# Patient Record
Sex: FEMALE | Race: WHITE | NOT HISPANIC OR LATINO | Employment: OTHER | URBAN - NONMETROPOLITAN AREA
[De-identification: names, ages, dates, MRNs, and addresses within clinical notes are randomized per-mention and may not be internally consistent; named-entity substitution may affect disease eponyms.]

---

## 2019-01-03 DIAGNOSIS — M25.561 RIGHT KNEE PAIN, UNSPECIFIED CHRONICITY: Primary | ICD-10-CM

## 2019-01-22 ENCOUNTER — OFFICE VISIT (OUTPATIENT)
Dept: ORTHOPEDIC SURGERY | Facility: CLINIC | Age: 64
End: 2019-01-22

## 2019-01-22 VITALS — BODY MASS INDEX: 28.56 KG/M2 | WEIGHT: 182 LBS | HEIGHT: 67 IN

## 2019-01-22 DIAGNOSIS — M25.561 ACUTE PAIN OF RIGHT KNEE: Primary | ICD-10-CM

## 2019-01-22 PROCEDURE — 99204 OFFICE O/P NEW MOD 45 MIN: CPT | Performed by: NURSE PRACTITIONER

## 2019-01-22 PROCEDURE — 20610 DRAIN/INJ JOINT/BURSA W/O US: CPT | Performed by: NURSE PRACTITIONER

## 2019-01-22 RX ORDER — PRAVASTATIN SODIUM 20 MG
TABLET ORAL
COMMUNITY
Start: 2019-01-07 | End: 2019-11-27 | Stop reason: SDUPTHER

## 2019-01-22 RX ORDER — LOSARTAN POTASSIUM 50 MG/1
100 TABLET ORAL
COMMUNITY
Start: 2019-01-07 | End: 2019-11-22 | Stop reason: ALTCHOICE

## 2019-01-22 RX ADMIN — TRIAMCINOLONE ACETONIDE 40 MG: 40 INJECTION, SUSPENSION INTRA-ARTICULAR; INTRAMUSCULAR at 08:36

## 2019-01-22 RX ADMIN — LIDOCAINE HYDROCHLORIDE 2 ML: 10 INJECTION, SOLUTION EPIDURAL; INFILTRATION; INTRACAUDAL; PERINEURAL at 08:36

## 2019-01-22 NOTE — PROGRESS NOTES
Ann Marie Malik is a 63 y.o. female   Primary provider:  Manuel Roberts APRN       Chief Complaint   Patient presents with   • Right Knee - Pain   • Establish Care       HISTORY OF PRESENT ILLNESS:     63-year-old female patient presents to office for evaluation of acute right knee pain.  Patient denies any known injury but states that the knee pain started suddenly after carrying groceries up some stairs.  She denies any fall.  Patient reports she had severe right knee pain with difficulty ambulating for about 2 days and then the pain began to improve somewhat.  Patient denies any history of chronic knee pain or prior injuries to the knee.  Patient reports her pain is intermittent now, but remains moderate in severity.  Pain is described as grinding and aching in nature with associated popping sensations and swelling.  Pain is worse with weightbearing, standing and walking.  Pain improves mildly with rest, ice therapy and anti-inflammatory medications.      Pain   This is a new problem. The current episode started 1 to 4 weeks ago (3 weeks ago). The problem occurs intermittently. The problem has been gradually improving. Associated symptoms include arthralgias and joint swelling. Associated symptoms comments: Grinding, aching pain; popping sensations with motion, swelling. The symptoms are aggravated by standing and walking (weightbearing activity). She has tried NSAIDs, ice and rest for the symptoms. The treatment provided mild relief.        CONCURRENT MEDICAL HISTORY:    History reviewed. No pertinent past medical history.    No Known Allergies      Current Outpatient Medications:   •  losartan (COZAAR) 50 MG tablet, , Disp: , Rfl:   •  pravastatin (PRAVACHOL) 20 MG tablet, , Disp: , Rfl:     Past Surgical History:   Procedure Laterality Date   • TUBAL ABDOMINAL LIGATION  1980       Family History   Problem Relation Age of Onset   • Heart disease Other    • Hypertension Other    • Cancer Other   "      Social History     Socioeconomic History   • Marital status:      Spouse name: Not on file   • Number of children: Not on file   • Years of education: Not on file   • Highest education level: Not on file   Social Needs   • Financial resource strain: Not on file   • Food insecurity - worry: Not on file   • Food insecurity - inability: Not on file   • Transportation needs - medical: Not on file   • Transportation needs - non-medical: Not on file   Occupational History   • Not on file   Tobacco Use   • Smoking status: Never Smoker   • Smokeless tobacco: Never Used   Substance and Sexual Activity   • Alcohol use: No     Frequency: Never   • Drug use: Not on file   • Sexual activity: Not on file   Other Topics Concern   • Not on file   Social History Narrative   • Not on file        Review of Systems   Constitutional: Positive for activity change.   HENT: Negative.    Eyes: Negative.         Dry eyes   Respiratory: Negative.    Cardiovascular: Negative.    Gastrointestinal: Negative.    Endocrine: Negative.    Genitourinary: Negative.    Musculoskeletal: Positive for arthralgias, gait problem and joint swelling.        Right knee pain.    Skin: Negative.    Allergic/Immunologic: Negative.    Hematological: Negative.    Psychiatric/Behavioral: Negative.        PHYSICAL EXAMINATION:       Ht 170.2 cm (67\")   Wt 82.6 kg (182 lb)   BMI 28.51 kg/m²     Physical Exam   Constitutional: She is oriented to person, place, and time. Vital signs are normal. She appears well-developed and well-nourished. She is active and cooperative. She does not appear ill. No distress.   HENT:   Head: Normocephalic.   Pulmonary/Chest: Effort normal. No respiratory distress.   Abdominal: Soft. She exhibits no distension.   Musculoskeletal: She exhibits edema (Mild, right knee) and tenderness (Mild, right knee). She exhibits no deformity.        Right knee: She exhibits no effusion.        Left knee: She exhibits no effusion. "   Neurological: She is alert and oriented to person, place, and time. GCS eye subscore is 4. GCS verbal subscore is 5. GCS motor subscore is 6.   Skin: Skin is warm, dry and intact. Capillary refill takes less than 2 seconds. No erythema.   Psychiatric: She has a normal mood and affect. Her speech is normal and behavior is normal. Judgment and thought content normal. Cognition and memory are normal.   Vitals reviewed.      GAIT:     []  Normal  [x]  Antalgic    Assistive device: [x]  None  []  Walker     []  Crutches  []  Cane     []  Wheelchair  []  Stretcher    Right Knee Exam     Tenderness   Right knee tenderness location: Mild, diffuse.    Range of Motion   Extension: 0   Flexion: 120     Tests   Nora:  Medial - negative Lateral - negative  Varus: negative Valgus: negative    Other   Erythema: absent  Sensation: normal  Pulse: present  Swelling: mild  Effusion: no effusion present    Comments:  Mild/minimal pain and limitations with range of motion. Mild, generalized swelling noted. No definitive effusion. No erythema. No warmth. Stable joint exam.       Left Knee Exam     Tenderness   The patient is experiencing no tenderness.     Range of Motion   Extension: 0   Flexion: 130     Tests   Nora:  Medial - negative Lateral - negative  Varus: negative Valgus: negative    Other   Erythema: absent  Sensation: normal  Pulse: present  Swelling: none  Effusion: no effusion present            Xr Knee 1 Or 2 View Right    Result Date: 1/22/2019  Narrative: Lateral x-ray of the right knee reveals no evidence of acute fracture or dislocation.  No significant degenerative changes are noted.  Patellofemoral joint spacing is well-maintained.  Anatomic alignment is normal.  Small joint effusion is noted.  No acute bony radiologic abnormalities are noted at this time.  No comparison images are available for review.01/22/19 at 9:39 AM by LUIS Barrera     Xr Knee Bilateral Ap Standing    Result Date:  1/22/2019  Narrative: AP standing x-ray of bilateral knees reveals no evidence of acute fracture or dislocation.  No significant degenerative changes are noted.  Medial and lateral compartmental joint spacing is well-maintained bilaterally.  Anatomic alignment of the knee joints is acceptable.  Soft tissues appear unremarkable.  No acute bony radiologic abnormalities are noted at this time.  No comparison images are available for review.01/22/19 at 9:38 AM by LUIS Barrera         ASSESSMENT:    Diagnoses and all orders for this visit:    Acute pain of right knee  -     Large Joint Arthrocentesis: R knee    Other orders  -     losartan (COZAAR) 50 MG tablet;   -     pravastatin (PRAVACHOL) 20 MG tablet;       Large Joint Arthrocentesis: R knee  Date/Time: 1/22/2019 8:36 AM  Consent given by: patient  Site marked: site marked  Timeout: Immediately prior to procedure a time out was called to verify the correct patient, procedure, equipment, support staff and site/side marked as required   Supporting Documentation  Indications: pain and joint swelling   Procedure Details  Location: knee - R knee  Preparation: Patient was prepped and draped in the usual sterile fashion  Needle size: 22 G  Approach: anterolateral  Medications administered: 2 mL lidocaine PF 1% 1 %; 40 mg triamcinolone acetonide 40 MG/ML  Patient tolerance: patient tolerated the procedure well with no immediate complications      PLAN    X-rays of right knee reviewed with no acute findings noted.  No significant degenerative findings are noted.  Patient had acute onset of right knee pain after carrying some groceries up a flight of stairs.  The pain has been gradually improving and is described as more intermittent now.  We discussed possible internal derangement, such as meniscal injury or ligament injury.  She has no definitive laxity and no locking/catching symptoms of concern currently.  We discussed possible MRI of her right knee.  Patient  wants to wait on MRI now and see if she improves with conservative treatment efforts.  Recommend intra-articular injection of steroid to the right knee today for management of pain/swelling/inflammation.  Recommend consistent dosing of oral NSAIDs, such as Ibuprofen or Aleve, for the next 2-4 weeks.  Recommend elevation of the right knee to minimize swelling.  Recommend ice therapy to the right knee intermittently 3-4 times daily for 20 minutes at a time to minimize pain/swelling.  Recommend rest and activity modification as tolerated and based on pain.  We also discussed trial of use of a cane for modified weight-bearing until her pain improves.  Patient can progress her activity and weightbearing as tolerated.  Follow-up in 2-4 weeks for recheck.  Plan for MRI of right knee pain/symptoms persist.     Return in about 2 weeks (around 2/5/2019) for Recheck.        This document has been electronically signed by LUIS Barrera on January 23, 2019 2:44 PM      LUIS Barrera

## 2019-01-23 PROBLEM — M25.561 ACUTE PAIN OF RIGHT KNEE: Status: ACTIVE | Noted: 2019-01-23

## 2019-01-23 RX ORDER — TRIAMCINOLONE ACETONIDE 40 MG/ML
40 INJECTION, SUSPENSION INTRA-ARTICULAR; INTRAMUSCULAR
Status: COMPLETED | OUTPATIENT
Start: 2019-01-22 | End: 2019-01-22

## 2019-01-23 RX ORDER — LIDOCAINE HYDROCHLORIDE 10 MG/ML
2 INJECTION, SOLUTION EPIDURAL; INFILTRATION; INTRACAUDAL; PERINEURAL
Status: COMPLETED | OUTPATIENT
Start: 2019-01-22 | End: 2019-01-22

## 2019-06-25 ENCOUNTER — OFFICE VISIT (OUTPATIENT)
Dept: ORTHOPEDIC SURGERY | Facility: CLINIC | Age: 64
End: 2019-06-25

## 2019-06-25 VITALS — BODY MASS INDEX: 28.88 KG/M2 | HEIGHT: 67 IN | WEIGHT: 184 LBS

## 2019-06-25 DIAGNOSIS — G89.29 CHRONIC PAIN OF RIGHT KNEE: Primary | ICD-10-CM

## 2019-06-25 DIAGNOSIS — M25.561 CHRONIC PAIN OF RIGHT KNEE: Primary | ICD-10-CM

## 2019-06-25 PROCEDURE — 20610 DRAIN/INJ JOINT/BURSA W/O US: CPT | Performed by: NURSE PRACTITIONER

## 2019-06-25 PROCEDURE — 99214 OFFICE O/P EST MOD 30 MIN: CPT | Performed by: NURSE PRACTITIONER

## 2019-06-25 RX ORDER — HYDROCHLOROTHIAZIDE 25 MG/1
TABLET ORAL
Refills: 5 | COMMUNITY
Start: 2019-06-04 | End: 2019-11-22 | Stop reason: SDUPTHER

## 2019-06-25 RX ADMIN — LIDOCAINE HYDROCHLORIDE 2 ML: 10 INJECTION, SOLUTION EPIDURAL; INFILTRATION; INTRACAUDAL; PERINEURAL at 09:39

## 2019-06-25 RX ADMIN — TRIAMCINOLONE ACETONIDE 40 MG: 40 INJECTION, SUSPENSION INTRA-ARTICULAR; INTRAMUSCULAR at 09:39

## 2019-06-25 NOTE — PROGRESS NOTES
"Ann Marie Malik is a 63 y.o. female returns for     Chief Complaint   Patient presents with   • Right Knee - Follow-up, Pain       HISTORY OF PRESENT ILLNESS: Patient presents to office for follow-up of right knee pain.  Patient had a sudden onset of acute right knee pain and January 2019 while carrying some groceries up some stairs.  Patient was given an injection of steroid into the right knee on 1/22/2019, which significantly improved her pain for several months.  Patient reports her pain has been returning recently.  She denies any new injury or incident associated with the recurrence of her right knee pain.     CONCURRENT MEDICAL HISTORY:    The following portions of the patient's history were reviewed and updated as appropriate: allergies, current medications, past family history, past medical history, past social history, past surgical history and problem list.     ROS  No fevers or chills.  No chest pain or shortness of air.  No GI or  disturbances. Right knee pain.     PHYSICAL EXAMINATION:       Ht 170.2 cm (67\")   Wt 83.5 kg (184 lb)   BMI 28.82 kg/m²     Physical Exam   Constitutional: She is oriented to person, place, and time. Vital signs are normal. She appears well-developed and well-nourished. She is active and cooperative. She does not appear ill. No distress.   HENT:   Head: Normocephalic.   Pulmonary/Chest: Effort normal. No respiratory distress.   Abdominal: Soft. She exhibits no distension.   Musculoskeletal: She exhibits edema (Mild, right knee) and tenderness (Mild, right knee). She exhibits no deformity.        Right knee: She exhibits no effusion.        Left knee: She exhibits no effusion.   Neurological: She is alert and oriented to person, place, and time. GCS eye subscore is 4. GCS verbal subscore is 5. GCS motor subscore is 6.   Skin: Skin is warm, dry and intact. Capillary refill takes less than 2 seconds. No erythema.   Psychiatric: She has a normal mood and affect. Her speech " is normal and behavior is normal. Judgment and thought content normal. Cognition and memory are normal.   Vitals reviewed.      GAIT:     []  Normal  [x]  Antalgic    Assistive device: [x]  None  []  Walker     []  Crutches  []  Cane     []  Wheelchair  []  Stretcher    Right Knee Exam     Tenderness   Right knee tenderness location: Mild, diffuse.    Range of Motion   Extension: 0   Flexion: 120     Tests   Nora:  Medial - negative Lateral - negative  Varus: negative Valgus: negative    Other   Erythema: absent  Sensation: normal  Pulse: present  Swelling: mild  Effusion: no effusion present    Comments:  Mild/minimal pain and limitations with range of motion. Mild, generalized swelling noted. No definitive effusion. No erythema. No warmth. Stable joint exam.       Left Knee Exam     Tenderness   The patient is experiencing no tenderness.     Range of Motion   Extension: 0   Flexion: 130     Tests   Nora:  Medial - negative Lateral - negative  Varus: negative Valgus: negative    Other   Erythema: absent  Sensation: normal  Pulse: present  Swelling: none  Effusion: no effusion present              Xr Knee 1 Or 2 View Right     Result Date: 1/22/2019  Narrative: Lateral x-ray of the right knee reveals no evidence of acute fracture or dislocation.  No significant degenerative changes are noted.  Patellofemoral joint spacing is well-maintained.  Anatomic alignment is normal.  Small joint effusion is noted.  No acute bony radiologic abnormalities are noted at this time.  No comparison images are available for review.01/22/19 at 9:39 AM by LUIS Barrera      Xr Knee Bilateral Ap Standing     Result Date: 1/22/2019  Narrative: AP standing x-ray of bilateral knees reveals no evidence of acute fracture or dislocation.  No significant degenerative changes are noted.  Medial and lateral compartmental joint spacing is well-maintained bilaterally.  Anatomic alignment of the knee joints is acceptable.  Soft tissues  appear unremarkable.  No acute bony radiologic abnormalities are noted at this time.  No comparison images are available for review.01/22/19 at 9:38 AM by LUIS Barrera        Large Joint Arthrocentesis: R knee  Date/Time: 6/25/2019 9:39 AM  Consent given by: patient  Site marked: site marked  Timeout: Immediately prior to procedure a time out was called to verify the correct patient, procedure, equipment, support staff and site/side marked as required   Supporting Documentation  Indications: pain and joint swelling   Procedure Details  Location: knee - R knee  Preparation: Patient was prepped and draped in the usual sterile fashion  Needle size: 22 G  Approach: anterolateral  Medications administered: 2 mL lidocaine PF 1% 1 %; 40 mg triamcinolone acetonide 40 MG/ML  Patient tolerance: patient tolerated the procedure well with no immediate complications            ASSESSMENT:    Diagnoses and all orders for this visit:    Chronic pain of right knee  -     Large Joint Arthrocentesis: R knee    PLAN    Patient complains of increasing right knee pain with no new injury or incident reported.  Patient had a similar episode in January 2019 when she had an acute onset of right knee pain while carrying some groceries up some stairs.  Patient was given an intra-articular injection of steroid at that time, which significantly improved her pain for several months.  On x-ray, she does not have any significant degenerative changes noted that are consistent with moderate or severe osteoarthritis.  We discussed other differential diagnoses, including meniscal tear, ligament injury and/or chondromalacia.  She is not having any mechanical symptoms in the knee.  She is not having instability symptoms.  We discussed ordering an MRI of the right knee for further evaluation.  Patient wants to wait on the MRI for now.  Recommend a repeat intra-articular injection of steroid to the right knee today for management of  pain/swelling/inflammation. Recommend consistent dosing of oral NSAIDs, such as Ibuprofen or Aleve, for the next 2-4 weeks.  Recommend elevation of the right knee to minimize swelling.  Recommend ice therapy to the right knee intermittently 3-4 times daily for 20 minutes at a time to minimize pain/swelling.  Recommend rest and activity modification as tolerated and based on pain.  We also discussed trial of use of a cane for modified weight-bearing until her pain improves.  Patient can progress her activity and weightbearing as tolerated.  Follow-up in 4 weeks for recheck.  Plan for MRI of right knee pain/symptoms persist.     Return in about 4 weeks (around 7/23/2019) for Recheck.        This document has been electronically signed by LUIS Barrera on June 27, 2019 8:18 AM      LUIS Barrera

## 2019-06-27 RX ORDER — TRIAMCINOLONE ACETONIDE 40 MG/ML
40 INJECTION, SUSPENSION INTRA-ARTICULAR; INTRAMUSCULAR
Status: COMPLETED | OUTPATIENT
Start: 2019-06-25 | End: 2019-06-25

## 2019-06-27 RX ORDER — LIDOCAINE HYDROCHLORIDE 10 MG/ML
2 INJECTION, SOLUTION EPIDURAL; INFILTRATION; INTRACAUDAL; PERINEURAL
Status: COMPLETED | OUTPATIENT
Start: 2019-06-25 | End: 2019-06-25

## 2019-11-22 ENCOUNTER — OFFICE VISIT (OUTPATIENT)
Dept: FAMILY MEDICINE CLINIC | Facility: CLINIC | Age: 64
End: 2019-11-22

## 2019-11-22 VITALS
SYSTOLIC BLOOD PRESSURE: 160 MMHG | HEIGHT: 67 IN | DIASTOLIC BLOOD PRESSURE: 120 MMHG | BODY MASS INDEX: 28.16 KG/M2 | WEIGHT: 179.4 LBS | HEART RATE: 77 BPM | OXYGEN SATURATION: 96 %

## 2019-11-22 DIAGNOSIS — Z12.11 COLON CANCER SCREENING: ICD-10-CM

## 2019-11-22 DIAGNOSIS — I16.0 ASYMPTOMATIC HYPERTENSIVE URGENCY: Primary | ICD-10-CM

## 2019-11-22 DIAGNOSIS — Z11.59 NEED FOR HEPATITIS C SCREENING TEST: ICD-10-CM

## 2019-11-22 DIAGNOSIS — E78.5 HYPERLIPIDEMIA, UNSPECIFIED HYPERLIPIDEMIA TYPE: ICD-10-CM

## 2019-11-22 DIAGNOSIS — Z76.89 ENCOUNTER TO ESTABLISH CARE: ICD-10-CM

## 2019-11-22 DIAGNOSIS — K21.9 GASTROESOPHAGEAL REFLUX DISEASE, ESOPHAGITIS PRESENCE NOT SPECIFIED: ICD-10-CM

## 2019-11-22 PROCEDURE — 99203 OFFICE O/P NEW LOW 30 MIN: CPT | Performed by: FAMILY MEDICINE

## 2019-11-22 RX ORDER — OMEPRAZOLE 20 MG/1
CAPSULE, DELAYED RELEASE ORAL
Refills: 5 | COMMUNITY
Start: 2019-10-23 | End: 2019-11-22 | Stop reason: SDUPTHER

## 2019-11-22 RX ORDER — ACETAMINOPHEN 500 MG
500 TABLET ORAL EVERY 6 HOURS PRN
COMMUNITY
End: 2021-08-31

## 2019-11-22 RX ORDER — LISINOPRIL 20 MG/1
20 TABLET ORAL DAILY
Qty: 90 TABLET | Refills: 0 | Status: SHIPPED | OUTPATIENT
Start: 2019-11-22 | End: 2020-01-22

## 2019-11-22 RX ORDER — OMEPRAZOLE 20 MG/1
20 CAPSULE, DELAYED RELEASE ORAL DAILY
Qty: 90 CAPSULE | Refills: 0 | Status: SHIPPED | OUTPATIENT
Start: 2019-11-22 | End: 2020-01-22 | Stop reason: SDUPTHER

## 2019-11-22 RX ORDER — HYDROCHLOROTHIAZIDE 25 MG/1
25 TABLET ORAL DAILY
Qty: 90 TABLET | Refills: 0 | Status: SHIPPED | OUTPATIENT
Start: 2019-11-22 | End: 2020-01-22 | Stop reason: SDUPTHER

## 2019-11-22 NOTE — PROGRESS NOTES
Subjective   Chief Complaint   Patient presents with   • Establish Care   • Med Refill     Ann Marie Malik is a 64 y.o. year old presenting to establish care as new patient.      Additional Concerns:    1.  Hypertension - Patient presents with concerns for hypertension.  States that blood pressure has been running pretty high.  She has been checking at home.  Systolic blood pressure has been between 130 and 160.  Diastolic blood pressure has been between 80 and 120.  Patient intermittently experiences headaches with high blood pressures.  She is asymptomatic today.  No headache, no blurry vision, no chest pain no shortness of breath. Current antihypertensive medications include hydrochlorothiazide 25 mg and losartan 100 mg.  Patient has been taking these as prescribed.  She has taken her 's lisinopril in the past when her BP was very high, and this seemed to work well for her.    2.  Hyperlipidemia - Controlled.  History of high cholesterol.  Currently taking pravastatin 20 mg daily.    3.  GERD - Controlled with omeprazole 20 mg daily.    Past Medical History:   Diagnosis Date   • GERD (gastroesophageal reflux disease)    • Hyperlipidemia    • Hypertension        Past Surgical History:   Procedure Laterality Date   • TUBAL ABDOMINAL LIGATION  1980       Medications:  Current Outpatient Medications on File Prior to Visit   Medication Sig Dispense Refill   • hydrochlorothiazide (HYDRODIURIL) 25 MG tablet TAKE 1 TABLET BY MOUTH ONCE DAILY WITH LOSARTAN  5   • losartan (COZAAR) 50 MG tablet 100 mg.     • pravastatin (PRAVACHOL) 20 MG tablet        No current facility-administered medications on file prior to visit.        No Known Allergies    Family History   Problem Relation Age of Onset   • Heart disease Other    • Hypertension Other    • Cancer Other        Social History     Socioeconomic History   • Marital status:      Spouse name: Not on file   • Number of children: Not on file   • Years of  "education: Not on file   • Highest education level: Not on file   Tobacco Use   • Smoking status: Never Smoker   • Smokeless tobacco: Never Used   Substance and Sexual Activity   • Alcohol use: No     Frequency: Never       Health Maintenance   Topic Date Due   • TDAP/TD VACCINES (1 - Tdap) 11/03/1973   • ZOSTER VACCINE (1 of 2) 11/03/2004   • COLONOSCOPY  01/03/2019   • LIPID PANEL  11/22/2019   • PNEUMOCOCCAL VACCINES (65+ LOW/MEDIUM RISK) (2 of 2 - PCV13) 11/05/2020   • MAMMOGRAM  08/06/2021   • INFLUENZA VACCINE  Completed     Last DEXA: 3/21/16, normal      Problem list was reviewed and updated as appropriate.      Review of Systems   Constitutional: Negative for appetite change and unexpected weight change.   HENT: Negative for voice change.    Eyes: Negative for visual disturbance.   Respiratory: Negative for chest tightness and shortness of breath.    Cardiovascular: Negative for chest pain and leg swelling.   Gastrointestinal: Negative for abdominal pain, constipation and diarrhea.   Endocrine: Negative for cold intolerance and heat intolerance.   Genitourinary: Negative for difficulty urinating.   Musculoskeletal: Negative for back pain and myalgias.   Skin: Negative for rash.   Neurological: Negative for numbness and headaches.   Psychiatric/Behavioral: Negative for dysphoric mood and sleep disturbance.         Objective     BP (!) 160/120   Pulse 77   Ht 170.2 cm (67\")   Wt 81.4 kg (179 lb 6.4 oz)   SpO2 96%   BMI 28.10 kg/m²   Physical Exam   Constitutional: She is oriented to person, place, and time. She appears well-developed and well-nourished. No distress.   HENT:   Head: Normocephalic and atraumatic.   Nose: Nose normal.   Mouth/Throat: Oropharynx is clear and moist.   Eyes: Conjunctivae and EOM are normal. Pupils are equal, round, and reactive to light.   Neck: Normal range of motion. Neck supple. No thyromegaly present.   Cardiovascular: Normal rate, regular rhythm and normal heart sounds. "   No murmur heard.  Pulmonary/Chest: Effort normal and breath sounds normal. No respiratory distress. She has no wheezes. She has no rales.   Abdominal: Soft. Bowel sounds are normal. She exhibits no distension. There is no tenderness.   Musculoskeletal: Normal range of motion. She exhibits no edema or tenderness.   Lymphadenopathy:     She has no cervical adenopathy.   Neurological: She is alert and oriented to person, place, and time.   Skin: Skin is warm and dry. No rash noted.   Psychiatric: She has a normal mood and affect.   Vitals reviewed.      Lab Review   CBC and CMP from 11/30/2017.  Done at outside facility.    Imaging  Mammogram report 8/6/2019.  Done at outside facility.         Assessment/Plan   Ann Marie was seen today for establish care and med refill.    Diagnoses and all orders for this visit:    Asymptomatic hypertensive urgency  Elevated blood pressure in the office today.  Patient is asymptomatic.  Per her report, blood pressure has been running high for the last several weeks.  Will adjust antihypertensive therapy today.  Patient should discontinue losartan and switch to lisinopril 20 mg daily.  She will continue hydrochlorthiazide 25 mg.  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  Encouraged daily checks of her blood pressure.  If remains high, patient advised to call our office on Monday so that medication adjustments could be made.  Will plan to increase lisinopril to 40 mg daily if needed at that time.  After that, adding a third antihypertensive agent will be considered.  Recommended that patient go to the urgent care/ED should she develop any symptoms with this blood pressure over the weekend.  Will order CMP and CBC today.  -     hydroCHLOROthiazide (HYDRODIURIL) 25 MG tablet; Take 1 tablet by mouth Daily.  -     lisinopril (PRINIVIL,ZESTRIL) 20 MG tablet; Take 1 tablet by mouth Daily.  -     Comprehensive Metabolic Panel  -     CBC & Differential;  Future    Gastroesophageal reflux disease, esophagitis presence not specified  Controlled.  Refill of Prilosec provided today.  -     omeprazole (priLOSEC) 20 MG capsule; Take 1 capsule by mouth Daily.    Encounter to establish care  Health maintenance items reviewed.  Counseled on Shingrix vaccine, patient will contact her pharmacy to have this completed.  Hepatitis C screening will be ordered with labs to be done in the next couple of weeks.  Discussed options for colorectal screening, with risk and benefits of each.  Patient would like to be screened with Cologuard.    Hyperlipidemia, unspecified hyperlipidemia type  Labs ordered.  Patient will return when she is fasting to have this done.  -     Lipid Panel; Future    Need for hepatitis C screening test  -     Hepatitis C antibody; Future         Patient will call the office next week with blood pressure readings.    Welcome to Medicare visit will be scheduled in 2 months.          This document has been electronically signed by Verito He MD on November 22, 2019 3:17 PM

## 2019-11-24 ENCOUNTER — RESULTS ENCOUNTER (OUTPATIENT)
Dept: FAMILY MEDICINE CLINIC | Facility: CLINIC | Age: 64
End: 2019-11-24

## 2019-11-24 DIAGNOSIS — Z12.11 COLON CANCER SCREENING: ICD-10-CM

## 2019-11-25 ENCOUNTER — TELEPHONE (OUTPATIENT)
Dept: FAMILY MEDICINE CLINIC | Facility: CLINIC | Age: 64
End: 2019-11-25

## 2019-11-27 RX ORDER — PRAVASTATIN SODIUM 20 MG
20 TABLET ORAL NIGHTLY
Qty: 90 TABLET | Refills: 1 | Status: SHIPPED | OUTPATIENT
Start: 2019-11-27 | End: 2019-12-05

## 2019-12-03 ENCOUNTER — LAB (OUTPATIENT)
Dept: LAB | Facility: HOSPITAL | Age: 64
End: 2019-12-03

## 2019-12-03 DIAGNOSIS — E78.5 HYPERLIPIDEMIA, UNSPECIFIED HYPERLIPIDEMIA TYPE: ICD-10-CM

## 2019-12-03 DIAGNOSIS — I16.0 ASYMPTOMATIC HYPERTENSIVE URGENCY: ICD-10-CM

## 2019-12-03 DIAGNOSIS — Z11.59 NEED FOR HEPATITIS C SCREENING TEST: ICD-10-CM

## 2019-12-03 LAB
ALBUMIN SERPL-MCNC: 4.5 G/DL (ref 3.5–5.2)
ALBUMIN/GLOB SERPL: 1.6 G/DL
ALP SERPL-CCNC: 149 U/L (ref 39–117)
ALT SERPL W P-5'-P-CCNC: 23 U/L (ref 1–33)
ANION GAP SERPL CALCULATED.3IONS-SCNC: 17 MMOL/L (ref 5–15)
AST SERPL-CCNC: 25 U/L (ref 1–32)
BASOPHILS # BLD AUTO: 0.07 10*3/MM3 (ref 0–0.2)
BASOPHILS NFR BLD AUTO: 1.3 % (ref 0–1.5)
BILIRUB SERPL-MCNC: 0.5 MG/DL (ref 0.2–1.2)
BUN BLD-MCNC: 12 MG/DL (ref 8–23)
BUN/CREAT SERPL: 17.6 (ref 7–25)
CALCIUM SPEC-SCNC: 9.7 MG/DL (ref 8.6–10.5)
CHLORIDE SERPL-SCNC: 102 MMOL/L (ref 98–107)
CHOLEST SERPL-MCNC: 215 MG/DL (ref 0–200)
CO2 SERPL-SCNC: 25 MMOL/L (ref 22–29)
CREAT BLD-MCNC: 0.68 MG/DL (ref 0.57–1)
DEPRECATED RDW RBC AUTO: 43.3 FL (ref 37–54)
EOSINOPHIL # BLD AUTO: 0.17 10*3/MM3 (ref 0–0.4)
EOSINOPHIL NFR BLD AUTO: 3.3 % (ref 0.3–6.2)
ERYTHROCYTE [DISTWIDTH] IN BLOOD BY AUTOMATED COUNT: 12.7 % (ref 12.3–15.4)
GFR SERPL CREATININE-BSD FRML MDRD: 87 ML/MIN/1.73
GLOBULIN UR ELPH-MCNC: 2.8 GM/DL
GLUCOSE BLD-MCNC: 91 MG/DL (ref 65–99)
HCT VFR BLD AUTO: 40.2 % (ref 34–46.6)
HCV AB SER DONR QL: NORMAL
HDLC SERPL-MCNC: 63 MG/DL (ref 40–60)
HGB BLD-MCNC: 14 G/DL (ref 12–15.9)
IMM GRANULOCYTES # BLD AUTO: 0.03 10*3/MM3 (ref 0–0.05)
IMM GRANULOCYTES NFR BLD AUTO: 0.6 % (ref 0–0.5)
LDLC SERPL CALC-MCNC: 110 MG/DL (ref 0–100)
LDLC/HDLC SERPL: 1.75 {RATIO}
LYMPHOCYTES # BLD AUTO: 1.6 10*3/MM3 (ref 0.7–3.1)
LYMPHOCYTES NFR BLD AUTO: 30.8 % (ref 19.6–45.3)
MCH RBC QN AUTO: 32.5 PG (ref 26.6–33)
MCHC RBC AUTO-ENTMCNC: 34.8 G/DL (ref 31.5–35.7)
MCV RBC AUTO: 93.3 FL (ref 79–97)
MONOCYTES # BLD AUTO: 0.31 10*3/MM3 (ref 0.1–0.9)
MONOCYTES NFR BLD AUTO: 6 % (ref 5–12)
NEUTROPHILS # BLD AUTO: 3.01 10*3/MM3 (ref 1.7–7)
NEUTROPHILS NFR BLD AUTO: 58 % (ref 42.7–76)
NRBC BLD AUTO-RTO: 0 /100 WBC (ref 0–0.2)
PLATELET # BLD AUTO: 310 10*3/MM3 (ref 140–450)
PMV BLD AUTO: 10.3 FL (ref 6–12)
POTASSIUM BLD-SCNC: 4 MMOL/L (ref 3.5–5.2)
PROT SERPL-MCNC: 7.3 G/DL (ref 6–8.5)
RBC # BLD AUTO: 4.31 10*6/MM3 (ref 3.77–5.28)
SODIUM BLD-SCNC: 144 MMOL/L (ref 136–145)
TRIGL SERPL-MCNC: 210 MG/DL (ref 0–150)
VLDLC SERPL-MCNC: 42 MG/DL (ref 5–40)
WBC NRBC COR # BLD: 5.19 10*3/MM3 (ref 3.4–10.8)

## 2019-12-03 PROCEDURE — 80061 LIPID PANEL: CPT

## 2019-12-03 PROCEDURE — 36415 COLL VENOUS BLD VENIPUNCTURE: CPT | Performed by: FAMILY MEDICINE

## 2019-12-03 PROCEDURE — 85025 COMPLETE CBC W/AUTO DIFF WBC: CPT

## 2019-12-03 PROCEDURE — 80053 COMPREHEN METABOLIC PANEL: CPT | Performed by: FAMILY MEDICINE

## 2019-12-03 PROCEDURE — 86803 HEPATITIS C AB TEST: CPT

## 2019-12-05 ENCOUNTER — TELEPHONE (OUTPATIENT)
Dept: FAMILY MEDICINE CLINIC | Facility: CLINIC | Age: 64
End: 2019-12-05

## 2019-12-05 DIAGNOSIS — E78.2 MIXED HYPERLIPIDEMIA: ICD-10-CM

## 2019-12-05 DIAGNOSIS — Z91.89 10 YEAR RISK OF MI OR STROKE 7.5% OR GREATER: Primary | ICD-10-CM

## 2019-12-05 RX ORDER — ATORVASTATIN CALCIUM 20 MG/1
TABLET, FILM COATED ORAL
Qty: 46 TABLET | Refills: 0 | Status: SHIPPED | OUTPATIENT
Start: 2019-12-05 | End: 2019-12-30

## 2019-12-05 NOTE — TELEPHONE ENCOUNTER
Called and spoke with patient regarding labs.  Lipid panel used to calculate 10 year ASCVD risk, which is 11.2%.  Patient currently taking pravastatin 20 mg at bedtime.  Advised going to high intensity statin to lower this risk..  Patient agreeable.  Will send in Lipitor 20 mg tablets.  Should take 20 mg for the first 2 weeks, then if tolerated increase to 40 mg daily.  She voiced understanding. Patient should discontinue pravastatin when she picks up new prescription.  Tonja He

## 2019-12-30 ENCOUNTER — TELEPHONE (OUTPATIENT)
Dept: FAMILY MEDICINE CLINIC | Facility: CLINIC | Age: 64
End: 2019-12-30

## 2019-12-30 RX ORDER — ATORVASTATIN CALCIUM 40 MG/1
40 TABLET, FILM COATED ORAL DAILY
Qty: 90 TABLET | Refills: 1 | Status: SHIPPED | OUTPATIENT
Start: 2019-12-30 | End: 2020-06-25 | Stop reason: SDUPTHER

## 2020-01-14 ENCOUNTER — TELEPHONE (OUTPATIENT)
Dept: FAMILY MEDICINE CLINIC | Facility: CLINIC | Age: 65
End: 2020-01-14

## 2020-01-22 ENCOUNTER — OFFICE VISIT (OUTPATIENT)
Dept: FAMILY MEDICINE CLINIC | Facility: CLINIC | Age: 65
End: 2020-01-22

## 2020-01-22 VITALS
HEIGHT: 67 IN | SYSTOLIC BLOOD PRESSURE: 128 MMHG | WEIGHT: 180.6 LBS | OXYGEN SATURATION: 98 % | HEART RATE: 72 BPM | BODY MASS INDEX: 28.35 KG/M2 | DIASTOLIC BLOOD PRESSURE: 80 MMHG

## 2020-01-22 DIAGNOSIS — Z00.00 WELCOME TO MEDICARE PREVENTIVE VISIT: Primary | ICD-10-CM

## 2020-01-22 DIAGNOSIS — I10 ESSENTIAL HYPERTENSION: ICD-10-CM

## 2020-01-22 DIAGNOSIS — E78.2 MIXED HYPERLIPIDEMIA: ICD-10-CM

## 2020-01-22 DIAGNOSIS — K21.9 GASTROESOPHAGEAL REFLUX DISEASE, ESOPHAGITIS PRESENCE NOT SPECIFIED: ICD-10-CM

## 2020-01-22 PROCEDURE — G0402 INITIAL PREVENTIVE EXAM: HCPCS | Performed by: FAMILY MEDICINE

## 2020-01-22 RX ORDER — VALSARTAN 80 MG/1
80 TABLET ORAL DAILY
Qty: 90 TABLET | Refills: 0 | Status: SHIPPED | OUTPATIENT
Start: 2020-01-22 | End: 2020-04-10 | Stop reason: SDUPTHER

## 2020-01-22 RX ORDER — OMEPRAZOLE 20 MG/1
20 CAPSULE, DELAYED RELEASE ORAL DAILY
Qty: 90 CAPSULE | Refills: 1 | Status: SHIPPED | OUTPATIENT
Start: 2020-01-22 | End: 2020-08-11 | Stop reason: SDUPTHER

## 2020-01-22 RX ORDER — HYDROCHLOROTHIAZIDE 25 MG/1
25 TABLET ORAL DAILY
Qty: 90 TABLET | Refills: 1 | Status: SHIPPED | OUTPATIENT
Start: 2020-01-22 | End: 2020-08-11 | Stop reason: SDUPTHER

## 2020-01-22 NOTE — PROGRESS NOTES
The ABCs of the Annual Wellness Visit  Welcome to Medicare Visit    Chief Complaint   Patient presents with   • Welcome To Medicare   • Cough     due to med       Subjective   History of Present Illness:  Ann Marie Malik is a 64 y.o. female who presents for a  Welcome to Medicare Visit.    HEALTH RISK ASSESSMENT    Recent Hospitalizations:  No hospitalization(s) within the last year.    Current Medical Providers:  Patient Care Team:  Verito He MD as PCP - General (Family Medicine)  Yelitza Arteaga APRN as Nurse Practitioner (Orthopedic Surgery)    Smoking Status:  Social History     Tobacco Use   Smoking Status Former Smoker   • Packs/day: 1.00   • Years: 25.00   • Pack years: 25.00   • Last attempt to quit:    • Years since quittin.0   Smokeless Tobacco Never Used       Alcohol Consumption:  Social History     Substance and Sexual Activity   Alcohol Use No   • Frequency: Never       Depression Screen:   PHQ-2/PHQ-9 Depression Screening 2020   Little interest or pleasure in doing things 0   Feeling down, depressed, or hopeless 0   Trouble falling or staying asleep, or sleeping too much 0   Feeling tired or having little energy 0   Poor appetite or overeating 0   Feeling bad about yourself - or that you are a failure or have let yourself or your family down 0   Trouble concentrating on things, such as reading the newspaper or watching television 0   Moving or speaking so slowly that other people could have noticed. Or the opposite - being so fidgety or restless that you have been moving around a lot more than usual 0   Thoughts that you would be better off dead, or of hurting yourself in some way 0   Total Score 0       Fall Risk Screen:  STEADI Fall Risk Assessment was completed, and patient is at LOW risk for falls.Assessment completed on:2020    Health Habits and Functional and Cognitive Screening:  Functional & Cognitive Status 2020   Do you have difficulty preparing food and  eating? No   Do you have difficulty bathing yourself, getting dressed or grooming yourself? No   Do you have difficulty using the toilet? No   Do you have difficulty moving around from place to place? No   Do you have trouble with steps or getting out of a bed or a chair? No   Current Diet Well Balanced Diet   Dental Exam Up to date   Eye Exam Up to date   Exercise (times per week) 3 times per week   Do you need help using the phone?  No   Are you deaf or do you have serious difficulty hearing?  No   Do you need help with transportation? No   Do you need help shopping? No   Do you need help preparing meals?  No   Do you need help with housework?  No   Do you need help with laundry? No   Do you need help taking your medications? No   Do you need help managing money? No   Do you ever drive or ride in a car without wearing a seat belt? No   Have you felt unusual stress, anger or loneliness in the last month? No   Who do you live with? Spouse   If you need help, do you have trouble finding someone available to you? No   Have you been bothered in the last four weeks by sexual problems? No   Do you have difficulty concentrating, remembering or making decisions? No     Does the patient have evidence of cognitive impairment? No  Asprin use counseling:Does not need ASA (and currently is not on it)    Visual Acuity:  Done March 2019    Age-appropriate Screening Schedule:  Refer to the list below for future screening recommendations based on patient's age, sex and/or medical conditions. Orders for these recommended tests are listed in the plan section. The patient has been provided with a written plan.    Health Maintenance   Topic Date Due   • TDAP/TD VACCINES (1 - Tdap) 11/03/1965   • ZOSTER VACCINE (1 of 2) 11/03/2004   • COLONOSCOPY  01/03/2019   • LIPID PANEL  12/03/2020   • MAMMOGRAM  08/06/2021   • INFLUENZA VACCINE  Completed          The following portions of the patient's history were reviewed and updated as  appropriate: allergies, current medications, past family history, past medical history, past social history, past surgical history and problem list.    Outpatient Medications Prior to Visit   Medication Sig Dispense Refill   • acetaminophen (TYLENOL) 500 MG tablet Take 500 mg by mouth Every 6 (Six) Hours As Needed for Mild Pain .     • atorvastatin (LIPITOR) 40 MG tablet Take 1 tablet by mouth Daily for 180 days. 90 tablet 1   • hydroCHLOROthiazide (HYDRODIURIL) 25 MG tablet Take 1 tablet by mouth Daily. 90 tablet 0   • lisinopril (PRINIVIL,ZESTRIL) 20 MG tablet Take 1 tablet by mouth Daily. 90 tablet 0   • Multiple Vitamins-Minerals (CENTRUM SILVER 50+WOMEN PO) Take  by mouth.     • omeprazole (priLOSEC) 20 MG capsule Take 1 capsule by mouth Daily. 90 capsule 0     No facility-administered medications prior to visit.        Patient Active Problem List   Diagnosis   • Acute pain of right knee       Advanced Care Planning:  Patient does not have an advance directive - information provided to the patient today    Review of Systems   Constitutional: Negative for chills and fever.   HENT: Negative for congestion and sinus pain.    Respiratory: Positive for cough. Negative for shortness of breath.    Cardiovascular: Negative for chest pain and leg swelling.   Gastrointestinal: Negative for abdominal pain, diarrhea, nausea and vomiting.   Musculoskeletal: Negative for back pain, gait problem and joint swelling.   Skin: Negative for rash.   Neurological: Negative for weakness and headaches.   Psychiatric/Behavioral: Negative for dysphoric mood and sleep disturbance.       Compared to one year ago, the patient feels her physical health is the same.  Compared to one year ago, the patient feels her mental health is the same.    Reviewed chart for potential of high risk medication in the elderly: yes  Reviewed chart for potential of harmful drug interactions in the elderly:yes    Objective         Vitals:    01/22/20 1041  "  BP: 128/80   BP Location: Left arm   Pulse: 72   SpO2: 98%   Weight: 81.9 kg (180 lb 9.6 oz)   Height: 170.2 cm (67\")   PainSc: 0-No pain       Discussed the patient's BMI with her. The BMI is above average; BMI management plan is completed.  Patient's Body mass index is 28.29 kg/m².  Recommendations include: exercise counseling and nutrition counseling.    Physical Exam   Constitutional: She is oriented to person, place, and time. She appears well-developed and well-nourished. No distress.   HENT:   Head: Normocephalic and atraumatic.   Mouth/Throat: Oropharynx is clear and moist.   Eyes: EOM are normal.   Neck: Neck supple.   Cardiovascular: Normal rate, regular rhythm and normal heart sounds.   No murmur heard.  Pulmonary/Chest: Effort normal and breath sounds normal. No respiratory distress. She has no wheezes. She has no rales.   Abdominal: Soft. Bowel sounds are normal. There is no tenderness.   Neurological: She is alert and oriented to person, place, and time.   Skin: Skin is warm and dry. No rash noted.   Psychiatric: She has a normal mood and affect.   Vitals reviewed.      Lab Results   Component Value Date    TRIG 210 (H) 12/03/2019    HDL 63 (H) 12/03/2019     (H) 12/03/2019    VLDL 42 (H) 12/03/2019        Assessment/Plan   Medicare Risks and Personalized Health Plan  CMS Preventative Services Quick Reference  Advance Directive Discussion  Breast Cancer/Mammogram Screening  Cardiovascular risk  Colon Cancer Screening  Immunizations Discussed/Encouraged (specific immunizations; Shingrix )  Obesity/Overweight     The above risks/problems have been discussed with the patient.  Pertinent information has been shared with the patient in the After Visit Summary.  Follow up plans and orders are seen below in the Assessment/Plan Section.    Diagnoses and all orders for this visit:    1. Welcome to Medicare preventive visit (Primary)    2. Gastroesophageal reflux disease, esophagitis presence not " specified  Medication refill provided.  -     omeprazole (priLOSEC) 20 MG capsule; Take 1 capsule by mouth Daily.  Dispense: 90 capsule; Refill: 1    3. Essential hypertension  Lisinopril causing cough.  Will change to valsartan 80 mg daily, starting tomorrow.  Patient should check BP regularly over the next week, and call with status of BP in about a week.  If persistently high, can call sooner.  -     hydroCHLOROthiazide (HYDRODIURIL) 25 MG tablet; Take 1 tablet by mouth Daily.  Dispense: 90 tablet; Refill: 1  -     valsartan (DIOVAN) 80 MG tablet; Take 1 tablet by mouth Daily for 90 days.  Dispense: 90 tablet; Refill: 0    4. Mixed hyperlipidemia  Tolerating Lipitor 40 mg well.        Follow Up:  Return in about 6 months (around 7/22/2020) for Recheck HTN.     An After Visit Summary and PPPS were given to the patient.             This document has been electronically signed by Verito He MD on January 22, 2020 12:54 PM

## 2020-01-22 NOTE — PATIENT INSTRUCTIONS
Medicare Wellness  Personal Prevention Plan of Service     Date of Office Visit:  2020  Encounter Provider:  Verito He MD  Place of Service:  [facility]  Patient Name: Ann Marie Malik  :  1955    As part of the Medicare Wellness portion of your visit today, we are providing you with this personalized preventive plan of services (PPPS). This plan is based upon recommendations of the [place] Preventive Services Task Force (USPSTF) and the Advisory Committee on Immunization Practices (ACIP).    This lists the preventive care services that should be considered, and provides dates of when you are due. Items listed as completed are up-to-date and do not require any further intervention.    Health Maintenance   Topic Date Due   • TDAP/TD VACCINES (1 - Tdap) 1965   • ZOSTER VACCINE (1 of 2) 2004   • MEDICARE ANNUAL WELLNESS  2019   • COLONOSCOPY  2019   • LIPID PANEL  2020   • MAMMOGRAM  2021   • HEPATITIS C SCREENING  Completed   • Pneumococcal Vaccine Once at 65 Years Old  Completed   • INFLUENZA VACCINE  Completed       No orders of the defined types were placed in this encounter.      Return in about 6 months (around 2020) for Recheck HTN.

## 2020-01-29 DIAGNOSIS — M25.561 CHRONIC PAIN OF RIGHT KNEE: Primary | ICD-10-CM

## 2020-01-29 DIAGNOSIS — G89.29 CHRONIC PAIN OF RIGHT KNEE: Primary | ICD-10-CM

## 2020-02-04 ENCOUNTER — TELEPHONE (OUTPATIENT)
Dept: FAMILY MEDICINE CLINIC | Facility: CLINIC | Age: 65
End: 2020-02-04

## 2020-02-04 NOTE — TELEPHONE ENCOUNTER
Please call and let patient know that Cologuard was negative.  Will need repeat screening for colon cancer in 3 years.  Health maintenance updated. Please let me know if any questions/concerns.  ThanksKEVAN

## 2020-02-04 NOTE — TELEPHONE ENCOUNTER
Per Dr. He, Ms. Malik has been called with her Cologuard Colorectal Screening results and recommendations.   Continue her current medications and follow-up as planned or sooner if any problems.     Dr. He,  Ms. Malik said to let you know that her Blood Pressures have been running in the 120/80 area, so the Valsartan seems to be working.

## 2020-03-05 ENCOUNTER — OFFICE VISIT (OUTPATIENT)
Dept: ORTHOPEDIC SURGERY | Facility: CLINIC | Age: 65
End: 2020-03-05

## 2020-03-05 VITALS — WEIGHT: 183.3 LBS | BODY MASS INDEX: 28.77 KG/M2 | HEIGHT: 67 IN

## 2020-03-05 DIAGNOSIS — M23.91 INTERNAL DERANGEMENT OF RIGHT KNEE: ICD-10-CM

## 2020-03-05 DIAGNOSIS — G89.29 CHRONIC PAIN OF RIGHT KNEE: Primary | ICD-10-CM

## 2020-03-05 DIAGNOSIS — M17.11 PRIMARY OSTEOARTHRITIS OF RIGHT KNEE: ICD-10-CM

## 2020-03-05 DIAGNOSIS — M25.561 CHRONIC PAIN OF RIGHT KNEE: Primary | ICD-10-CM

## 2020-03-05 PROCEDURE — 20610 DRAIN/INJ JOINT/BURSA W/O US: CPT | Performed by: NURSE PRACTITIONER

## 2020-03-05 PROCEDURE — 99214 OFFICE O/P EST MOD 30 MIN: CPT | Performed by: NURSE PRACTITIONER

## 2020-03-05 RX ADMIN — LIDOCAINE HYDROCHLORIDE 2 ML: 10 INJECTION, SOLUTION EPIDURAL; INFILTRATION; INTRACAUDAL; PERINEURAL at 16:34

## 2020-03-05 RX ADMIN — TRIAMCINOLONE ACETONIDE 40 MG: 40 INJECTION, SUSPENSION INTRA-ARTICULAR; INTRAMUSCULAR at 16:34

## 2020-03-05 NOTE — PROGRESS NOTES
"Ann Marie Malik is a 64 y.o. female returns for     Chief Complaint   Patient presents with   • Right Knee - Follow-up       HISTORY OF PRESENT ILLNESS: Patient presents to office for follow-up of chronic right knee pain.  Onset of pain occurred in January 2019 while carrying some groceries up some stairs.  Patient reports her right knee pain has continued to progressively worsen.  She has increased pain with weightbearing activity.  Patient has had prior injections in the right knee, which have offered her good pain relief for a few months at a time.  Patient denies any known injury. Patient wants to proceed with MRI for further evaluation of her right knee pain.  X-rays are repeated today in office.      CONCURRENT MEDICAL HISTORY:    The following portions of the patient's history were reviewed and updated as appropriate: allergies, current medications, past family history, past medical history, past social history, past surgical history and problem list.     ROS  No fevers or chills.  No chest pain or shortness of air.  No GI or  disturbances. Right knee pain.     PHYSICAL EXAMINATION:       Ht 170.2 cm (67\")   Wt 83.1 kg (183 lb 4.8 oz)   BMI 28.71 kg/m²     Physical Exam   Constitutional: She is oriented to person, place, and time. Vital signs are normal. She appears well-developed and well-nourished. She is active and cooperative. She does not appear ill. No distress.   HENT:   Head: Normocephalic.   Pulmonary/Chest: Effort normal. No respiratory distress.   Abdominal: Soft. She exhibits no distension.   Musculoskeletal: She exhibits edema (Mild, right knee) and tenderness (Right knee). She exhibits no deformity.        Right knee: She exhibits effusion (Mild).        Left knee: She exhibits no effusion.   Neurological: She is alert and oriented to person, place, and time. GCS eye subscore is 4. GCS verbal subscore is 5. GCS motor subscore is 6.   Skin: Skin is warm, dry and intact. Capillary refill " takes less than 2 seconds. No erythema.   Psychiatric: She has a normal mood and affect. Her speech is normal and behavior is normal. Judgment and thought content normal. Cognition and memory are normal.   Vitals reviewed.      GAIT:     []  Normal  [x]  Antalgic    Assistive device: [x]  None  []  Walker     []  Crutches  []  Cane     []  Wheelchair  []  Stretcher    Right Knee Exam     Tenderness   The patient is experiencing tenderness in the lateral joint line (Diffuse).    Range of Motion   Extension: 0   Flexion: 120     Tests   Nora:  Medial - negative Lateral - positive  Varus: negative Valgus: negative    Other   Erythema: absent  Sensation: normal  Pulse: present  Swelling: mild  Effusion: effusion (Mild) present    Comments:  Pain and limitations with range of motion.  Mild swelling/effusion noted.  No erythema.  No warmth.  No signs of infection noted.      Left Knee Exam     Tenderness   The patient is experiencing no tenderness.     Range of Motion   Extension: 0   Flexion: 130     Tests   Nora:  Medial - negative Lateral - negative  Varus: negative Valgus: negative    Other   Erythema: absent  Sensation: normal  Pulse: present  Swelling: none  Effusion: no effusion present            Xr Knee 1 Or 2 View Right    Result Date: 3/5/2020  Narrative: Lateral view of the right knee reveals no evidence of acute fracture or dislocation.  No significant degenerative changes are noted.  Patellofemoral joint spacing is well-maintained.  There is a small suprapatellar joint effusion noted.  Soft tissues appear unremarkable.  No acute bony radiologic abnormalities are noted at this time.  No significant changes are noted when compared with prior images from 1/22/2019.03/05/20 at 5:12 PM by LUIS Barrera     Xr Knee Bilateral Ap Standing    Result Date: 3/5/2020  Narrative: AP standing x-ray of bilateral knees reveals no evidence of acute fracture or dislocation.  There are degenerative changes noted  bilaterally, more pronounced in the right knee.  There is moderate to severe loss of lateral compartmental joint spacing noted in the right knee, which is a new finding when compared with prior images from 1/22/2019.  Medial compartmental joint spacing in the right knee is maintained.  There is mild valgus positioning noted of the right knee.  There is mild loss of medial compartmental joint spacing noted in the left knee and mild varus positioning noted.  Soft tissues appear unremarkable.  No acute bony radiologic abnormalities are noted at this time.03/05/20 at 5:11 PM by LUIS Barrera     Large Joint Arthrocentesis: R knee  Date/Time: 3/5/2020 4:34 PM  Consent given by: patient  Timeout: Immediately prior to procedure a time out was called to verify the correct patient, procedure, equipment, support staff and site/side marked as required   Supporting Documentation  Indications: pain, joint swelling and diagnostic evaluation   Procedure Details  Location: knee - R knee  Preparation: Patient was prepped and draped in the usual sterile fashion  Needle size: 22 G  Approach: anterolateral  Medications administered: 2 mL lidocaine PF 1% 1 %; 40 mg triamcinolone acetonide 40 MG/ML  Patient tolerance: patient tolerated the procedure well with no immediate complications          ASSESSMENT:    Diagnoses and all orders for this visit:    Chronic pain of right knee  -     Large Joint Arthrocentesis: R knee  -     MRI Knee Right Without Contrast; Future    Primary osteoarthritis of right knee  -     Large Joint Arthrocentesis: R knee  -     MRI Knee Right Without Contrast; Future    Internal derangement of right knee  -     MRI Knee Right Without Contrast; Future    PLAN    AP standing x-ray of the bilateral knees with a lateral x-ray of the right knee repeated in office today and reviewed with no acute findings noted.  We discussed that the degenerative changes in the lateral compartment of her right knee appear to  have progressed with further loss of joint spacing.  Patient continues to complain of persistent right knee pain, which started in January 2019 and her pain occurred acutely while carrying some groceries up some stairs.  Patient has been treated previously with 2 prior intra-articular injections of steroid, which offered her good pain relief for a few months at a time.  Patient wants to proceed with MRI imaging for further evaluation due to her persistent pain.  Recommend MRI of the right knee to evaluate for meniscal tear, ligament injury, stress fracture and/or cartilage abnormalities/defect/chondromalacia.  Recommend a repeat intra-articular injection of steroid today to the right knee for management of joint pain/inflammation/swelling.  She is not having any mechanical symptoms in the right knee.  Recommend Tylenol, Aleve or ibuprofen as needed for pain.  Recommend elevation of the right knee to minimize swelling.  Recommend ice therapy to the right knee intermittently 3-4 times daily for 20 minutes at a time to minimize pain/inflammation/swelling.  Recommend modified weightbearing off the right knee with use of a cane.  Recommend rest and activity modification as tolerated and based on her pain.  We discussed gradual progression of weightbearing and activity as pain and swelling allow.  Follow-up after MRI.  Consider referral to physical therapy.  Patient may also benefit from starting a prescription oral NSAID for improved control of joint pain/inflammation, plan to discuss further at follow-up.    Return for follow up after MRI.        This document has been electronically signed by LUIS Barrera on March 8, 2020 9:24 PM      LUIS Barrera      used

## 2020-03-08 PROBLEM — M17.11 PRIMARY OSTEOARTHRITIS OF RIGHT KNEE: Status: ACTIVE | Noted: 2020-03-08

## 2020-03-08 PROBLEM — M23.91 INTERNAL DERANGEMENT OF RIGHT KNEE: Status: ACTIVE | Noted: 2020-03-08

## 2020-03-08 RX ORDER — TRIAMCINOLONE ACETONIDE 40 MG/ML
40 INJECTION, SUSPENSION INTRA-ARTICULAR; INTRAMUSCULAR
Status: COMPLETED | OUTPATIENT
Start: 2020-03-05 | End: 2020-03-05

## 2020-03-08 RX ORDER — LIDOCAINE HYDROCHLORIDE 10 MG/ML
2 INJECTION, SOLUTION EPIDURAL; INFILTRATION; INTRACAUDAL; PERINEURAL
Status: COMPLETED | OUTPATIENT
Start: 2020-03-05 | End: 2020-03-05

## 2020-03-17 ENCOUNTER — HOSPITAL ENCOUNTER (OUTPATIENT)
Dept: MRI IMAGING | Facility: HOSPITAL | Age: 65
Discharge: HOME | End: 2020-03-17
Admitting: NURSE PRACTITIONER

## 2020-03-17 DIAGNOSIS — M25.561 CHRONIC PAIN OF RIGHT KNEE: ICD-10-CM

## 2020-03-17 DIAGNOSIS — G89.29 CHRONIC PAIN OF RIGHT KNEE: ICD-10-CM

## 2020-03-17 DIAGNOSIS — M23.91 INTERNAL DERANGEMENT OF RIGHT KNEE: ICD-10-CM

## 2020-03-17 DIAGNOSIS — M17.11 PRIMARY OSTEOARTHRITIS OF RIGHT KNEE: ICD-10-CM

## 2020-03-17 PROCEDURE — 73721 MRI JNT OF LWR EXTRE W/O DYE: CPT

## 2020-03-19 ENCOUNTER — OFFICE VISIT (OUTPATIENT)
Dept: ORTHOPEDIC SURGERY | Facility: CLINIC | Age: 65
End: 2020-03-19

## 2020-03-19 VITALS — HEIGHT: 67 IN | BODY MASS INDEX: 28.31 KG/M2 | WEIGHT: 180.4 LBS

## 2020-03-19 DIAGNOSIS — M25.561 CHRONIC PAIN OF RIGHT KNEE: Primary | ICD-10-CM

## 2020-03-19 DIAGNOSIS — G89.29 CHRONIC PAIN OF RIGHT KNEE: Primary | ICD-10-CM

## 2020-03-19 DIAGNOSIS — M17.11 PRIMARY OSTEOARTHRITIS OF RIGHT KNEE: ICD-10-CM

## 2020-03-19 DIAGNOSIS — S83.271A COMPLEX TEAR OF LATERAL MENISCUS OF RIGHT KNEE AS CURRENT INJURY, INITIAL ENCOUNTER: ICD-10-CM

## 2020-03-19 DIAGNOSIS — M23.91 INTERNAL DERANGEMENT OF RIGHT KNEE: ICD-10-CM

## 2020-03-19 DIAGNOSIS — M94.261 CHONDROMALACIA OF RIGHT KNEE: ICD-10-CM

## 2020-03-19 DIAGNOSIS — M71.21 SYNOVIAL CYST OF RIGHT POPLITEAL SPACE: ICD-10-CM

## 2020-03-19 PROCEDURE — 99214 OFFICE O/P EST MOD 30 MIN: CPT | Performed by: NURSE PRACTITIONER

## 2020-03-19 RX ORDER — CELECOXIB 200 MG/1
200 CAPSULE ORAL DAILY
Qty: 30 CAPSULE | Refills: 3 | Status: SHIPPED | OUTPATIENT
Start: 2020-03-19 | End: 2020-07-13

## 2020-03-19 NOTE — PROGRESS NOTES
"Ann Marie Malik is a 64 y.o. female returns for     Chief Complaint   Patient presents with   • Right Knee - Follow-up   • Results     MRI [facility] 3/17/20       HISTORY OF PRESENT ILLNESS: Patient presents office for follow-up of chronic right knee pain and MRI results of right knee.  Onset of pain occurred in January 2019 while carrying some groceries up some stairs.  She had no known injury.  She has had intermittent right knee pain since that time that has continued to progressively worsen.  Patient was last seen in office on 3/5/2024 increased right knee pain and difficulty ambulating due to pain.  She was given an intra-articular injection of steroid at that time, which is offered her good pain relief.  She reports her right knee pain and swelling are improved today.  No new complaints or concerns noted.     CONCURRENT MEDICAL HISTORY:    The following portions of the patient's history were reviewed and updated as appropriate: allergies, current medications, past family history, past medical history, past social history, past surgical history and problem list.     ROS  No fevers or chills.  No chest pain or shortness of air.  No GI or  disturbances. Right knee pain.     PHYSICAL EXAMINATION:       Ht 170.2 cm (67\")   Wt 81.8 kg (180 lb 6.4 oz)   BMI 28.25 kg/m²     Physical Exam   Constitutional: She is oriented to person, place, and time. Vital signs are normal. She appears well-developed and well-nourished. She is active and cooperative. She does not appear ill. No distress.   HENT:   Head: Normocephalic.   Pulmonary/Chest: Effort normal. No respiratory distress.   Abdominal: Soft. She exhibits no distension.   Musculoskeletal: She exhibits edema (Mild, right knee) and tenderness (Right knee). She exhibits no deformity.        Right knee: She exhibits effusion (Mild).        Left knee: She exhibits no effusion.   Neurological: She is alert and oriented to person, place, and time. GCS eye subscore is " 4. GCS verbal subscore is 5. GCS motor subscore is 6.   Skin: Skin is warm, dry and intact. Capillary refill takes less than 2 seconds. No erythema.   Psychiatric: She has a normal mood and affect. Her speech is normal and behavior is normal. Judgment and thought content normal. Cognition and memory are normal.   Vitals reviewed.      GAIT:     []  Normal  [x]  Antalgic    Assistive device: [x]  None  []  Walker     []  Crutches  []  Cane     []  Wheelchair  []  Stretcher    Right Knee Exam     Tenderness   The patient is experiencing tenderness in the lateral joint line (Diffuse).    Range of Motion   Extension: 0   Flexion: 120     Tests   Nora:  Medial - negative Lateral - positive  Varus: negative Valgus: negative    Other   Erythema: absent  Sensation: normal  Pulse: present  Swelling: mild  Effusion: effusion (Mild) present    Comments:  Pain and limitations with range of motion, improved from prior exam.  Mild swelling/effusion noted, improved from prior exam.  No erythema.  No warmth.  No signs of infection noted.       Left Knee Exam     Tenderness   The patient is experiencing no tenderness.     Range of Motion   Extension: 0   Flexion: 130     Tests   Nora:  Medial - negative Lateral - negative  Varus: negative Valgus: negative    Other   Erythema: absent  Sensation: normal  Pulse: present  Swelling: none  Effusion: no effusion present            Xr Knee 1 Or 2 View Right    Result Date: 3/5/2020  Narrative: Lateral view of the right knee reveals no evidence of acute fracture or dislocation.  No significant degenerative changes are noted.  Patellofemoral joint spacing is well-maintained.  There is a small suprapatellar joint effusion noted.  Soft tissues appear unremarkable.  No acute bony radiologic abnormalities are noted at this time.  No significant changes are noted when compared with prior images from 1/22/2019.03/05/20 at 5:12 PM by LUIS Barrera     Xr Knee Bilateral Ap  Standing    Result Date: 3/5/2020  Narrative: AP standing x-ray of bilateral knees reveals no evidence of acute fracture or dislocation.  There are degenerative changes noted bilaterally, more pronounced in the right knee.  There is moderate to severe loss of lateral compartmental joint spacing noted in the right knee, which is a new finding when compared with prior images from 1/22/2019.  Medial compartmental joint spacing in the right knee is maintained.  There is mild valgus positioning noted of the right knee.  There is mild loss of medial compartmental joint spacing noted in the left knee and mild varus positioning noted.  Soft tissues appear unremarkable.  No acute bony radiologic abnormalities are noted at this time.03/05/20 at 5:11 PM by LUIS Barrera     Mri Knee Right Without Contrast    Result Date: 3/17/2020  Narrative: MRI right knee without contrast. HISTORY: Right knee pain. Prior exam: Right knee March 5, 2020. TECHNIQUE: Multiplanar multisequence noncontrast images right knee. FINDINGS: Normal intact posterior cruciate ligament. Anterior cruciate ligament is intact but demonstrates thickening  and increased signal intensity, suggesting partial thickness interstitial tear or more likely sequela of chronic synovial inflammatory  changes. Normal medial meniscus. Subtle oblique oriented tear posterior horn lateral meniscus. Complex tear anterior horn lateral meniscus. Normal patellar and quadriceps tendon. Normal patellar articular hyaline cartilage. Extensive abnormality lateral aspect tibiofemoral joint with loss of cartilage, areas of grade 3 and grade IV chondromalacia distal femur and proximal tibia lateral aspect. Subtle bone edema distal femur and proximal tibia lateral aspect either contusion or reactive stress changes. Small effusion. Moderate size popliteal, Baker's cyst 4.47 x 1.35 x 1.55 cm in diameter.     Impression: Subtle oblique oriented tear posterior horn lateral meniscus.  Complex tear anterior horn lateral meniscus. Degenerative osteoarthritic changes lateral aspect right tibiofemoral joint. There is joint space narrowing, extensive areas of grade 3 and grade IV chondromalacia, osteochondral defects distal femur and proximal tibia lateral aspect. There is also bone edema distal femur proximal tibia either contusion or more likely reactive stress changes. Increased signal intensity involving the inferior surface of the anterior cruciate ligament either partial thickness interstitial tear more likely sequela of chronic synovial inflammatory changes. Small effusion. Popliteal, Baker's cyst. MRI right knee is otherwise unremarkable. Electronically signed by:  Andrew Ray MD  3/17/2020 2:04 PM CDT Workstation: [id number]        ASSESSMENT:    Diagnoses and all orders for this visit:    Chronic pain of right knee    Primary osteoarthritis of right knee    Internal derangement of right knee    Complex tear of lateral meniscus of right knee as current injury, initial encounter    Chondromalacia of right knee    Synovial cyst of right popliteal space    Other orders  -     celecoxib (CeleBREX) 200 MG capsule; Take 1 capsule by mouth Daily.    PLAN    MRI of right knee reviewed and results discussed with patient.  We discussed evidence of degenerative osteoarthritic changes in the lateral compartment of the right knee.  We discussed joint space narrowing and extensive areas of grades 3 and 4 chondromalacia.  We discussed evidence of bony edema in the distal femur and proximal tibia, likely stress reactive changes.  We discussed evidence of a complex tear in the lateral meniscus, a small joint effusion and a moderate sized Baker's cyst.  We discussed continuing conservative treatment at this time.  We also discussed that she may need surgical intervention, but currently all elective surgical procedures on hold for minimum of 30 days so our only options at this time are to continue with  conservative treatment efforts.  Patient does report her pain and swelling are much improved since receiving the intra-articular injection of steroid at last office visit on 3/5/2020.  Recommend use of a cane for modified weightbearing off the right knee to facilitate healing and improve her pain.  We discussed the importance of this, especially in regards to the stress reactive changes seen with associated bony edema.  Recommend rest and activity modification as tolerated and based on her pain.  We discussed that she can gradually increase her weightbearing and activity as her knee pain and swelling allow.  Recommend elevation and ice therapy to the right knee as needed to minimize pain/swelling/inflammation.  Recommend starting a prescription oral NSAID in an effort to improve her joint pain/swelling.  Celebrex is prescribed today. Patient is instructed to take the medication daily for consistent dosing.  Patient is instructed to take the medication with food to minimize any potential GI upset.  Patient is instructed not to take any additional over-the-counter oral NSAIDs, such as Ibuprofen or Aleve, while taking Celebrex.  Patient can also take Tylenol as needed for additional pain control.  Follow-up in 6 weeks for recheck as needed for any new, worsening or persistent symptoms.    Return in about 6 weeks (around 4/30/2020), or if symptoms worsen or fail to improve, for Recheck.        This document has been electronically signed by LUIS Barrera on March 23, 2020 09:30      LUIS Barrera

## 2020-03-23 PROBLEM — S83.271A COMPLEX TEAR OF LATERAL MENISCUS OF RIGHT KNEE AS CURRENT INJURY: Status: ACTIVE | Noted: 2020-03-23

## 2020-03-23 PROBLEM — M94.261 CHONDROMALACIA OF RIGHT KNEE: Status: ACTIVE | Noted: 2020-03-23

## 2020-03-23 PROBLEM — M71.21 SYNOVIAL CYST OF RIGHT POPLITEAL SPACE: Status: ACTIVE | Noted: 2020-03-23

## 2020-04-10 ENCOUNTER — TELEPHONE (OUTPATIENT)
Dept: FAMILY MEDICINE CLINIC | Facility: CLINIC | Age: 65
End: 2020-04-10

## 2020-04-10 DIAGNOSIS — I10 ESSENTIAL HYPERTENSION: ICD-10-CM

## 2020-04-10 RX ORDER — VALSARTAN 80 MG/1
80 TABLET ORAL DAILY
Qty: 90 TABLET | Refills: 1 | Status: SHIPPED | OUTPATIENT
Start: 2020-04-10 | End: 2020-04-10 | Stop reason: SDUPTHER

## 2020-04-10 RX ORDER — VALSARTAN 80 MG/1
80 TABLET ORAL DAILY
Qty: 90 TABLET | Refills: 1 | Status: SHIPPED | OUTPATIENT
Start: 2020-04-10 | End: 2020-08-11 | Stop reason: SDUPTHER

## 2020-06-08 ENCOUNTER — OFFICE VISIT (OUTPATIENT)
Dept: ORTHOPEDIC SURGERY | Facility: CLINIC | Age: 65
End: 2020-06-08

## 2020-06-08 VITALS — HEIGHT: 67 IN | WEIGHT: 184 LBS | BODY MASS INDEX: 28.88 KG/M2

## 2020-06-08 DIAGNOSIS — G89.29 CHRONIC PAIN OF RIGHT KNEE: Primary | ICD-10-CM

## 2020-06-08 DIAGNOSIS — M94.261 CHONDROMALACIA OF RIGHT KNEE: ICD-10-CM

## 2020-06-08 DIAGNOSIS — S83.271D COMPLEX TEAR OF LATERAL MENISCUS OF RIGHT KNEE AS CURRENT INJURY, SUBSEQUENT ENCOUNTER: ICD-10-CM

## 2020-06-08 DIAGNOSIS — M23.91 INTERNAL DERANGEMENT OF RIGHT KNEE: ICD-10-CM

## 2020-06-08 DIAGNOSIS — M71.21 SYNOVIAL CYST OF RIGHT POPLITEAL SPACE: ICD-10-CM

## 2020-06-08 DIAGNOSIS — M17.11 PRIMARY OSTEOARTHRITIS OF RIGHT KNEE: ICD-10-CM

## 2020-06-08 DIAGNOSIS — M25.561 CHRONIC PAIN OF RIGHT KNEE: Primary | ICD-10-CM

## 2020-06-08 PROCEDURE — 20610 DRAIN/INJ JOINT/BURSA W/O US: CPT | Performed by: NURSE PRACTITIONER

## 2020-06-08 RX ADMIN — LIDOCAINE HYDROCHLORIDE 2 ML: 10 INJECTION, SOLUTION EPIDURAL; INFILTRATION; INTRACAUDAL; PERINEURAL at 13:19

## 2020-06-08 RX ADMIN — TRIAMCINOLONE ACETONIDE 40 MG: 40 INJECTION, SUSPENSION INTRA-ARTICULAR; INTRAMUSCULAR at 13:19

## 2020-06-08 NOTE — PROGRESS NOTES
"Ann Marie Malik is a 64 y.o. female returns for     Chief Complaint   Patient presents with   • Right Knee - Follow-up, Pain       HISTORY OF PRESENT ILLNESS: Patient presents to office for follow-up of chronic right knee pain.  Patient requests an evaluation today for repeat injection to help with her pain.  Last injection was given on 3/5/2020.  Patient had onset of right knee pain in January 2019 that seemed to start abruptly while carrying some groceries up a flight of stairs.  She had no known injury.  She has had intermittent right knee pain since that time, which has continued to progressively worsen.  MRI imaging from March 2020 indicated a complex tear of the lateral meniscus, osteoarthritic degenerative changes in the lateral knee, bone edema and chondromalacia.  Patient has had intermittent improvement with intra-articular injections of steroid.  She continues to take Celebrex daily as well.  She has utilized a cane in the past during times of increased pain, with improvement.  No new complaints or concerns noted.  No recent injuries or falls.     CONCURRENT MEDICAL HISTORY:    The following portions of the patient's history were reviewed and updated as appropriate: allergies, current medications, past family history, past medical history, past social history, past surgical history and problem list.     ROS  No fevers or chills.  No chest pain or shortness of air.  No GI or  disturbances. Right knee pain.     PHYSICAL EXAMINATION:       Ht 170.2 cm (67\")   Wt 83.5 kg (184 lb)   BMI 28.82 kg/m²     Physical Exam   Constitutional: She is oriented to person, place, and time. Vital signs are normal. She appears well-developed and well-nourished. She is active and cooperative. She does not appear ill. No distress.   HENT:   Head: Normocephalic.   Pulmonary/Chest: Effort normal. No respiratory distress.   Abdominal: Soft. She exhibits no distension.   Musculoskeletal: She exhibits edema (Mild, right " knee) and tenderness (Right knee). She exhibits no deformity.        Right knee: She exhibits effusion (Mild).        Left knee: She exhibits no effusion.   Neurological: She is alert and oriented to person, place, and time. GCS eye subscore is 4. GCS verbal subscore is 5. GCS motor subscore is 6.   Skin: Skin is warm, dry and intact. Capillary refill takes less than 2 seconds. No erythema.   Psychiatric: She has a normal mood and affect. Her speech is normal and behavior is normal. Judgment and thought content normal. Cognition and memory are normal.   Vitals reviewed.      GAIT:     []  Normal  [x]  Antalgic    Assistive device: [x]  None  []  Walker     []  Crutches  []  Cane     []  Wheelchair  []  Stretcher      Xr Knee 1 Or 2 View Right     Result Date: 3/5/2020  Narrative: Lateral view of the right knee reveals no evidence of acute fracture or dislocation.  No significant degenerative changes are noted.  Patellofemoral joint spacing is well-maintained.  There is a small suprapatellar joint effusion noted.  Soft tissues appear unremarkable.  No acute bony radiologic abnormalities are noted at this time.  No significant changes are noted when compared with prior images from 1/22/2019.03/05/20 at 5:12 PM by LUIS Barrera      Xr Knee Bilateral Ap Standing     Result Date: 3/5/2020  Narrative: AP standing x-ray of bilateral knees reveals no evidence of acute fracture or dislocation.  There are degenerative changes noted bilaterally, more pronounced in the right knee.  There is moderate to severe loss of lateral compartmental joint spacing noted in the right knee, which is a new finding when compared with prior images from 1/22/2019.  Medial compartmental joint spacing in the right knee is maintained.  There is mild valgus positioning noted of the right knee.  There is mild loss of medial compartmental joint spacing noted in the left knee and mild varus positioning noted.  Soft tissues appear unremarkable.   No acute bony radiologic abnormalities are noted at this time.03/05/20 at 5:11 PM by LUIS Barrera      Mri Knee Right Without Contrast     Result Date: 3/17/2020  Narrative: MRI right knee without contrast. HISTORY: Right knee pain. Prior exam: Right knee March 5, 2020. TECHNIQUE: Multiplanar multisequence noncontrast images right knee. FINDINGS: Normal intact posterior cruciate ligament. Anterior cruciate ligament is intact but demonstrates thickening  and increased signal intensity, suggesting partial thickness interstitial tear or more likely sequela of chronic synovial inflammatory  changes. Normal medial meniscus. Subtle oblique oriented tear posterior horn lateral meniscus. Complex tear anterior horn lateral meniscus. Normal patellar and quadriceps tendon. Normal patellar articular hyaline cartilage. Extensive abnormality lateral aspect tibiofemoral joint with loss of cartilage, areas of grade 3 and grade IV chondromalacia distal femur and proximal tibia lateral aspect. Subtle bone edema distal femur and proximal tibia lateral aspect either contusion or reactive stress changes. Small effusion. Moderate size popliteal, Baker's cyst 4.47 x 1.35 x 1.55 cm in diameter.      Impression: Subtle oblique oriented tear posterior horn lateral meniscus. Complex tear anterior horn lateral meniscus. Degenerative osteoarthritic changes lateral aspect right tibiofemoral joint. There is joint space narrowing, extensive areas of grade 3 and grade IV chondromalacia, osteochondral defects distal femur and proximal tibia lateral aspect. There is also bone edema distal femur proximal tibia either contusion or more likely reactive stress changes. Increased signal intensity involving the inferior surface of the anterior cruciate ligament either partial thickness interstitial tear more likely sequela of chronic synovial inflammatory changes. Small effusion. Popliteal, Baker's cyst. MRI right knee is otherwise unremarkable.  Electronically signed by:  Andrew Ray MD  3/17/2020 2:04 PM CDT Workstation: [id number]      ASSESSMENT:    Diagnoses and all orders for this visit:    Chronic pain of right knee  -     Large Joint Arthrocentesis: R knee    Primary osteoarthritis of right knee  -     Large Joint Arthrocentesis: R knee    Internal derangement of right knee  -     Large Joint Arthrocentesis: R knee    Complex tear of lateral meniscus of right knee as current injury, subsequent encounter    Synovial cyst of right popliteal space    Chondromalacia of right knee      Large Joint Arthrocentesis: R knee  Date/Time: 6/8/2020 1:19 PM  Consent given by: patient  Timeout: Immediately prior to procedure a time out was called to verify the correct patient, procedure, equipment, support staff and site/side marked as required   Supporting Documentation  Indications: pain, diagnostic evaluation and joint swelling   Procedure Details  Location: knee - R knee  Preparation: Patient was prepped and draped in the usual sterile fashion  Needle size: 22 G  Approach: anterolateral  Medications administered: 2 mL lidocaine PF 1% 1 %; 40 mg triamcinolone acetonide 40 MG/ML  Patient tolerance: patient tolerated the procedure well with no immediate complications      PLAN    Patient complains of increased/recurrent and chronic right knee pain.  This is been an ongoing issue since January 2019.  Previous MRI imaging was positive for degenerative osteoarthritic changes in the lateral compartment of the knee, extensive areas of chondromalacia, bony edema and also a complex tear in the lateral meniscus.  Recommend a repeat intra-articular injection of steroid today for management of joint pain/inflammation.  Previous injections have offered her good pain relief temporarily.  We discussed continued conservative treatment efforts versus possible surgical consult.  Recommend use of a cane for modified weightbearing off the right knee as needed during times of  increased pain.  Recommend rest and activity modification as tolerated and based on her pain.  We again discussed that she can gradually increase her weightbearing and activity as pain and swelling allow.  Recommend elevation and ice therapy to the right knee as needed to minimize pain/swelling/inflammation.  Continue Celebrex daily for consistent dosing of oral NSAIDs.  Patient can also take Tylenol as needed for additional pain control.  Follow-up in 6 weeks for recheck as needed for any new, worsening or persistent symptoms.  Consider referral to one of the orthopedic surgeons to discuss possible surgical treatment options if her pain/symptoms persist.    Return in about 6 weeks (around 7/20/2020), or if symptoms worsen or fail to improve, for Recheck.        This document has been electronically signed by LUIS Barrera on June 9, 2020 20:36      LUIS Barrera

## 2020-06-09 RX ORDER — LIDOCAINE HYDROCHLORIDE 10 MG/ML
2 INJECTION, SOLUTION EPIDURAL; INFILTRATION; INTRACAUDAL; PERINEURAL
Status: COMPLETED | OUTPATIENT
Start: 2020-06-08 | End: 2020-06-08

## 2020-06-09 RX ORDER — TRIAMCINOLONE ACETONIDE 40 MG/ML
40 INJECTION, SUSPENSION INTRA-ARTICULAR; INTRAMUSCULAR
Status: COMPLETED | OUTPATIENT
Start: 2020-06-08 | End: 2020-06-08

## 2020-06-25 RX ORDER — ATORVASTATIN CALCIUM 40 MG/1
40 TABLET, FILM COATED ORAL DAILY
Qty: 90 TABLET | Refills: 1 | Status: SHIPPED | OUTPATIENT
Start: 2020-06-25 | End: 2020-12-15 | Stop reason: SDUPTHER

## 2020-06-27 ENCOUNTER — APPOINTMENT (OUTPATIENT)
Dept: CARDIOLOGY | Facility: HOSPITAL | Age: 65
End: 2020-06-27

## 2020-06-27 ENCOUNTER — APPOINTMENT (OUTPATIENT)
Dept: CT IMAGING | Facility: HOSPITAL | Age: 65
End: 2020-06-27

## 2020-06-27 ENCOUNTER — APPOINTMENT (OUTPATIENT)
Dept: MRI IMAGING | Facility: HOSPITAL | Age: 65
End: 2020-06-27

## 2020-06-27 ENCOUNTER — HOSPITAL ENCOUNTER (OUTPATIENT)
Facility: HOSPITAL | Age: 65
Setting detail: OBSERVATION
Discharge: HOME | End: 2020-06-28
Attending: FAMILY MEDICINE | Admitting: INTERNAL MEDICINE

## 2020-06-27 ENCOUNTER — APPOINTMENT (OUTPATIENT)
Dept: GENERAL RADIOLOGY | Facility: HOSPITAL | Age: 65
End: 2020-06-27

## 2020-06-27 DIAGNOSIS — I63.9 CEREBROVASCULAR ACCIDENT (CVA), UNSPECIFIED MECHANISM (HCC): Primary | ICD-10-CM

## 2020-06-27 DIAGNOSIS — Z78.9 IMPAIRED MOBILITY AND ACTIVITIES OF DAILY LIVING: ICD-10-CM

## 2020-06-27 DIAGNOSIS — Z74.09 IMPAIRED MOBILITY AND ACTIVITIES OF DAILY LIVING: ICD-10-CM

## 2020-06-27 LAB
ABO GROUP BLD: NORMAL
ALBUMIN SERPL-MCNC: 4.2 G/DL (ref 3.5–5.2)
ALBUMIN/GLOB SERPL: 1.7 G/DL
ALP SERPL-CCNC: 173 U/L (ref 39–117)
ALT SERPL W P-5'-P-CCNC: 18 U/L (ref 1–33)
ANION GAP SERPL CALCULATED.3IONS-SCNC: 12 MMOL/L (ref 5–15)
APTT PPP: 23.5 SECONDS (ref 20–40.3)
AST SERPL-CCNC: 20 U/L (ref 1–32)
BASOPHILS # BLD AUTO: 0.06 10*3/MM3 (ref 0–0.2)
BASOPHILS NFR BLD AUTO: 1 % (ref 0–1.5)
BH CV ECHO MEAS - ACS: 2.2 CM
BH CV ECHO MEAS - AO MAX PG (FULL): 3.8 MMHG
BH CV ECHO MEAS - AO MAX PG: 7.5 MMHG
BH CV ECHO MEAS - AO MEAN PG (FULL): 2 MMHG
BH CV ECHO MEAS - AO MEAN PG: 4 MMHG
BH CV ECHO MEAS - AO ROOT AREA (BSA CORRECTED): 1.6
BH CV ECHO MEAS - AO ROOT AREA: 7.5 CM^2
BH CV ECHO MEAS - AO ROOT DIAM: 3.1 CM
BH CV ECHO MEAS - AO V2 MAX: 137 CM/SEC
BH CV ECHO MEAS - AO V2 MEAN: 89.1 CM/SEC
BH CV ECHO MEAS - AO V2 VTI: 29.3 CM
BH CV ECHO MEAS - AVA(I,A): 2.5 CM^2
BH CV ECHO MEAS - AVA(I,D): 2.5 CM^2
BH CV ECHO MEAS - AVA(V,A): 2.2 CM^2
BH CV ECHO MEAS - AVA(V,D): 2.2 CM^2
BH CV ECHO MEAS - BSA(HAYCOCK): 2 M^2
BH CV ECHO MEAS - BSA: 2 M^2
BH CV ECHO MEAS - BZI_BMI: 29 KILOGRAMS/M^2
BH CV ECHO MEAS - BZI_METRIC_HEIGHT: 170.2 CM
BH CV ECHO MEAS - BZI_METRIC_WEIGHT: 83.9 KG
BH CV ECHO MEAS - EDV(CUBED): 85.8 ML
BH CV ECHO MEAS - EDV(MOD-SP2): 42.5 ML
BH CV ECHO MEAS - EDV(MOD-SP4): 59.6 ML
BH CV ECHO MEAS - EDV(TEICH): 88.2 ML
BH CV ECHO MEAS - EF(CUBED): 74.7 %
BH CV ECHO MEAS - EF(MOD-SP2): 38.4 %
BH CV ECHO MEAS - EF(MOD-SP4): 57 %
BH CV ECHO MEAS - EF(TEICH): 66.8 %
BH CV ECHO MEAS - ESV(CUBED): 21.7 ML
BH CV ECHO MEAS - ESV(MOD-SP2): 26.2 ML
BH CV ECHO MEAS - ESV(MOD-SP4): 25.6 ML
BH CV ECHO MEAS - ESV(TEICH): 29.3 ML
BH CV ECHO MEAS - FS: 36.7 %
BH CV ECHO MEAS - IVS/LVPW: 1.2
BH CV ECHO MEAS - IVSD: 1.1 CM
BH CV ECHO MEAS - LA DIMENSION: 3 CM
BH CV ECHO MEAS - LA/AO: 0.97
BH CV ECHO MEAS - LV DIASTOLIC VOL/BSA (35-75): 30.5 ML/M^2
BH CV ECHO MEAS - LV MASS(C)D: 139.8 GRAMS
BH CV ECHO MEAS - LV MASS(C)DI: 71.4 GRAMS/M^2
BH CV ECHO MEAS - LV MAX PG: 3.7 MMHG
BH CV ECHO MEAS - LV MEAN PG: 2 MMHG
BH CV ECHO MEAS - LV SYSTOLIC VOL/BSA (12-30): 13.1 ML/M^2
BH CV ECHO MEAS - LV V1 MAX: 96.8 CM/SEC
BH CV ECHO MEAS - LV V1 MEAN: 63.7 CM/SEC
BH CV ECHO MEAS - LV V1 VTI: 23.7 CM
BH CV ECHO MEAS - LVIDD: 4.4 CM
BH CV ECHO MEAS - LVIDS: 2.8 CM
BH CV ECHO MEAS - LVLD AP2: 6.8 CM
BH CV ECHO MEAS - LVLD AP4: 7.6 CM
BH CV ECHO MEAS - LVLS AP2: 6.5 CM
BH CV ECHO MEAS - LVLS AP4: 6.5 CM
BH CV ECHO MEAS - LVOT AREA (M): 3.1 CM^2
BH CV ECHO MEAS - LVOT AREA: 3.1 CM^2
BH CV ECHO MEAS - LVOT DIAM: 2 CM
BH CV ECHO MEAS - LVPWD: 0.87 CM
BH CV ECHO MEAS - MR MAX PG: 65.9 MMHG
BH CV ECHO MEAS - MR MAX VEL: 406 CM/SEC
BH CV ECHO MEAS - MV A MAX VEL: 101 CM/SEC
BH CV ECHO MEAS - MV DEC SLOPE: 346 CM/SEC^2
BH CV ECHO MEAS - MV E MAX VEL: 82.3 CM/SEC
BH CV ECHO MEAS - MV E/A: 0.81
BH CV ECHO MEAS - MV P1/2T MAX VEL: 79.3 CM/SEC
BH CV ECHO MEAS - MV P1/2T: 67.1 MSEC
BH CV ECHO MEAS - MVA P1/2T LCG: 2.8 CM^2
BH CV ECHO MEAS - MVA(P1/2T): 3.3 CM^2
BH CV ECHO MEAS - PA MAX PG (FULL): 1.1 MMHG
BH CV ECHO MEAS - PA MAX PG: 2.8 MMHG
BH CV ECHO MEAS - PA V2 MAX: 83.4 CM/SEC
BH CV ECHO MEAS - RAP SYSTOLE: 5 MMHG
BH CV ECHO MEAS - RV MAX PG: 1.7 MMHG
BH CV ECHO MEAS - RV MEAN PG: 1 MMHG
BH CV ECHO MEAS - RV V1 MAX: 64.6 CM/SEC
BH CV ECHO MEAS - RV V1 MEAN: 40.7 CM/SEC
BH CV ECHO MEAS - RV V1 VTI: 16 CM
BH CV ECHO MEAS - RVSP: 30.8 MMHG
BH CV ECHO MEAS - SI(AO): 113.1 ML/M^2
BH CV ECHO MEAS - SI(CUBED): 32.7 ML/M^2
BH CV ECHO MEAS - SI(LVOT): 38.1 ML/M^2
BH CV ECHO MEAS - SI(MOD-SP2): 8.3 ML/M^2
BH CV ECHO MEAS - SI(MOD-SP4): 17.4 ML/M^2
BH CV ECHO MEAS - SI(TEICH): 30.1 ML/M^2
BH CV ECHO MEAS - SV(AO): 221.1 ML
BH CV ECHO MEAS - SV(CUBED): 64 ML
BH CV ECHO MEAS - SV(LVOT): 74.5 ML
BH CV ECHO MEAS - SV(MOD-SP2): 16.3 ML
BH CV ECHO MEAS - SV(MOD-SP4): 34 ML
BH CV ECHO MEAS - SV(TEICH): 58.9 ML
BH CV ECHO MEAS - TR MAX VEL: 254 CM/SEC
BILIRUB SERPL-MCNC: 0.4 MG/DL (ref 0.2–1.2)
BLD GP AB SCN SERPL QL: NEGATIVE
BUN BLD-MCNC: 23 MG/DL (ref 8–23)
BUN/CREAT SERPL: 30.7 (ref 7–25)
CALCIUM SPEC-SCNC: 10.4 MG/DL (ref 8.6–10.5)
CHLORIDE SERPL-SCNC: 103 MMOL/L (ref 98–107)
CHOLEST SERPL-MCNC: 171 MG/DL (ref 0–200)
CO2 SERPL-SCNC: 25 MMOL/L (ref 22–29)
CREAT BLD-MCNC: 0.75 MG/DL (ref 0.57–1)
DEPRECATED RDW RBC AUTO: 44.7 FL (ref 37–54)
EOSINOPHIL # BLD AUTO: 0.16 10*3/MM3 (ref 0–0.4)
EOSINOPHIL NFR BLD AUTO: 2.7 % (ref 0.3–6.2)
ERYTHROCYTE [DISTWIDTH] IN BLOOD BY AUTOMATED COUNT: 13.3 % (ref 12.3–15.4)
GFR SERPL CREATININE-BSD FRML MDRD: 78 ML/MIN/1.73
GLOBULIN UR ELPH-MCNC: 2.5 GM/DL
GLUCOSE BLD-MCNC: 126 MG/DL (ref 65–99)
GLUCOSE BLDC GLUCOMTR-MCNC: 122 MG/DL (ref 70–130)
HCT VFR BLD AUTO: 42.8 % (ref 34–46.6)
HDLC SERPL-MCNC: 64 MG/DL (ref 40–60)
HGB BLD-MCNC: 14.8 G/DL (ref 12–15.9)
HOLD SPECIMEN: NORMAL
HOLD SPECIMEN: NORMAL
IMM GRANULOCYTES # BLD AUTO: 0.03 10*3/MM3 (ref 0–0.05)
IMM GRANULOCYTES NFR BLD AUTO: 0.5 % (ref 0–0.5)
INR PPP: 0.9 (ref 0.8–1.2)
LDLC SERPL CALC-MCNC: 76 MG/DL (ref 0–100)
LDLC/HDLC SERPL: 1.18 {RATIO}
LV EF 2D ECHO EST: 67 %
LYMPHOCYTES # BLD AUTO: 1.62 10*3/MM3 (ref 0.7–3.1)
LYMPHOCYTES NFR BLD AUTO: 26.9 % (ref 19.6–45.3)
Lab: NORMAL
MAXIMAL PREDICTED HEART RATE: 155 BPM
MCH RBC QN AUTO: 31.5 PG (ref 26.6–33)
MCHC RBC AUTO-ENTMCNC: 34.6 G/DL (ref 31.5–35.7)
MCV RBC AUTO: 91.1 FL (ref 79–97)
MONOCYTES # BLD AUTO: 0.41 10*3/MM3 (ref 0.1–0.9)
MONOCYTES NFR BLD AUTO: 6.8 % (ref 5–12)
NEUTROPHILS # BLD AUTO: 3.74 10*3/MM3 (ref 1.7–7)
NEUTROPHILS NFR BLD AUTO: 62.1 % (ref 42.7–76)
NRBC BLD AUTO-RTO: 0 /100 WBC (ref 0–0.2)
PLATELET # BLD AUTO: 268 10*3/MM3 (ref 140–450)
PMV BLD AUTO: 9.7 FL (ref 6–12)
POTASSIUM BLD-SCNC: 3.6 MMOL/L (ref 3.5–5.2)
PROT SERPL-MCNC: 6.7 G/DL (ref 6–8.5)
PROTHROMBIN TIME: 12 SECONDS (ref 11.1–15.3)
RBC # BLD AUTO: 4.7 10*6/MM3 (ref 3.77–5.28)
RH BLD: POSITIVE
SODIUM BLD-SCNC: 140 MMOL/L (ref 136–145)
STRESS TARGET HR: 132 BPM
T&S EXPIRATION DATE: NORMAL
TRIGL SERPL-MCNC: 156 MG/DL (ref 0–150)
TROPONIN T SERPL-MCNC: <0.01 NG/ML (ref 0–0.03)
VLDLC SERPL-MCNC: 31.2 MG/DL
WBC NRBC COR # BLD: 6.02 10*3/MM3 (ref 3.4–10.8)
WHOLE BLOOD HOLD SPECIMEN: NORMAL
WHOLE BLOOD HOLD SPECIMEN: NORMAL

## 2020-06-27 PROCEDURE — 93005 ELECTROCARDIOGRAM TRACING: CPT | Performed by: FAMILY MEDICINE

## 2020-06-27 PROCEDURE — 93306 TTE W/DOPPLER COMPLETE: CPT | Performed by: INTERNAL MEDICINE

## 2020-06-27 PROCEDURE — 93306 TTE W/DOPPLER COMPLETE: CPT

## 2020-06-27 PROCEDURE — 86901 BLOOD TYPING SEROLOGIC RH(D): CPT | Performed by: FAMILY MEDICINE

## 2020-06-27 PROCEDURE — G0378 HOSPITAL OBSERVATION PER HR: HCPCS

## 2020-06-27 PROCEDURE — 93010 ELECTROCARDIOGRAM REPORT: CPT | Performed by: INTERNAL MEDICINE

## 2020-06-27 PROCEDURE — 71045 X-RAY EXAM CHEST 1 VIEW: CPT

## 2020-06-27 PROCEDURE — 70450 CT HEAD/BRAIN W/O DYE: CPT

## 2020-06-27 PROCEDURE — 70496 CT ANGIOGRAPHY HEAD: CPT

## 2020-06-27 PROCEDURE — 84484 ASSAY OF TROPONIN QUANT: CPT | Performed by: FAMILY MEDICINE

## 2020-06-27 PROCEDURE — 85025 COMPLETE CBC W/AUTO DIFF WBC: CPT | Performed by: FAMILY MEDICINE

## 2020-06-27 PROCEDURE — 86850 RBC ANTIBODY SCREEN: CPT | Performed by: FAMILY MEDICINE

## 2020-06-27 PROCEDURE — 70551 MRI BRAIN STEM W/O DYE: CPT

## 2020-06-27 PROCEDURE — 99285 EMERGENCY DEPT VISIT HI MDM: CPT

## 2020-06-27 PROCEDURE — 85730 THROMBOPLASTIN TIME PARTIAL: CPT | Performed by: FAMILY MEDICINE

## 2020-06-27 PROCEDURE — 80053 COMPREHEN METABOLIC PANEL: CPT | Performed by: FAMILY MEDICINE

## 2020-06-27 PROCEDURE — 80061 LIPID PANEL: CPT | Performed by: INTERNAL MEDICINE

## 2020-06-27 PROCEDURE — 0 IOPAMIDOL PER 1 ML: Performed by: FAMILY MEDICINE

## 2020-06-27 PROCEDURE — 70498 CT ANGIOGRAPHY NECK: CPT

## 2020-06-27 PROCEDURE — 99213 OFFICE O/P EST LOW 20 MIN: CPT | Performed by: PSYCHIATRY & NEUROLOGY

## 2020-06-27 PROCEDURE — 96372 THER/PROPH/DIAG INJ SC/IM: CPT

## 2020-06-27 PROCEDURE — 86900 BLOOD TYPING SEROLOGIC ABO: CPT | Performed by: FAMILY MEDICINE

## 2020-06-27 PROCEDURE — 25010000002 ENOXAPARIN PER 10 MG: Performed by: INTERNAL MEDICINE

## 2020-06-27 PROCEDURE — 82962 GLUCOSE BLOOD TEST: CPT

## 2020-06-27 PROCEDURE — 85610 PROTHROMBIN TIME: CPT | Performed by: FAMILY MEDICINE

## 2020-06-27 RX ORDER — HYDROCHLOROTHIAZIDE 25 MG/1
25 TABLET ORAL DAILY
Status: DISCONTINUED | OUTPATIENT
Start: 2020-06-28 | End: 2020-06-28 | Stop reason: HOSPADM

## 2020-06-27 RX ORDER — CELECOXIB 200 MG/1
200 CAPSULE ORAL DAILY
Status: DISCONTINUED | OUTPATIENT
Start: 2020-06-28 | End: 2020-06-28 | Stop reason: HOSPADM

## 2020-06-27 RX ORDER — METOPROLOL TARTRATE 5 MG/5ML
5 INJECTION INTRAVENOUS
Status: DISCONTINUED | OUTPATIENT
Start: 2020-06-27 | End: 2020-06-27

## 2020-06-27 RX ORDER — SODIUM CHLORIDE 0.9 % (FLUSH) 0.9 %
10 SYRINGE (ML) INJECTION AS NEEDED
Status: DISCONTINUED | OUTPATIENT
Start: 2020-06-27 | End: 2020-06-28 | Stop reason: HOSPADM

## 2020-06-27 RX ORDER — ONDANSETRON 2 MG/ML
4 INJECTION INTRAMUSCULAR; INTRAVENOUS EVERY 6 HOURS PRN
Status: DISCONTINUED | OUTPATIENT
Start: 2020-06-27 | End: 2020-06-28 | Stop reason: HOSPADM

## 2020-06-27 RX ORDER — ASPIRIN 81 MG/1
81 TABLET, CHEWABLE ORAL ONCE
Status: COMPLETED | OUTPATIENT
Start: 2020-06-27 | End: 2020-06-27

## 2020-06-27 RX ORDER — ACETAMINOPHEN 325 MG/1
650 TABLET ORAL EVERY 4 HOURS PRN
Status: DISCONTINUED | OUTPATIENT
Start: 2020-06-27 | End: 2020-06-28 | Stop reason: HOSPADM

## 2020-06-27 RX ORDER — BISACODYL 5 MG/1
5 TABLET, DELAYED RELEASE ORAL DAILY PRN
Status: DISCONTINUED | OUTPATIENT
Start: 2020-06-27 | End: 2020-06-28 | Stop reason: HOSPADM

## 2020-06-27 RX ORDER — ATORVASTATIN CALCIUM 40 MG/1
40 TABLET, FILM COATED ORAL NIGHTLY
Status: DISCONTINUED | OUTPATIENT
Start: 2020-06-27 | End: 2020-06-28 | Stop reason: HOSPADM

## 2020-06-27 RX ORDER — ACETAMINOPHEN 160 MG/5ML
650 SOLUTION ORAL EVERY 4 HOURS PRN
Status: DISCONTINUED | OUTPATIENT
Start: 2020-06-27 | End: 2020-06-27

## 2020-06-27 RX ORDER — ACETAMINOPHEN 650 MG/1
650 SUPPOSITORY RECTAL EVERY 4 HOURS PRN
Status: DISCONTINUED | OUTPATIENT
Start: 2020-06-27 | End: 2020-06-28 | Stop reason: HOSPADM

## 2020-06-27 RX ORDER — VALSARTAN 80 MG/1
80 TABLET ORAL DAILY
Status: DISCONTINUED | OUTPATIENT
Start: 2020-06-28 | End: 2020-06-28 | Stop reason: HOSPADM

## 2020-06-27 RX ORDER — SODIUM CHLORIDE 0.9 % (FLUSH) 0.9 %
10 SYRINGE (ML) INJECTION EVERY 12 HOURS SCHEDULED
Status: DISCONTINUED | OUTPATIENT
Start: 2020-06-27 | End: 2020-06-28 | Stop reason: HOSPADM

## 2020-06-27 RX ORDER — ONDANSETRON 4 MG/1
4 TABLET, FILM COATED ORAL EVERY 6 HOURS PRN
Status: DISCONTINUED | OUTPATIENT
Start: 2020-06-27 | End: 2020-06-28 | Stop reason: HOSPADM

## 2020-06-27 RX ORDER — SODIUM CHLORIDE 9 MG/ML
75 INJECTION, SOLUTION INTRAVENOUS CONTINUOUS
Status: DISCONTINUED | OUTPATIENT
Start: 2020-06-27 | End: 2020-06-28 | Stop reason: HOSPADM

## 2020-06-27 RX ORDER — PANTOPRAZOLE SODIUM 40 MG/1
40 TABLET, DELAYED RELEASE ORAL EVERY MORNING
Status: DISCONTINUED | OUTPATIENT
Start: 2020-06-28 | End: 2020-06-28 | Stop reason: HOSPADM

## 2020-06-27 RX ADMIN — ATORVASTATIN CALCIUM 40 MG: 40 TABLET, FILM COATED ORAL at 20:26

## 2020-06-27 RX ADMIN — ASPIRIN 81 MG 81 MG: 81 TABLET ORAL at 11:21

## 2020-06-27 RX ADMIN — SODIUM CHLORIDE, PRESERVATIVE FREE 10 ML: 5 INJECTION INTRAVENOUS at 21:36

## 2020-06-27 RX ADMIN — IOPAMIDOL 90 ML: 755 INJECTION, SOLUTION INTRAVENOUS at 11:06

## 2020-06-27 RX ADMIN — ENOXAPARIN SODIUM 40 MG: 40 INJECTION SUBCUTANEOUS at 15:50

## 2020-06-28 ENCOUNTER — READMISSION MANAGEMENT (OUTPATIENT)
Dept: CALL CENTER | Facility: HOSPITAL | Age: 65
End: 2020-06-28

## 2020-06-28 VITALS
OXYGEN SATURATION: 95 % | TEMPERATURE: 97.7 F | HEART RATE: 74 BPM | RESPIRATION RATE: 18 BRPM | HEIGHT: 67 IN | WEIGHT: 167.99 LBS | SYSTOLIC BLOOD PRESSURE: 142 MMHG | BODY MASS INDEX: 26.37 KG/M2 | DIASTOLIC BLOOD PRESSURE: 69 MMHG

## 2020-06-28 LAB
ANION GAP SERPL CALCULATED.3IONS-SCNC: 12 MMOL/L (ref 5–15)
BASOPHILS # BLD AUTO: 0.07 10*3/MM3 (ref 0–0.2)
BASOPHILS NFR BLD AUTO: 1.1 % (ref 0–1.5)
BUN BLD-MCNC: 20 MG/DL (ref 8–23)
BUN/CREAT SERPL: 25.6 (ref 7–25)
CALCIUM SPEC-SCNC: 9.1 MG/DL (ref 8.6–10.5)
CHLORIDE SERPL-SCNC: 102 MMOL/L (ref 98–107)
CO2 SERPL-SCNC: 25 MMOL/L (ref 22–29)
CREAT BLD-MCNC: 0.78 MG/DL (ref 0.57–1)
DEPRECATED RDW RBC AUTO: 45.1 FL (ref 37–54)
EOSINOPHIL # BLD AUTO: 0.24 10*3/MM3 (ref 0–0.4)
EOSINOPHIL NFR BLD AUTO: 3.7 % (ref 0.3–6.2)
ERYTHROCYTE [DISTWIDTH] IN BLOOD BY AUTOMATED COUNT: 13.4 % (ref 12.3–15.4)
GFR SERPL CREATININE-BSD FRML MDRD: 74 ML/MIN/1.73
GLUCOSE BLD-MCNC: 106 MG/DL (ref 65–99)
HCT VFR BLD AUTO: 40.4 % (ref 34–46.6)
HGB BLD-MCNC: 13.8 G/DL (ref 12–15.9)
IMM GRANULOCYTES # BLD AUTO: 0.04 10*3/MM3 (ref 0–0.05)
IMM GRANULOCYTES NFR BLD AUTO: 0.6 % (ref 0–0.5)
LYMPHOCYTES # BLD AUTO: 1.83 10*3/MM3 (ref 0.7–3.1)
LYMPHOCYTES NFR BLD AUTO: 28.5 % (ref 19.6–45.3)
MCH RBC QN AUTO: 31.2 PG (ref 26.6–33)
MCHC RBC AUTO-ENTMCNC: 34.2 G/DL (ref 31.5–35.7)
MCV RBC AUTO: 91.2 FL (ref 79–97)
MONOCYTES # BLD AUTO: 0.57 10*3/MM3 (ref 0.1–0.9)
MONOCYTES NFR BLD AUTO: 8.9 % (ref 5–12)
NEUTROPHILS # BLD AUTO: 3.66 10*3/MM3 (ref 1.7–7)
NEUTROPHILS NFR BLD AUTO: 57.2 % (ref 42.7–76)
NRBC BLD AUTO-RTO: 0 /100 WBC (ref 0–0.2)
PLATELET # BLD AUTO: 239 10*3/MM3 (ref 140–450)
PMV BLD AUTO: 10 FL (ref 6–12)
POTASSIUM BLD-SCNC: 4.2 MMOL/L (ref 3.5–5.2)
RBC # BLD AUTO: 4.43 10*6/MM3 (ref 3.77–5.28)
SODIUM BLD-SCNC: 139 MMOL/L (ref 136–145)
WBC NRBC COR # BLD: 6.41 10*3/MM3 (ref 3.4–10.8)

## 2020-06-28 PROCEDURE — 99213 OFFICE O/P EST LOW 20 MIN: CPT | Performed by: PSYCHIATRY & NEUROLOGY

## 2020-06-28 PROCEDURE — 80048 BASIC METABOLIC PNL TOTAL CA: CPT | Performed by: INTERNAL MEDICINE

## 2020-06-28 PROCEDURE — 97161 PT EVAL LOW COMPLEX 20 MIN: CPT

## 2020-06-28 PROCEDURE — 85025 COMPLETE CBC W/AUTO DIFF WBC: CPT | Performed by: INTERNAL MEDICINE

## 2020-06-28 PROCEDURE — 97165 OT EVAL LOW COMPLEX 30 MIN: CPT

## 2020-06-28 PROCEDURE — G0378 HOSPITAL OBSERVATION PER HR: HCPCS

## 2020-06-28 RX ORDER — ASPIRIN 81 MG/1
81 TABLET, CHEWABLE ORAL DAILY
Status: DISCONTINUED | OUTPATIENT
Start: 2020-06-28 | End: 2020-06-28 | Stop reason: HOSPADM

## 2020-06-28 RX ORDER — ASPIRIN 81 MG/1
81 TABLET, CHEWABLE ORAL DAILY
Qty: 30 TABLET | Refills: 1 | Status: SHIPPED | OUTPATIENT
Start: 2020-06-29 | End: 2021-08-24 | Stop reason: HOSPADM

## 2020-06-28 RX ADMIN — ASPIRIN 81 MG 81 MG: 81 TABLET ORAL at 08:23

## 2020-06-28 RX ADMIN — PANTOPRAZOLE SODIUM 40 MG: 40 TABLET, DELAYED RELEASE ORAL at 08:23

## 2020-06-28 RX ADMIN — HYDROCHLOROTHIAZIDE 25 MG: 25 TABLET ORAL at 08:23

## 2020-06-28 RX ADMIN — SODIUM CHLORIDE, PRESERVATIVE FREE 10 ML: 5 INJECTION INTRAVENOUS at 08:26

## 2020-06-28 RX ADMIN — VALSARTAN 80 MG: 80 TABLET, FILM COATED ORAL at 08:23

## 2020-06-28 RX ADMIN — CELECOXIB 200 MG: 200 CAPSULE ORAL at 08:23

## 2020-06-28 NOTE — OUTREACH NOTE
Prep Survey      Responses   [facility] [place] patient discharged from?  [place]   Is LACE score < 7 ?  Yes   Eligibility  [facility]   Date of Admission  06/27/20   Date of Discharge  06/28/20   Discharge Disposition  Home or Self Care   Discharge diagnosis  Cerebrovascular accident (CVA)    COVID-19 Test Status  Not tested   Does the patient have one of the following disease processes/diagnoses(primary or secondary)?  Stroke (TIA)   Does the patient have Home health ordered?  No   Is there a DME ordered?  No   Prep survey completed?  Yes          Yelitza Walden RN

## 2020-06-29 ENCOUNTER — TRANSITIONAL CARE MANAGEMENT TELEPHONE ENCOUNTER (OUTPATIENT)
Dept: CALL CENTER | Facility: HOSPITAL | Age: 65
End: 2020-06-29

## 2020-06-29 NOTE — OUTREACH NOTE
Call Center TCM Note      Responses   [facility] patient discharged from?  [place]   Does the patient have one of the following disease processes/diagnoses(primary or secondary)?  Stroke (TIA)   TCM attempt successful?  Yes   Call start time  1227   Call end time  1231   Discharge diagnosis  Cerebrovascular accident (CVA)    Person spoke with today (if not patient) and relationship  Marin-spouse and patient   Meds reviewed with patient/caregiver?  Yes   Is the patient having any side effects they believe may be caused by any medication additions or changes?  No   Does the patient have all medications ordered at discharge?  Yes   Is the patient taking all medications as directed (includes completed medication regime)?  Yes   Does the patient have a primary care provider?   Yes   Does the patient have an appointment with their PCP within 7 days of discharge?  Yes   Comments regarding PCP  6/30/20 at 11:15 am   Has the patient kept scheduled appointments due by today?  N/A   Psychosocial issues?  No   Does the patient require any assistance with activities of daily living such as eating, bathing, dressing, walking, etc.?  No   Does the patient have any residual symptoms from stroke/TIA?  No   Does the patient understand the diet ordered at discharge?  No   Did the patient receive a copy of their discharge instructions?  Yes   Nursing interventions  Reviewed instructions with patient   What is the patient's perception of their health status since discharge?  Improving   Nursing interventions  Nurse provided patient education   Is the patient able to teach back FAST for Stroke?  Yes   Is the patient/caregiver able to teach back the risk factors for a stroke?  High blood pressure-goal below 120/80, Diabetes, High Cholesterol, Smoking, Sleep apnea [not a diabetic]   Is the patient/caregiver able to teach back signs and symptoms related to disease process for when to call PCP?  Yes   Is the patient/caregiver able  to teach back signs and symptoms related to disease process for when to call 911?  Yes   If the patient is a current smoker, are they able to teach back resources for cessation?  -- [Nonsmoker]   Is the patient/caregiver able to teach back the hierarchy of who to call/visit for symptoms/problems? PCP, Specialist, Home health nurse, Urgent Care, ED, 911  Yes   TCM call completed?  Yes          Devi Lawson RN    6/29/2020, 12:32

## 2020-06-30 ENCOUNTER — OFFICE VISIT (OUTPATIENT)
Dept: FAMILY MEDICINE CLINIC | Facility: CLINIC | Age: 65
End: 2020-06-30

## 2020-06-30 VITALS
DIASTOLIC BLOOD PRESSURE: 70 MMHG | HEART RATE: 71 BPM | BODY MASS INDEX: 27 KG/M2 | OXYGEN SATURATION: 95 % | HEIGHT: 67 IN | WEIGHT: 172 LBS | SYSTOLIC BLOOD PRESSURE: 120 MMHG

## 2020-06-30 DIAGNOSIS — Q21.12 PFO (PATENT FORAMEN OVALE): ICD-10-CM

## 2020-06-30 DIAGNOSIS — I63.9 CEREBROVASCULAR ACCIDENT (CVA), UNSPECIFIED MECHANISM (HCC): Primary | ICD-10-CM

## 2020-06-30 PROCEDURE — 99213 OFFICE O/P EST LOW 20 MIN: CPT | Performed by: FAMILY MEDICINE

## 2020-07-07 ENCOUNTER — READMISSION MANAGEMENT (OUTPATIENT)
Dept: CALL CENTER | Facility: HOSPITAL | Age: 65
End: 2020-07-07

## 2020-07-07 NOTE — OUTREACH NOTE
Stroke Week 2 Survey      Responses   [facility] patient discharged from?  [place]   Does the patient have one of the following disease processes/diagnoses(primary or secondary)?  Stroke (TIA)   Week 2 attempt successful?  Yes   Call start time  1125   Call end time  1126   Discharge diagnosis  Cerebrovascular accident (CVA)    Meds reviewed with patient/caregiver?  Yes   Is the patient having any side effects they believe may be caused by any medication additions or changes?  No   Does the patient have all medications ordered at discharge?  Yes   Is the patient taking all medications as directed (includes completed medication regime)?  Yes   Does the patient have a primary care provider?   Yes   Does the patient have an appointment with their PCP within 7 days of discharge?  Yes   Has the patient kept scheduled appointments due by today?  Yes   Has home health visited the patient within 72 hours of discharge?  N/A   Psychosocial issues?  No   Does the patient require any assistance with activities of daily living such as eating, bathing, dressing, walking, etc.?  No   Does the patient have any residual symptoms from stroke/TIA?  No   Does the patient understand the diet ordered at discharge?  Yes   Did the patient receive a copy of their discharge instructions?  Yes   Nursing interventions  Reviewed instructions with patient   What is the patient's perception of their health status since discharge?  Improving   Nursing interventions  Nurse provided patient education   Is the patient able to teach back FAST for Stroke?  Yes   Is the patient/caregiver able to teach back the risk factors for a stroke?  High blood pressure-goal below 120/80, Diabetes, High Cholesterol, Smoking, Sleep apnea   Is the patient/caregiver able to teach back signs and symptoms related to disease process for when to call PCP?  Yes   Is the patient/caregiver able to teach back signs and symptoms related to disease process for when to  call 911?  Yes   Is the patient/caregiver able to teach back the hierarchy of who to call/visit for symptoms/problems? PCP, Specialist, Home health nurse, Urgent Care, ED, 911  Yes   Week 2 call completed?  Yes          Ariel Ramirez RN

## 2020-07-13 RX ORDER — CELECOXIB 200 MG/1
CAPSULE ORAL
Qty: 30 CAPSULE | Refills: 3 | Status: SHIPPED | OUTPATIENT
Start: 2020-07-13 | End: 2020-08-11

## 2020-07-14 ENCOUNTER — READMISSION MANAGEMENT (OUTPATIENT)
Dept: CALL CENTER | Facility: HOSPITAL | Age: 65
End: 2020-07-14

## 2020-07-14 NOTE — OUTREACH NOTE
Stroke Week 3 Survey      Responses   [facility] patient discharged from?  [place]   Does the patient have one of the following disease processes/diagnoses(primary or secondary)?  Stroke (TIA)   Week 3 attempt successful?  No   Unsuccessful attempts  Attempt 1          Suzi Collins RN

## 2020-08-11 ENCOUNTER — OFFICE VISIT (OUTPATIENT)
Dept: FAMILY MEDICINE CLINIC | Facility: CLINIC | Age: 65
End: 2020-08-11

## 2020-08-11 VITALS
BODY MASS INDEX: 29.32 KG/M2 | OXYGEN SATURATION: 97 % | SYSTOLIC BLOOD PRESSURE: 144 MMHG | HEART RATE: 80 BPM | HEIGHT: 67 IN | WEIGHT: 186.8 LBS | DIASTOLIC BLOOD PRESSURE: 84 MMHG

## 2020-08-11 DIAGNOSIS — Z12.31 ENCOUNTER FOR SCREENING MAMMOGRAM FOR MALIGNANT NEOPLASM OF BREAST: ICD-10-CM

## 2020-08-11 DIAGNOSIS — I10 ESSENTIAL HYPERTENSION: ICD-10-CM

## 2020-08-11 DIAGNOSIS — I63.9 CEREBROVASCULAR ACCIDENT (CVA), UNSPECIFIED MECHANISM (HCC): Primary | ICD-10-CM

## 2020-08-11 DIAGNOSIS — K21.9 GASTROESOPHAGEAL REFLUX DISEASE, ESOPHAGITIS PRESENCE NOT SPECIFIED: ICD-10-CM

## 2020-08-11 DIAGNOSIS — Q21.12 PFO (PATENT FORAMEN OVALE): ICD-10-CM

## 2020-08-11 PROCEDURE — 99213 OFFICE O/P EST LOW 20 MIN: CPT | Performed by: FAMILY MEDICINE

## 2020-08-11 RX ORDER — NAPROXEN SODIUM 220 MG
220 TABLET ORAL 2 TIMES DAILY WITH MEALS
Qty: 60 TABLET | Refills: 2
Start: 2020-08-11 | End: 2020-12-23 | Stop reason: ALTCHOICE

## 2020-08-11 RX ORDER — HYDROCHLOROTHIAZIDE 25 MG/1
25 TABLET ORAL DAILY
Qty: 90 TABLET | Refills: 1 | Status: SHIPPED | OUTPATIENT
Start: 2020-08-11 | End: 2021-01-27 | Stop reason: SDUPTHER

## 2020-08-11 RX ORDER — VALSARTAN 80 MG/1
80 TABLET ORAL DAILY
Qty: 90 TABLET | Refills: 1
Start: 2020-08-11 | End: 2020-09-16

## 2020-08-11 RX ORDER — OMEPRAZOLE 20 MG/1
20 CAPSULE, DELAYED RELEASE ORAL DAILY
Qty: 90 CAPSULE | Refills: 1 | Status: SHIPPED | OUTPATIENT
Start: 2020-08-11 | End: 2021-01-27 | Stop reason: SDUPTHER

## 2020-08-11 NOTE — PROGRESS NOTES
"Follow Up Office Visit          Ann Marie Malik is a 64 y.o. female that presents today for   Chief Complaint   Patient presents with   • Med Refill       HPI    Patient is overall doing very well.  She saw the cardiologist for consultation, and reports PFO repair was not recommended.  PFO was not thought to be a contributing factor to patient's recent CVA.  Patient has been doing well since last visit.  No new concerns.  She is currently taking valsartan 80 mg and hydrochlorothiazide 25 mg daily.  No adverse effects from these medications.  Blood pressure has been below goal at home.  She is also taking aspirin and Lipitor.  Patient has decided not to schedule an appointment with neurology.  She is feeling less nervous than she was initially after her stroke.  Patient does need refills of her medication today.    The following portions of the patient's history were reviewed and updated as appropriate: allergies, current medications, past medical history and problem list.    Review of Systems   Constitutional: Negative for chills and fever.   HENT: Negative for congestion and sinus pain.    Respiratory: Negative for cough and shortness of breath.    Cardiovascular: Negative for chest pain and leg swelling.   Gastrointestinal: Negative for abdominal pain, diarrhea, nausea and vomiting.   Musculoskeletal: Negative for back pain, gait problem and joint swelling.   Skin: Negative for rash.   Neurological: Negative for weakness and headaches.   Psychiatric/Behavioral: Negative for dysphoric mood and sleep disturbance.           Vitals:    08/11/20 1257   BP: 144/84   BP Location: Left arm   Patient Position: Sitting   Pulse: 80   SpO2: 97%   Weight: 84.7 kg (186 lb 12.8 oz)   Height: 170.2 cm (67\")     Body mass index is 29.26 kg/m².    Physical Exam   Constitutional: She is oriented to person, place, and time. She appears well-developed and well-nourished. No distress.   HENT:   Head: Normocephalic and atraumatic. "   Eyes: EOM are normal.   Neck: Neck supple.   Cardiovascular: Normal rate, regular rhythm and normal heart sounds.   No murmur heard.  Pulmonary/Chest: Effort normal and breath sounds normal. No respiratory distress. She has no wheezes. She has no rales.   Abdominal: Soft. There is no tenderness.   Musculoskeletal: She exhibits no edema.   Neurological: She is alert and oriented to person, place, and time.   Skin: Skin is warm and dry. No rash noted.   Psychiatric: She has a normal mood and affect.   Vitals reviewed.      Assessment/Plan   Ann Marie was seen today for med refill.    Diagnoses and all orders for this visit:    Cerebrovascular accident (CVA), unspecified mechanism (CMS/HCC)    PFO (patent foramen ovale)    Essential hypertension  -     hydroCHLOROthiazide (HYDRODIURIL) 25 MG tablet; Take 1 tablet by mouth Daily.  -     valsartan (Diovan) 80 MG tablet; Take 1 tablet by mouth Daily for 90 days.    Gastroesophageal reflux disease, esophagitis presence not specified  -     omeprazole (priLOSEC) 20 MG capsule; Take 1 capsule by mouth Daily.    Encounter for screening mammogram for malignant neoplasm of breast  -     Mammo Screening Digital Tomosynthesis Bilateral With CAD; Future    Other orders  -     naproxen sodium (Aleve) 220 MG tablet; Take 1 tablet by mouth 2 (Two) Times a Day With Meals.      Patient doing well since last visit.  No residual effects from previous CVA.  Cardiology consultation note reviewed.  No indication for PFO closure at this time.  Patient understands, and was appreciative to have this consultation.  Systolic blood pressure slightly elevated today.  Patient notes that home blood pressures are normal.  Advised to monitor closely, as hypertension is a risk factor for having another stroke.  She will continue hydrochlorothiazide 25 mg daily and valsartan 80 mg daily.  Will monitor blood pressure at home, and call next week if having any elevations.  Medication adjustment can be  made at that time.  Patient should continue aspirin and statin medications.  Refills of Prilosec and Aleve provided today.  Patient due for mammogram, so screening mammogram ordered today.      Return in about 3 months (around 11/11/2020) for Recheck HTN.           This document has been electronically signed by Verito He MD on August 11, 2020 13:07

## 2020-08-25 ENCOUNTER — TELEPHONE (OUTPATIENT)
Dept: FAMILY MEDICINE CLINIC | Facility: CLINIC | Age: 65
End: 2020-08-25

## 2020-08-25 NOTE — TELEPHONE ENCOUNTER
Blood pressure readings submitted for review.  Blood pressures look good.  Patient should continue current antihypertensive regimen.  Should continue to monitor blood pressure 2-3 times weekly, and call with any persistent elevations.  Also received mammogram report, which was negative.  Plan to repeat in 1 year.  Thanks, Terra

## 2020-09-01 DIAGNOSIS — Z12.31 ENCOUNTER FOR SCREENING MAMMOGRAM FOR MALIGNANT NEOPLASM OF BREAST: ICD-10-CM

## 2020-09-16 ENCOUNTER — TELEPHONE (OUTPATIENT)
Dept: FAMILY MEDICINE CLINIC | Facility: CLINIC | Age: 65
End: 2020-09-16

## 2020-09-16 RX ORDER — VALSARTAN 160 MG/1
160 TABLET ORAL DAILY
Qty: 30 TABLET | Refills: 2 | Status: SHIPPED | OUTPATIENT
Start: 2020-09-16 | End: 2020-10-19 | Stop reason: SDUPTHER

## 2020-09-16 NOTE — TELEPHONE ENCOUNTER
Called and spoke with patient.  She notes that her blood pressure has been running high over the last couple of weeks.  Top number has been in the 140s.  Bottom number has been between .  At first she thought it was just a fluke, but it has stayed this way.  No changes to diet or activity that she can think of recently.  Will increase valsartan to 160 mg daily.  New prescription sent to the pharmacy.  Patient advised to monitor over the next couple weeks and let me know if there are any persistent elevations greater than 140/90.  Thanks, Terra

## 2020-09-16 NOTE — TELEPHONE ENCOUNTER
PATIENT CALLING IN STATING THAT THE LAST TWO WEEKS SHE HAS BEEN HAVING SOME HIGH BLOOD PRESSURE.     PATIENT STATES THAT HER BOTTOM NUMBERS HAVE BEEN IN THE HIGH 90S.    CALL BACK NUMBER: [phone]     CONFIRMED PHARMACY: [facility] [address] [place] [address] [address] - [phone] [facility] [phone]   [phone]

## 2020-09-25 ENCOUNTER — OFFICE VISIT (OUTPATIENT)
Dept: ORTHOPEDIC SURGERY | Facility: CLINIC | Age: 65
End: 2020-09-25

## 2020-09-25 VITALS — BODY MASS INDEX: 28.49 KG/M2 | WEIGHT: 181.5 LBS | HEIGHT: 67 IN

## 2020-09-25 DIAGNOSIS — M94.261 CHONDROMALACIA OF RIGHT KNEE: ICD-10-CM

## 2020-09-25 DIAGNOSIS — G89.29 CHRONIC PAIN OF RIGHT KNEE: Primary | ICD-10-CM

## 2020-09-25 DIAGNOSIS — M23.91 INTERNAL DERANGEMENT OF RIGHT KNEE: ICD-10-CM

## 2020-09-25 DIAGNOSIS — S83.271D COMPLEX TEAR OF LATERAL MENISCUS OF RIGHT KNEE AS CURRENT INJURY, SUBSEQUENT ENCOUNTER: ICD-10-CM

## 2020-09-25 DIAGNOSIS — M25.561 CHRONIC PAIN OF RIGHT KNEE: Primary | ICD-10-CM

## 2020-09-25 DIAGNOSIS — M17.11 PRIMARY OSTEOARTHRITIS OF RIGHT KNEE: ICD-10-CM

## 2020-09-25 DIAGNOSIS — M71.21 SYNOVIAL CYST OF RIGHT POPLITEAL SPACE: ICD-10-CM

## 2020-09-25 PROCEDURE — 99213 OFFICE O/P EST LOW 20 MIN: CPT | Performed by: NURSE PRACTITIONER

## 2020-09-25 PROCEDURE — 20610 DRAIN/INJ JOINT/BURSA W/O US: CPT | Performed by: NURSE PRACTITIONER

## 2020-09-25 RX ORDER — LIDOCAINE HYDROCHLORIDE 10 MG/ML
2 INJECTION, SOLUTION INFILTRATION; PERINEURAL
Status: COMPLETED | OUTPATIENT
Start: 2020-09-25 | End: 2020-09-25

## 2020-09-25 RX ORDER — TRIAMCINOLONE ACETONIDE 40 MG/ML
40 INJECTION, SUSPENSION INTRA-ARTICULAR; INTRAMUSCULAR
Status: COMPLETED | OUTPATIENT
Start: 2020-09-25 | End: 2020-09-25

## 2020-09-25 RX ADMIN — TRIAMCINOLONE ACETONIDE 40 MG: 40 INJECTION, SUSPENSION INTRA-ARTICULAR; INTRAMUSCULAR at 10:55

## 2020-09-25 RX ADMIN — LIDOCAINE HYDROCHLORIDE 2 ML: 10 INJECTION, SOLUTION INFILTRATION; PERINEURAL at 10:55

## 2020-09-25 NOTE — PROGRESS NOTES
"Ann Marie Malik is a 64 y.o. female returns for     Chief Complaint   Patient presents with   • Right Knee - Follow-up       HISTORY OF PRESENT ILLNESS: Patient presents to office for follow-up of chronic right knee pain related to osteoarthritis and degenerative changes in the lateral knee.  Per previous MRI imaging, patient also has a complex tear of the lateral meniscus and chondromalacia.  Patient requests an evaluation today for repeat injection to help with her pain.  Last injection was given on 6/8/2020, which offered her good pain improvement for a few months.  She states the pain is been gradually returning in recent weeks.  She denies any known injury.  Since last office visit, patient has had a CVA in June 2020 with no residual deficits.  She is currently taking Aleve per her PCP, which offer some mild improvement.  No new complaints or concerns noted in regards to her right knee joint.     CONCURRENT MEDICAL HISTORY:    The following portions of the patient's history were reviewed and updated as appropriate: allergies, current medications, past family history, past medical history, past social history, past surgical history and problem list.     ROS  No fevers or chills.  No chest pain or shortness of air.  No GI or  disturbances. Right knee pain.     PHYSICAL EXAMINATION:       Ht 170.2 cm (67\")   Wt 82.3 kg (181 lb 8 oz)   BMI 28.43 kg/m²     Physical Exam  Vitals signs reviewed.   Constitutional:       General: She is not in acute distress.     Appearance: She is well-developed. She is not ill-appearing.   HENT:      Head: Normocephalic.   Pulmonary:      Effort: Pulmonary effort is normal. No respiratory distress.   Abdominal:      General: There is no distension.      Palpations: Abdomen is soft.   Musculoskeletal:         General: Swelling (Mild, right knee) and tenderness (Mild, right knee) present.      Right knee: She exhibits effusion (Mild).      Left knee: She exhibits no effusion. "   Skin:     General: Skin is warm and dry.      Capillary Refill: Capillary refill takes less than 2 seconds.      Findings: No erythema.   Neurological:      Mental Status: She is alert and oriented to person, place, and time.      GCS: GCS eye subscore is 4. GCS verbal subscore is 5. GCS motor subscore is 6.   Psychiatric:         Speech: Speech normal.         Behavior: Behavior normal.         Thought Content: Thought content normal.         Judgment: Judgment normal.         GAIT:     []  Normal  [x]  Antalgic (mild)    Assistive device: [x]  None  []  Walker     []  Crutches  []  Cane     []  Wheelchair  []  Stretcher    Right Knee Exam     Tenderness   The patient is experiencing tenderness in the lateral joint line (Diffuse).    Range of Motion   Extension: 0   Flexion: 120     Tests   Nora:  Medial - negative Lateral - positive  Varus: negative Valgus: negative    Other   Erythema: absent  Sensation: normal  Pulse: present  Swelling: mild  Effusion: effusion (Mild) present    Comments:  Mild pain and limitations with range of motion.  Mild swelling/effusion note.  No erythema.  No warmth.  No signs of infection noted.       Left Knee Exam     Tenderness   The patient is experiencing no tenderness.     Range of Motion   Extension: 0   Flexion: 130     Tests   Nora:  Medial - negative Lateral - negative  Varus: negative Valgus: negative    Other   Erythema: absent  Sensation: normal  Pulse: present  Swelling: none  Effusion: no effusion present            MRI right knee without contrast     HISTORY: Right knee pain.           Prior exam: Right knee March 5, 2020.     TECHNIQUE: Multiplanar multisequence noncontrast images right  knee.     FINDINGS: Normal intact posterior cruciate ligament. Anterior  cruciate ligament is intact but demonstrates thickening  and  increased signal intensity, suggesting partial thickness  interstitial tear or more likely sequela of chronic synovial  inflammatory   changes.     Normal medial meniscus. Subtle oblique oriented tear posterior  horn lateral meniscus. Complex tear anterior horn lateral  meniscus. Normal patellar and quadriceps tendon. Normal patellar  articular hyaline cartilage.     Extensive abnormality lateral aspect tibiofemoral joint with loss  of cartilage, areas of grade 3 and grade IV chondromalacia distal  femur and proximal tibia lateral aspect.     Subtle bone edema distal femur and proximal tibia lateral aspect  either contusion or reactive stress changes.     Small effusion. Moderate size popliteal, Baker's cyst 4.47 x 1.35  x 1.55 cm in diameter.     IMPRESSION:     Subtle oblique oriented tear posterior horn lateral meniscus.  Complex tear anterior horn lateral meniscus.     Degenerative osteoarthritic changes lateral aspect right  tibiofemoral joint. There is joint space narrowing, extensive  areas of grade 3 and grade IV chondromalacia, osteochondral  defects distal femur and proximal tibia lateral aspect. There is  also bone edema distal femur proximal tibia either contusion or  more likely reactive stress changes.     Increased signal intensity involving the inferior surface of the  anterior cruciate ligament either partial thickness interstitial  tear more likely sequela of chronic synovial inflammatory  changes.     Small effusion. Popliteal, Baker's cyst.     MRI right knee is otherwise unremarkable.     Electronically signed by:  Andrew Ray MD  3/17/2020 2:04 PM CDT  Workstation: [id number]    Xr Knee 1 Or 2 View Right     Result Date: 3/5/2020  Narrative: Lateral view of the right knee reveals no evidence of acute fracture or dislocation.  No significant degenerative changes are noted.  Patellofemoral joint spacing is well-maintained.  There is a small suprapatellar joint effusion noted.  Soft tissues appear unremarkable.  No acute bony radiologic abnormalities are noted at this time.  No significant changes are noted when compared with prior  images from 1/22/2019.03/05/20 at 5:12 PM by LUIS Barrera      Xr Knee Bilateral Ap Standing     Result Date: 3/5/2020  Narrative: AP standing x-ray of bilateral knees reveals no evidence of acute fracture or dislocation.  There are degenerative changes noted bilaterally, more pronounced in the right knee.  There is moderate to severe loss of lateral compartmental joint spacing noted in the right knee, which is a new finding when compared with prior images from 1/22/2019.  Medial compartmental joint spacing in the right knee is maintained.  There is mild valgus positioning noted of the right knee.  There is mild loss of medial compartmental joint spacing noted in the left knee and mild varus positioning noted.  Soft tissues appear unremarkable. No acute bony radiologic abnormalities are noted at this time.03/05/20 at 5:11 PM by LUIS Barrera       ASSESSMENT:    Diagnoses and all orders for this visit:    Chronic pain of right knee  -     Large Joint Arthrocentesis: R knee    Primary osteoarthritis of right knee  -     Large Joint Arthrocentesis: R knee    Internal derangement of right knee  -     Large Joint Arthrocentesis: R knee    Complex tear of lateral meniscus of right knee as current injury, subsequent encounter  -     Large Joint Arthrocentesis: R knee    Synovial cyst of right popliteal space  -     Large Joint Arthrocentesis: R knee    Chondromalacia of right knee  -     Large Joint Arthrocentesis: R knee    Large Joint Arthrocentesis: R knee  Date/Time: 9/25/2020 10:55 AM  Consent given by: patient  Timeout: Immediately prior to procedure a time out was called to verify the correct patient, procedure, equipment, support staff and site/side marked as required   Supporting Documentation  Indications: pain, joint swelling and diagnostic evaluation   Procedure Details  Location: knee - R knee  Preparation: Patient was prepped and draped in the usual sterile fashion  Needle size: 22 G  Approach:  anterolateral  Medications administered: 2 mL lidocaine 1 %; 40 mg triamcinolone acetonide 40 MG/ML  Patient tolerance: patient tolerated the procedure well with no immediate complications      PLAN    Patient complains of increased/recurrent and chronic right knee pain.  This has been an ongoing issue since January 2019.  Previous MRI imaging was positive for degenerative osteoarthritic changes on the lateral compartment of the knee, extensive areas of chondromalacia, bony edema and a complex tear in the lateral meniscus.  Patient had good improvement with the previous intra-articular injection of steroid and wants to repeat this today.  Recommend a repeat intra-articular injection of steroid to the right knee today for management of joint pain/inflammation.  Patient wants to continue with conservative treatment efforts.  It is noted that she had a recent CVA in June 2020 so she would not be a good surgical candidate at this time for knee arthroplasty.  Patient is currently taking Aleve per her PCP for joint pain.  Patient can also take Tylenol as needed for additional pain control.  She was previously taking Celebrex but this is no longer on her medication list and has presumably been discontinued after her CVA.  Recommend rest and activity modification during times of increased pain.  Recommend elevation and ice therapy to the right knee as needed to minimize pain/swelling/inflammation.  Recommend modified weightbearing off the right knee with use of a cane as needed.  Continue with range of motion exercises and conditioning/strengthening of the right knee/leg.  Follow-up in 3 months for recheck as needed for any new, worsening or persistent symptoms.  Follow-up sooner as needed.    Patient's Body mass index is 28.43 kg/m². BMI is above normal parameters. Recommendations include: exercise counseling and nutrition counseling.    Return in about 3 months (around 12/25/2020), or if symptoms worsen or fail to  improve, for Recheck.      This document has been electronically signed by LUIS Barrera on September 25, 2020 13:48 CDT      LUIS Barrera

## 2020-10-09 NOTE — TELEPHONE ENCOUNTER
No Refill needed today, Patient has changed pharmacies and is now getting scripts at [facility] and not [facility]

## 2020-10-20 RX ORDER — VALSARTAN 160 MG/1
160 TABLET ORAL DAILY
Qty: 30 TABLET | Refills: 2 | Status: SHIPPED | OUTPATIENT
Start: 2020-10-20 | End: 2021-01-29 | Stop reason: SDUPTHER

## 2020-12-15 RX ORDER — ATORVASTATIN CALCIUM 40 MG/1
40 TABLET, FILM COATED ORAL DAILY
Qty: 90 TABLET | Refills: 1 | Status: SHIPPED | OUTPATIENT
Start: 2020-12-15 | End: 2021-06-08

## 2020-12-23 ENCOUNTER — OFFICE VISIT (OUTPATIENT)
Dept: ORTHOPEDIC SURGERY | Facility: CLINIC | Age: 65
End: 2020-12-23

## 2020-12-23 VITALS — BODY MASS INDEX: 29.35 KG/M2 | HEIGHT: 67 IN | WEIGHT: 187 LBS

## 2020-12-23 DIAGNOSIS — M71.21 SYNOVIAL CYST OF RIGHT POPLITEAL SPACE: ICD-10-CM

## 2020-12-23 DIAGNOSIS — M23.91 INTERNAL DERANGEMENT OF RIGHT KNEE: ICD-10-CM

## 2020-12-23 DIAGNOSIS — S83.271D COMPLEX TEAR OF LATERAL MENISCUS OF RIGHT KNEE AS CURRENT INJURY, SUBSEQUENT ENCOUNTER: ICD-10-CM

## 2020-12-23 DIAGNOSIS — G89.29 CHRONIC PAIN OF RIGHT KNEE: Primary | ICD-10-CM

## 2020-12-23 DIAGNOSIS — M25.561 CHRONIC PAIN OF RIGHT KNEE: Primary | ICD-10-CM

## 2020-12-23 DIAGNOSIS — M17.11 PRIMARY OSTEOARTHRITIS OF RIGHT KNEE: ICD-10-CM

## 2020-12-23 DIAGNOSIS — M94.261 CHONDROMALACIA OF RIGHT KNEE: ICD-10-CM

## 2020-12-23 PROCEDURE — 20610 DRAIN/INJ JOINT/BURSA W/O US: CPT | Performed by: NURSE PRACTITIONER

## 2020-12-23 PROCEDURE — 99214 OFFICE O/P EST MOD 30 MIN: CPT | Performed by: NURSE PRACTITIONER

## 2020-12-23 RX ORDER — CELECOXIB 200 MG/1
200 CAPSULE ORAL DAILY
Qty: 90 CAPSULE | Refills: 3 | Status: SHIPPED | OUTPATIENT
Start: 2020-12-23 | End: 2021-07-13

## 2020-12-23 RX ORDER — LIDOCAINE HYDROCHLORIDE 10 MG/ML
2 INJECTION, SOLUTION INFILTRATION; PERINEURAL
Status: COMPLETED | OUTPATIENT
Start: 2020-12-23 | End: 2020-12-23

## 2020-12-23 RX ORDER — TRIAMCINOLONE ACETONIDE 40 MG/ML
40 INJECTION, SUSPENSION INTRA-ARTICULAR; INTRAMUSCULAR
Status: COMPLETED | OUTPATIENT
Start: 2020-12-23 | End: 2020-12-23

## 2020-12-23 RX ADMIN — TRIAMCINOLONE ACETONIDE 40 MG: 40 INJECTION, SUSPENSION INTRA-ARTICULAR; INTRAMUSCULAR at 09:33

## 2020-12-23 RX ADMIN — LIDOCAINE HYDROCHLORIDE 2 ML: 10 INJECTION, SOLUTION INFILTRATION; PERINEURAL at 09:33

## 2020-12-23 NOTE — PROGRESS NOTES
"Ann Marie Malik is a 65 y.o. female returns for     Chief Complaint   Patient presents with   • Right Knee - Follow-up       HISTORY OF PRESENT ILLNESS: Patient presents to office for follow-up of chronic right knee pain related to osteoarthritis and degenerative changes in the lateral knee.  Per previous MRI imaging, patient also has a complex tear of the lateral meniscus and chondromalacia.  Patient reports increased right knee pain in the past few weeks with no known injury.  Patient requests an evaluation today for repeat injection to help with her pain.  Last injection was given on 9/25/2020, which offered her good pain improvement for about 9 to 10 weeks and then the pain began to gradually return.  No new complaints or concerns noted since last office visit.  Patient had previously stopped taking Celebrex following a CVA but request to return to taking Celebrex today as it did help with her joint pain.  She is not on anticoagulation therapy other than a baby aspirin daily.  Pain scale today is 5/10.     CONCURRENT MEDICAL HISTORY:    The following portions of the patient's history were reviewed and updated as appropriate: allergies, current medications, past family history, past medical history, past social history, past surgical history and problem list.     ROS  No fevers or chills.  No chest pain or shortness of air.  No GI or  disturbances. Right knee pain.     PHYSICAL EXAMINATION:       Ht 170.2 cm (67\")   Wt 84.8 kg (187 lb)   BMI 29.29 kg/m²     Physical Exam  Vitals signs reviewed.   Constitutional:       General: She is not in acute distress.     Appearance: She is well-developed. She is not ill-appearing.   HENT:      Head: Normocephalic.   Pulmonary:      Effort: Pulmonary effort is normal. No respiratory distress.   Abdominal:      General: There is no distension.      Palpations: Abdomen is soft.   Musculoskeletal:         General: Swelling (Mild, right knee) and tenderness (Right " knee) present. No deformity or signs of injury.      Right knee: She exhibits effusion (Mild).      Left knee: She exhibits no effusion.   Skin:     General: Skin is warm and dry.      Capillary Refill: Capillary refill takes less than 2 seconds.      Findings: No erythema.   Neurological:      Mental Status: She is alert and oriented to person, place, and time.      GCS: GCS eye subscore is 4. GCS verbal subscore is 5. GCS motor subscore is 6.   Psychiatric:         Speech: Speech normal.         Behavior: Behavior normal.         Thought Content: Thought content normal.         Judgment: Judgment normal.         GAIT:     []  Normal  [x]  Antalgic (mild)    Assistive device: [x]  None  []  Walker     []  Crutches  []  Cane     []  Wheelchair  []  Stretcher    Right Knee Exam     Tenderness   The patient is experiencing tenderness in the lateral joint line (Mild, diffuse).    Range of Motion   Extension: 0   Flexion: 120     Tests   Nora:  Medial - negative Lateral - positive  Varus: negative Valgus: negative    Other   Erythema: absent  Sensation: normal  Pulse: present  Swelling: mild  Effusion: effusion (Mild) present    Comments:  Mild pain and limitations with range of motion.  Mild swelling/effusion note.  No erythema.  No warmth.  No signs of infection noted.       Left Knee Exam     Tenderness   The patient is experiencing no tenderness.     Range of Motion   Extension: 0   Flexion: 130     Tests   Nora:  Medial - negative Lateral - negative  Varus: negative Valgus: negative    Other   Erythema: absent  Sensation: normal  Pulse: present  Swelling: none  Effusion: no effusion present            MRI right knee without contrast     HISTORY: Right knee pain.           Prior exam: Right knee March 5, 2020.     TECHNIQUE: Multiplanar multisequence noncontrast images right  knee.     FINDINGS: Normal intact posterior cruciate ligament. Anterior  cruciate ligament is intact but demonstrates thickening   and  increased signal intensity, suggesting partial thickness  interstitial tear or more likely sequela of chronic synovial  inflammatory  changes.     Normal medial meniscus. Subtle oblique oriented tear posterior  horn lateral meniscus. Complex tear anterior horn lateral  meniscus. Normal patellar and quadriceps tendon. Normal patellar  articular hyaline cartilage.     Extensive abnormality lateral aspect tibiofemoral joint with loss  of cartilage, areas of grade 3 and grade IV chondromalacia distal  femur and proximal tibia lateral aspect.     Subtle bone edema distal femur and proximal tibia lateral aspect  either contusion or reactive stress changes.     Small effusion. Moderate size popliteal, Baker's cyst 4.47 x 1.35  x 1.55 cm in diameter.     IMPRESSION:     Subtle oblique oriented tear posterior horn lateral meniscus.  Complex tear anterior horn lateral meniscus.     Degenerative osteoarthritic changes lateral aspect right  tibiofemoral joint. There is joint space narrowing, extensive  areas of grade 3 and grade IV chondromalacia, osteochondral  defects distal femur and proximal tibia lateral aspect. There is  also bone edema distal femur proximal tibia either contusion or  more likely reactive stress changes.     Increased signal intensity involving the inferior surface of the  anterior cruciate ligament either partial thickness interstitial  tear more likely sequela of chronic synovial inflammatory  changes.     Small effusion. Popliteal, Baker's cyst.     MRI right knee is otherwise unremarkable.     Electronically signed by:  Andrew Ray MD  3/17/2020 2:04 PM CDT  Workstation: [id number]     Xr Knee 1 Or 2 View Right     Result Date: 3/5/2020  Narrative: Lateral view of the right knee reveals no evidence of acute fracture or dislocation.  No significant degenerative changes are noted.  Patellofemoral joint spacing is well-maintained.  There is a small suprapatellar joint effusion noted.  Soft tissues  appear unremarkable.  No acute bony radiologic abnormalities are noted at this time.  No significant changes are noted when compared with prior images from 1/22/2019.03/05/20 at 5:12 PM by LUIS Barrera      Xr Knee Bilateral Ap Standing     Result Date: 3/5/2020  Narrative: AP standing x-ray of bilateral knees reveals no evidence of acute fracture or dislocation.  There are degenerative changes noted bilaterally, more pronounced in the right knee.  There is moderate to severe loss of lateral compartmental joint spacing noted in the right knee, which is a new finding when compared with prior images from 1/22/2019.  Medial compartmental joint spacing in the right knee is maintained.  There is mild valgus positioning noted of the right knee.  There is mild loss of medial compartmental joint spacing noted in the left knee and mild varus positioning noted.  Soft tissues appear unremarkable. No acute bony radiologic abnormalities are noted at this time.03/05/20 at 5:11 PM by LUIS Barrera       ASSESSMENT:    Diagnoses and all orders for this visit:    Chronic pain of right knee  -     Large Joint Arthrocentesis: R knee    Internal derangement of right knee  -     Large Joint Arthrocentesis: R knee    Primary osteoarthritis of right knee  -     Large Joint Arthrocentesis: R knee    Complex tear of lateral meniscus of right knee as current injury, subsequent encounter    Chondromalacia of right knee    Synovial cyst of right popliteal space    Other orders  -     celecoxib (CeleBREX) 200 MG capsule; Take 1 capsule by mouth Daily.      Large Joint Arthrocentesis: R knee  Date/Time: 12/23/2020 9:33 AM  Consent given by: patient  Timeout: Immediately prior to procedure a time out was called to verify the correct patient, procedure, equipment, support staff and site/side marked as required   Supporting Documentation  Indications: pain, joint swelling and diagnostic evaluation   Procedure Details  Location: knee -  R knee  Preparation: Patient was prepped and draped in the usual sterile fashion  Needle size: 22 G  Approach: anterolateral  Medications administered: 2 mL lidocaine 1 %; 40 mg triamcinolone acetonide 40 MG/ML  Patient tolerance: patient tolerated the procedure well with no immediate complications      PLAN    Patient complains of increased/recurrent and chronic right knee pain.  This has been an ongoing issue since January 2019.  Patient has advancing degenerative changes in the lateral compartment of the right knee as well as extensive areas of chondromalacia, previous bony edema and a complex tear of the lateral meniscus on MRI imaging.  She has gotten good pain improvement in the past with intra-articular injections of steroid and wants to repeat this today.  Recommend a repeat intra-articular injection of steroid to the right knee for management of joint pain/inflammation.  Patient indicates that she would be interested in surgical consult for knee arthroplasty in the next year or 2 but for now wants to continue with conservative treatment efforts.  Patient also request to go back on Celebrex, which she was taking previously and states it did help control her joint pain well.  Patient had a CVA in June 2020 that has not left her with any deficits.  She does take a baby aspirin daily but no other anticoagulation therapy.  She has been taking Aleve but states it is not as effective.  Celebrex is prescribed today for the patient to resume taking daily for consistent dosing of oral NSAIDs.  We discussed the increased risk for bleeding while taking aspirin and she is to monitor for any signs of increased bleeding such as easy bruising, blood in her stool/urine, dark tarry stools, nosebleeds, etc.  Patient is instructed to discontinue the Aleve and not to take any over-the-counter oral NSAIDs, such as Ibuprofen, while taking Celebrex.  Patient can also take Tylenol as needed for additional pain control.  Recommend  rest and activity modification during times of increased pain.  Recommend modified weightbearing of the right knee with use of a cane as needed during times of increased pain.  Recommend elevation and ice therapy to the right knee as needed to minimize pain/swelling/inflammation.  Continue with range of motion exercises and conditioning/strengthening of the right knee.  Follow-up in 3 months for recheck as needed for any new, worsening or persistent symptoms.    Body mass index is 29.29 kg/m².    Return in about 3 months (around 3/23/2021), or if symptoms worsen or fail to improve, for Recheck.      This document has been electronically signed by LUIS Barrera on December 23, 2020 15:14 CST      LUIS Barrera

## 2021-01-27 DIAGNOSIS — I10 ESSENTIAL HYPERTENSION: ICD-10-CM

## 2021-01-27 DIAGNOSIS — K21.9 GASTROESOPHAGEAL REFLUX DISEASE, UNSPECIFIED WHETHER ESOPHAGITIS PRESENT: ICD-10-CM

## 2021-01-27 RX ORDER — OMEPRAZOLE 20 MG/1
20 CAPSULE, DELAYED RELEASE ORAL DAILY
Qty: 90 CAPSULE | Refills: 1 | Status: SHIPPED | OUTPATIENT
Start: 2021-01-27 | End: 2021-07-26

## 2021-01-27 RX ORDER — HYDROCHLOROTHIAZIDE 25 MG/1
25 TABLET ORAL DAILY
Qty: 90 TABLET | Refills: 1 | Status: SHIPPED | OUTPATIENT
Start: 2021-01-27 | End: 2021-07-26

## 2021-01-29 RX ORDER — VALSARTAN 160 MG/1
160 TABLET ORAL DAILY
Qty: 90 TABLET | Refills: 2 | Status: SHIPPED | OUTPATIENT
Start: 2021-01-29 | End: 2021-02-24 | Stop reason: SDUPTHER

## 2021-02-24 ENCOUNTER — OFFICE VISIT (OUTPATIENT)
Dept: FAMILY MEDICINE CLINIC | Facility: CLINIC | Age: 66
End: 2021-02-24

## 2021-02-24 ENCOUNTER — LAB (OUTPATIENT)
Dept: LAB | Facility: HOSPITAL | Age: 66
End: 2021-02-24

## 2021-02-24 ENCOUNTER — TELEPHONE (OUTPATIENT)
Dept: FAMILY MEDICINE CLINIC | Facility: CLINIC | Age: 66
End: 2021-02-24

## 2021-02-24 VITALS
WEIGHT: 186.9 LBS | DIASTOLIC BLOOD PRESSURE: 80 MMHG | SYSTOLIC BLOOD PRESSURE: 112 MMHG | OXYGEN SATURATION: 96 % | BODY MASS INDEX: 29.34 KG/M2 | HEART RATE: 75 BPM | HEIGHT: 67 IN

## 2021-02-24 DIAGNOSIS — R74.8 ELEVATED ALKALINE PHOSPHATASE LEVEL: Primary | ICD-10-CM

## 2021-02-24 DIAGNOSIS — I10 ESSENTIAL HYPERTENSION: ICD-10-CM

## 2021-02-24 DIAGNOSIS — R74.8 ELEVATED SERUM GGT LEVEL: ICD-10-CM

## 2021-02-24 DIAGNOSIS — Z00.00 MEDICARE ANNUAL WELLNESS VISIT, SUBSEQUENT: Primary | ICD-10-CM

## 2021-02-24 DIAGNOSIS — R74.8 ELEVATED ALKALINE PHOSPHATASE LEVEL: ICD-10-CM

## 2021-02-24 DIAGNOSIS — Z86.73 HISTORY OF CVA (CEREBROVASCULAR ACCIDENT): ICD-10-CM

## 2021-02-24 DIAGNOSIS — E78.2 MIXED HYPERLIPIDEMIA: ICD-10-CM

## 2021-02-24 DIAGNOSIS — K21.9 GASTROESOPHAGEAL REFLUX DISEASE, UNSPECIFIED WHETHER ESOPHAGITIS PRESENT: ICD-10-CM

## 2021-02-24 LAB
ALBUMIN SERPL-MCNC: 4.9 G/DL (ref 3.5–5.2)
ALBUMIN/GLOB SERPL: 2 G/DL
ALP SERPL-CCNC: 217 U/L (ref 39–117)
ALT SERPL W P-5'-P-CCNC: 27 U/L (ref 1–33)
ANION GAP SERPL CALCULATED.3IONS-SCNC: 12 MMOL/L (ref 5–15)
AST SERPL-CCNC: 28 U/L (ref 1–32)
BILIRUB SERPL-MCNC: 0.6 MG/DL (ref 0–1.2)
BUN SERPL-MCNC: 20 MG/DL (ref 8–23)
BUN/CREAT SERPL: 24.7 (ref 7–25)
CALCIUM SPEC-SCNC: 10.4 MG/DL (ref 8.6–10.5)
CHLORIDE SERPL-SCNC: 101 MMOL/L (ref 98–107)
CO2 SERPL-SCNC: 27 MMOL/L (ref 22–29)
CREAT SERPL-MCNC: 0.81 MG/DL (ref 0.57–1)
GFR SERPL CREATININE-BSD FRML MDRD: 71 ML/MIN/1.73
GGT SERPL-CCNC: 124 U/L (ref 5–36)
GLOBULIN UR ELPH-MCNC: 2.5 GM/DL
GLUCOSE SERPL-MCNC: 102 MG/DL (ref 65–99)
POTASSIUM SERPL-SCNC: 4.4 MMOL/L (ref 3.5–5.2)
PROT SERPL-MCNC: 7.4 G/DL (ref 6–8.5)
SODIUM SERPL-SCNC: 140 MMOL/L (ref 136–145)

## 2021-02-24 PROCEDURE — 36415 COLL VENOUS BLD VENIPUNCTURE: CPT

## 2021-02-24 PROCEDURE — 82977 ASSAY OF GGT: CPT

## 2021-02-24 PROCEDURE — 99213 OFFICE O/P EST LOW 20 MIN: CPT | Performed by: FAMILY MEDICINE

## 2021-02-24 PROCEDURE — G0439 PPPS, SUBSEQ VISIT: HCPCS | Performed by: FAMILY MEDICINE

## 2021-02-24 PROCEDURE — 80053 COMPREHEN METABOLIC PANEL: CPT

## 2021-02-24 RX ORDER — VALSARTAN 160 MG/1
160 TABLET ORAL DAILY
Qty: 90 TABLET | Refills: 3 | Status: SHIPPED | OUTPATIENT
Start: 2021-02-24 | End: 2021-03-02 | Stop reason: SDUPTHER

## 2021-03-02 RX ORDER — VALSARTAN 160 MG/1
160 TABLET ORAL DAILY
Qty: 90 TABLET | Refills: 3 | Status: SHIPPED | OUTPATIENT
Start: 2021-03-02 | End: 2022-03-14 | Stop reason: SDUPTHER

## 2021-03-08 NOTE — TELEPHONE ENCOUNTER
Please call let patient know that CMP done showed elevated alkaline phosphatase.  This is one of the liver enzymes.  Her other liver enzymes were normal.  Added a test called GGT (gamma-glutamyl transpeptidase) which helps differentiate whether this elevation is coming from the liver or the bones.  Her GGT was elevated, which means we need to check out her liver with an ultrasound to make sure it is doing okay.  Liver ultrasound placed today.  Please let me know if there are any additional questions.  Will call patient with results of ultrasound once it returns.  Thanks, KEVAN He

## 2021-03-08 NOTE — TELEPHONE ENCOUNTER
Per Dr. He, Ms. Malik has been called with recent lab results and recommendations.   Continue current medications and follow-up as planned or sooner if any problems.   I told her that the hospital will be contacting her to schedule the Ultrasound of the Liver

## 2021-03-25 ENCOUNTER — HOSPITAL ENCOUNTER (OUTPATIENT)
Dept: ULTRASOUND IMAGING | Facility: HOSPITAL | Age: 66
Discharge: HOME | End: 2021-03-25
Admitting: FAMILY MEDICINE

## 2021-03-25 DIAGNOSIS — R74.8 ELEVATED ALKALINE PHOSPHATASE LEVEL: ICD-10-CM

## 2021-03-25 DIAGNOSIS — R74.8 ELEVATED SERUM GGT LEVEL: ICD-10-CM

## 2021-03-25 PROCEDURE — 76705 ECHO EXAM OF ABDOMEN: CPT

## 2021-03-30 ENCOUNTER — TELEPHONE (OUTPATIENT)
Dept: FAMILY MEDICINE CLINIC | Facility: CLINIC | Age: 66
End: 2021-03-30

## 2021-03-30 DIAGNOSIS — K76.0 FATTY INFILTRATION OF LIVER: ICD-10-CM

## 2021-03-30 DIAGNOSIS — R16.0 HEPATOMEGALY: Primary | ICD-10-CM

## 2021-03-30 NOTE — TELEPHONE ENCOUNTER
Spoke with patient.  Let her know that liver ultrasound showed hepatomegaly and changes consistent with fatty liver.  Information about this sent to her MyChart.  Referral placed to [facility].  KEVAN Chapman

## 2021-03-30 NOTE — TELEPHONE ENCOUNTER
Pt has US on her liver Thursday of last week and is wanting the results.    Please contact pt    [phone]

## 2021-03-31 ENCOUNTER — TELEPHONE (OUTPATIENT)
Dept: FAMILY MEDICINE CLINIC | Facility: CLINIC | Age: 66
End: 2021-03-31

## 2021-04-30 ENCOUNTER — TRANSCRIBE ORDERS (OUTPATIENT)
Dept: LAB | Facility: HOSPITAL | Age: 66
End: 2021-04-30

## 2021-04-30 ENCOUNTER — LAB (OUTPATIENT)
Dept: LAB | Facility: HOSPITAL | Age: 66
End: 2021-04-30

## 2021-04-30 DIAGNOSIS — K75.81 NASH (NONALCOHOLIC STEATOHEPATITIS): ICD-10-CM

## 2021-04-30 DIAGNOSIS — K75.81 NASH (NONALCOHOLIC STEATOHEPATITIS): Primary | ICD-10-CM

## 2021-04-30 LAB
CERULOPLASMIN SERPL-MCNC: 24 MG/DL (ref 19–39)
FERRITIN SERPL-MCNC: 222 NG/ML (ref 13–150)
HAV IGM SERPL QL IA: NORMAL
HBV CORE IGM SERPL QL IA: NORMAL
HBV SURFACE AG SERPL QL IA: NORMAL
HCV AB SER DONR QL: NORMAL
IRON 24H UR-MRATE: 146 MCG/DL (ref 37–145)
IRON SATN MFR SERPL: 44 % (ref 20–50)
TIBC SERPL-MCNC: 335 MCG/DL (ref 298–536)
TRANSFERRIN SERPL-MCNC: 225 MG/DL (ref 200–360)

## 2021-04-30 PROCEDURE — 83516 IMMUNOASSAY NONANTIBODY: CPT

## 2021-04-30 PROCEDURE — 82977 ASSAY OF GGT: CPT

## 2021-04-30 PROCEDURE — 86235 NUCLEAR ANTIGEN ANTIBODY: CPT

## 2021-04-30 PROCEDURE — 82104 ALPHA-1-ANTITRYPSIN PHENO: CPT

## 2021-04-30 PROCEDURE — 82172 ASSAY OF APOLIPOPROTEIN: CPT

## 2021-04-30 PROCEDURE — 82103 ALPHA-1-ANTITRYPSIN TOTAL: CPT

## 2021-04-30 PROCEDURE — 82390 ASSAY OF CERULOPLASMIN: CPT

## 2021-04-30 PROCEDURE — 80074 ACUTE HEPATITIS PANEL: CPT

## 2021-04-30 PROCEDURE — 86225 DNA ANTIBODY NATIVE: CPT

## 2021-04-30 PROCEDURE — 84450 TRANSFERASE (AST) (SGOT): CPT

## 2021-04-30 PROCEDURE — 82465 ASSAY BLD/SERUM CHOLESTEROL: CPT

## 2021-04-30 PROCEDURE — 84460 ALANINE AMINO (ALT) (SGPT): CPT

## 2021-04-30 PROCEDURE — 83883 ASSAY NEPHELOMETRY NOT SPEC: CPT

## 2021-04-30 PROCEDURE — 83010 ASSAY OF HAPTOGLOBIN QUANT: CPT

## 2021-04-30 PROCEDURE — 83540 ASSAY OF IRON: CPT

## 2021-04-30 PROCEDURE — 84478 ASSAY OF TRIGLYCERIDES: CPT

## 2021-04-30 PROCEDURE — 82247 BILIRUBIN TOTAL: CPT

## 2021-04-30 PROCEDURE — 82728 ASSAY OF FERRITIN: CPT

## 2021-04-30 PROCEDURE — 82947 ASSAY GLUCOSE BLOOD QUANT: CPT

## 2021-04-30 PROCEDURE — 36415 COLL VENOUS BLD VENIPUNCTURE: CPT

## 2021-04-30 PROCEDURE — 84466 ASSAY OF TRANSFERRIN: CPT

## 2021-05-01 LAB
ACTIN IGG SERPL-ACNC: 3 UNITS (ref 0–19)
CENTROMERE B AB SER-ACNC: <0.2 AI (ref 0–0.9)
CHROMATIN AB SERPL-ACNC: <0.2 AI (ref 0–0.9)
DSDNA AB SER-ACNC: <1 IU/ML (ref 0–9)
ENA JO1 AB SER-ACNC: <0.2 AI (ref 0–0.9)
ENA RNP AB SER-ACNC: 0.2 AI (ref 0–0.9)
ENA SCL70 AB SER-ACNC: <0.2 AI (ref 0–0.9)
ENA SM AB SER-ACNC: <0.2 AI (ref 0–0.9)
ENA SS-A AB SER-ACNC: <0.2 AI (ref 0–0.9)
ENA SS-B AB SER-ACNC: <0.2 AI (ref 0–0.9)
Lab: NORMAL
MITOCHONDRIA M2 IGG SER-ACNC: <20 UNITS (ref 0–20)

## 2021-05-04 DIAGNOSIS — M25.561 CHRONIC PAIN OF RIGHT KNEE: Primary | ICD-10-CM

## 2021-05-04 DIAGNOSIS — G89.29 CHRONIC PAIN OF RIGHT KNEE: Primary | ICD-10-CM

## 2021-05-04 LAB
A2 MACROGLOB SERPL-MCNC: 183 MG/DL (ref 110–276)
ALT SERPL W P-5'-P-CCNC: 37 IU/L (ref 0–40)
APO A-I SERPL-MCNC: 191 MG/DL (ref 116–209)
AST SERPL W P-5'-P-CCNC: 37 IU/L (ref 0–40)
BILIRUB SERPL-MCNC: 0.3 MG/DL (ref 0–1.2)
CHOLEST SERPL-MCNC: 185 MG/DL (ref 100–199)
FIBROSIS SCORING:: ABNORMAL
FIBROSIS STAGE SERPL QL: ABNORMAL
GGT SERPL-CCNC: 123 IU/L (ref 0–60)
GLUCOSE SERPL-MCNC: 92 MG/DL (ref 65–99)
HAPTOGLOB SERPL-MCNC: 139 MG/DL (ref 37–355)
INTERPRETATIONS: (REFERENCE): ABNORMAL
LABORATORY COMMENT REPORT: ABNORMAL
LIVER FIBR SCORE SERPL CALC.FIBROSURE: 0.17 (ref 0–0.21)
NASH SCORING (REFERENCE): ABNORMAL
NECROINFLAMMATORY ACT GRADE SERPL QL: ABNORMAL
NECROINFLAMMATORY ACT SCORE SERPL: 0.75
SERVICE CMNT-IMP: ABNORMAL
STEATOSIS GRADE (REFERENCE): ABNORMAL
STEATOSIS GRADING (REFERENCE): ABNORMAL
STEATOSIS SCORE (REFERENCE): 0.79 (ref 0–0.3)
TRIGL SERPL-MCNC: 387 MG/DL (ref 0–149)

## 2021-05-05 ENCOUNTER — OFFICE VISIT (OUTPATIENT)
Dept: ORTHOPEDIC SURGERY | Facility: CLINIC | Age: 66
End: 2021-05-05

## 2021-05-05 VITALS — WEIGHT: 184 LBS | HEIGHT: 67 IN | BODY MASS INDEX: 28.88 KG/M2

## 2021-05-05 DIAGNOSIS — G89.29 CHRONIC PAIN OF RIGHT KNEE: Primary | ICD-10-CM

## 2021-05-05 DIAGNOSIS — M17.11 PRIMARY OSTEOARTHRITIS OF RIGHT KNEE: ICD-10-CM

## 2021-05-05 DIAGNOSIS — M25.561 CHRONIC PAIN OF RIGHT KNEE: Primary | ICD-10-CM

## 2021-05-05 DIAGNOSIS — M23.300 DEGENERATIVE TEAR OF LATERAL MENISCUS OF RIGHT KNEE: ICD-10-CM

## 2021-05-05 DIAGNOSIS — M71.21 SYNOVIAL CYST OF RIGHT POPLITEAL SPACE: ICD-10-CM

## 2021-05-05 DIAGNOSIS — M94.261 CHONDROMALACIA OF RIGHT KNEE: ICD-10-CM

## 2021-05-05 DIAGNOSIS — M23.91 INTERNAL DERANGEMENT OF RIGHT KNEE: ICD-10-CM

## 2021-05-05 PROCEDURE — 20610 DRAIN/INJ JOINT/BURSA W/O US: CPT | Performed by: NURSE PRACTITIONER

## 2021-05-05 PROCEDURE — 99213 OFFICE O/P EST LOW 20 MIN: CPT | Performed by: NURSE PRACTITIONER

## 2021-05-05 RX ORDER — LIDOCAINE HYDROCHLORIDE 10 MG/ML
2 INJECTION, SOLUTION INFILTRATION; PERINEURAL
Status: COMPLETED | OUTPATIENT
Start: 2021-05-05 | End: 2021-05-05

## 2021-05-05 RX ORDER — TRIAMCINOLONE ACETONIDE 40 MG/ML
40 INJECTION, SUSPENSION INTRA-ARTICULAR; INTRAMUSCULAR
Status: COMPLETED | OUTPATIENT
Start: 2021-05-05 | End: 2021-05-05

## 2021-05-05 RX ADMIN — TRIAMCINOLONE ACETONIDE 40 MG: 40 INJECTION, SUSPENSION INTRA-ARTICULAR; INTRAMUSCULAR at 08:28

## 2021-05-05 RX ADMIN — LIDOCAINE HYDROCHLORIDE 2 ML: 10 INJECTION, SOLUTION INFILTRATION; PERINEURAL at 08:28

## 2021-05-05 NOTE — PROGRESS NOTES
"Ann Marie Malik is a 65 y.o. female returns for     Chief Complaint   Patient presents with   • Right Knee - Follow-up, Pain       HISTORY OF PRESENT ILLNESS: Patient presents to office for follow-up of chronic right knee pain related to end-stage knee arthritis.  Onset of right knee pain occurred over 2 years ago with no known injury.  Patient has been treated conservatively with intra-articular injections of steroid and prescription oral NSAIDs.  Patient has gotten good pain improvement in the past with intra-articular injections of steroid.  Last injection was given on 12/23/2020, which offered her good pain improvement for a few months.  Patient continues to experience increased right knee pain with weightbearing activity.  She has worse pain in the lateral aspect of the right knee.  Patient continues to experience intermittent joint swelling.  Patient continues with Celebrex daily and is tolerating this medication well with no known adverse effects.  No new complaints or concerns noted since last office visit.  No falls or injuries reported since last office visit.  X-rays are repeated today.     CONCURRENT MEDICAL HISTORY:    The following portions of the patient's history were reviewed and updated as appropriate: allergies, current medications, past family history, past medical history, past social history, past surgical history and problem list.     ROS  No fevers or chills.  No chest pain or shortness of air.  No GI or  disturbances. Right knee pain.     PHYSICAL EXAMINATION:       Ht 170.2 cm (67\")   Wt 83.5 kg (184 lb)   BMI 28.82 kg/m²     Physical Exam  Vitals reviewed.   Constitutional:       General: She is not in acute distress.     Appearance: She is well-developed. She is not ill-appearing.   HENT:      Head: Normocephalic.   Pulmonary:      Effort: Pulmonary effort is normal. No respiratory distress.   Abdominal:      General: There is no distension.      Palpations: Abdomen is soft. "   Musculoskeletal:         General: Swelling (Mild, right knee) and tenderness (Mild, right knee) present. No deformity or signs of injury.      Right knee: Effusion (Mild) present.      Instability Tests: Lateral Nora test positive. Medial Nora test negative.      Left knee: No effusion.      Instability Tests: Medial Nora test negative and lateral Nora test negative.   Skin:     General: Skin is warm and dry.      Capillary Refill: Capillary refill takes less than 2 seconds.      Findings: No erythema.   Neurological:      Mental Status: She is alert and oriented to person, place, and time.      GCS: GCS eye subscore is 4. GCS verbal subscore is 5. GCS motor subscore is 6.   Psychiatric:         Speech: Speech normal.         Behavior: Behavior normal.         Thought Content: Thought content normal.         Judgment: Judgment normal.         GAIT:     []  Normal  [x]  Antalgic    Assistive device: [x]  None  []  Walker     []  Crutches  []  Cane     []  Wheelchair  []  Stretcher    Right Knee Exam     Tenderness   The patient is experiencing tenderness in the lateral joint line (Mild, diffuse).    Range of Motion   Extension: 0   Flexion: 120     Tests   Nora:  Medial - negative Lateral - positive  Varus: negative Valgus: negative    Other   Erythema: absent  Sensation: normal  Pulse: present  Swelling: mild  Effusion: effusion (Mild) present    Comments:  Mild pain and limitations with range of motion. Mild swelling/effusion note.  No erythema.  No warmth.  No signs of infection noted.       Left Knee Exam     Tenderness   The patient is experiencing no tenderness.     Range of Motion   Extension: 0   Flexion: 130     Tests   Nora:  Medial - negative Lateral - negative  Varus: negative Valgus: negative    Other   Erythema: absent  Sensation: normal  Pulse: present  Swelling: none  Effusion: no effusion present              XR Knee 1 or 2 View Right    Result Date: 5/5/2021  Narrative:  Lateral view of the right knee reveals no evidence of acute fracture or dislocation.  No significant degenerative changes are noted.  Patellofemoral joint spacing appears well-maintained.  There is redemonstration of a small suprapatellar joint effusion.  Soft tissues appear unremarkable.  No acute bony radiologic abnormalities are noted at this time.  No acute interval changes are noted when compared with prior images from 3/5/2020.05/05/21 at 16:40 CDT by LUIS Barrera     XR Knee Bilateral AP Standing    Result Date: 5/5/2021  Narrative: AP standing view of the bilateral knees reveals no evidence of acute fracture or dislocation.  There are degenerative changes noted bilaterally, more pronounced in the right knee.  There is moderate to severe loss of lateral compartmental joint spacing noted in the right knee with subchondral sclerotic changes noted in the lateral aspects of the distal femur and proximal tibia. There is mild valgus positioning noted of the right knee.  There is mild loss of medial compartmental joint spacing noted in the left knee with mild varus positioning.  Soft tissues appear unremarkable.  No acute bony radiologic abnormalities are noted at this time.  No acute interval changes are noted when compared with prior images from 3/5/2020.05/05/21 at 16:39 CDT by LUIS Barrera    MRI right knee without contrast     HISTORY: Right knee pain.           Prior exam: Right knee March 5, 2020.     TECHNIQUE: Multiplanar multisequence noncontrast images right  knee.     FINDINGS: Normal intact posterior cruciate ligament. Anterior  cruciate ligament is intact but demonstrates thickening  and  increased signal intensity, suggesting partial thickness  interstitial tear or more likely sequela of chronic synovial  inflammatory  changes.     Normal medial meniscus. Subtle oblique oriented tear posterior  horn lateral meniscus. Complex tear anterior horn lateral  meniscus. Normal patellar and  quadriceps tendon. Normal patellar  articular hyaline cartilage.     Extensive abnormality lateral aspect tibiofemoral joint with loss  of cartilage, areas of grade 3 and grade IV chondromalacia distal  femur and proximal tibia lateral aspect.     Subtle bone edema distal femur and proximal tibia lateral aspect  either contusion or reactive stress changes.     Small effusion. Moderate size popliteal, Baker's cyst 4.47 x 1.35  x 1.55 cm in diameter.     IMPRESSION:     Subtle oblique oriented tear posterior horn lateral meniscus.  Complex tear anterior horn lateral meniscus.     Degenerative osteoarthritic changes lateral aspect right  tibiofemoral joint. There is joint space narrowing, extensive  areas of grade 3 and grade IV chondromalacia, osteochondral  defects distal femur and proximal tibia lateral aspect. There is  also bone edema distal femur proximal tibia either contusion or  more likely reactive stress changes.     Increased signal intensity involving the inferior surface of the  anterior cruciate ligament either partial thickness interstitial  tear more likely sequela of chronic synovial inflammatory  changes.     Small effusion. Popliteal, Baker's cyst.     MRI right knee is otherwise unremarkable.     Electronically signed by:  Andrew Ray MD  3/17/2020 2:04 PM CDT  Workstation: [id number]    Large Joint Arthrocentesis: R knee  Date/Time: 5/5/2021 8:28 AM  Consent given by: patient  Timeout: Immediately prior to procedure a time out was called to verify the correct patient, procedure, equipment, support staff and site/side marked as required   Supporting Documentation  Indications: pain, joint swelling and diagnostic evaluation   Procedure Details  Location: knee - R knee  Preparation: Patient was prepped and draped in the usual sterile fashion  Needle size: 22 G  Approach: anterolateral  Medications administered: 40 mg triamcinolone acetonide 40 MG/ML; 2 mL lidocaine 1 %  Patient tolerance: patient  tolerated the procedure well with no immediate complications            ASSESSMENT:    Diagnoses and all orders for this visit:    Chronic pain of right knee  -     Large Joint Arthrocentesis: R knee    Internal derangement of right knee  -     Large Joint Arthrocentesis: R knee    Primary osteoarthritis of right knee  -     Large Joint Arthrocentesis: R knee    Chondromalacia of right knee  -     Large Joint Arthrocentesis: R knee    Synovial cyst of right popliteal space  -     Large Joint Arthrocentesis: R knee    Degenerative tear of lateral meniscus of right knee  -     Large Joint Arthrocentesis: R knee    PLAN    AP standing x-ray of the bilateral knees with a lateral x-ray of the right knee repeated in office today reviewed with no acute findings noted.  No significant changes are noted when compared with prior images but the patient has end-stage osteoarthritic changes in the lateral aspect of her right knee with essentially bone-on-bone findings.  Patient indicates that she may be interested in right knee replacement in the near future, possibly in the fall.  For now, she wants to continue with conservative treatment efforts.  Patient typically gets good pain improvement with intra-articular injections of steroid and wants to proceed with a repeat injection today.  Recommend a repeat intra-articular injection of steroid to the right knee for management of joint pain/inflammation/swelling.  Recommend rest and activity modification during times of increased pain.  Recommend modified weightbearing of the right knee with use of a cane as needed.  We discussed that she can gradually progress her weightbearing and activity as pain and swelling allow.  Recommend elevation and ice therapy to the right knee as needed to minimize pain/swelling/inflammation.  Continue with range of motion exercises as tolerated and conditioning/strengthening exercises of the right knee/leg.  Recommend to continue with Celebrex daily  for consistent dosing of oral NSAIDs, which the patient states does help with her joint pain.  Patient is tolerating this medication well with no known adverse effects.  Patient can also take Tylenol as needed for additional pain control.  Follow-up in 3 months for recheck as needed for any new, worsening or persistent symptoms.  Patient can follow-up with one of the orthopedic surgeons at any time when she is ready to proceed with surgical discussion for right total knee arthroplasty.    EMR Dragon/Transciption Disclaimer: Some of this note may be an electronic transcription/translation of spoken language to printed text.  The electronic translation of spoken language may permit erroneous, or at times, nonsensical words or phrases to be inadvertently transcribed. Although I have reviewed the note for such errors, some may still exist.     Return in about 3 months (around 8/5/2021) for Recheck.        This document has been electronically signed by LUIS Barrera on May 5, 2021 21:25 CDT      LUIS Barrera

## 2021-05-06 LAB
A1AT PHENOTYP SERPL IFE: NORMAL
A1AT SERPL-MCNC: 111 MG/DL (ref 101–187)

## 2021-06-08 RX ORDER — ATORVASTATIN CALCIUM 40 MG/1
TABLET, FILM COATED ORAL
Qty: 90 TABLET | Refills: 0 | Status: SHIPPED | OUTPATIENT
Start: 2021-06-08 | End: 2021-08-18

## 2021-07-02 ENCOUNTER — LAB (OUTPATIENT)
Dept: LAB | Facility: HOSPITAL | Age: 66
End: 2021-07-02

## 2021-07-02 ENCOUNTER — TRANSCRIBE ORDERS (OUTPATIENT)
Dept: LAB | Facility: HOSPITAL | Age: 66
End: 2021-07-02

## 2021-07-02 DIAGNOSIS — K75.81 NONALCOHOLIC STEATOHEPATITIS: Primary | ICD-10-CM

## 2021-07-02 DIAGNOSIS — K75.81 NONALCOHOLIC STEATOHEPATITIS: ICD-10-CM

## 2021-07-02 LAB
ALBUMIN SERPL-MCNC: 4.6 G/DL (ref 3.5–5.2)
ALBUMIN/GLOB SERPL: 2.1 G/DL
ALP SERPL-CCNC: 151 U/L (ref 39–117)
ALT SERPL W P-5'-P-CCNC: 18 U/L (ref 1–33)
ANION GAP SERPL CALCULATED.3IONS-SCNC: 12.9 MMOL/L (ref 5–15)
AST SERPL-CCNC: 22 U/L (ref 1–32)
BILIRUB SERPL-MCNC: 0.5 MG/DL (ref 0–1.2)
BUN SERPL-MCNC: 15 MG/DL (ref 8–23)
BUN/CREAT SERPL: 16.5 (ref 7–25)
CALCIUM SPEC-SCNC: 9.6 MG/DL (ref 8.6–10.5)
CHLORIDE SERPL-SCNC: 101 MMOL/L (ref 98–107)
CO2 SERPL-SCNC: 25.1 MMOL/L (ref 22–29)
CREAT SERPL-MCNC: 0.91 MG/DL (ref 0.57–1)
GFR SERPL CREATININE-BSD FRML MDRD: 62 ML/MIN/1.73
GLOBULIN UR ELPH-MCNC: 2.2 GM/DL
GLUCOSE SERPL-MCNC: 90 MG/DL (ref 65–99)
POTASSIUM SERPL-SCNC: 3.6 MMOL/L (ref 3.5–5.2)
PROT SERPL-MCNC: 6.8 G/DL (ref 6–8.5)
SODIUM SERPL-SCNC: 139 MMOL/L (ref 136–145)

## 2021-07-02 PROCEDURE — 36415 COLL VENOUS BLD VENIPUNCTURE: CPT

## 2021-07-02 PROCEDURE — 80053 COMPREHEN METABOLIC PANEL: CPT

## 2021-07-13 ENCOUNTER — OFFICE VISIT (OUTPATIENT)
Dept: ORTHOPEDIC SURGERY | Facility: CLINIC | Age: 66
End: 2021-07-13

## 2021-07-13 VITALS
OXYGEN SATURATION: 96 % | BODY MASS INDEX: 28.09 KG/M2 | DIASTOLIC BLOOD PRESSURE: 82 MMHG | SYSTOLIC BLOOD PRESSURE: 124 MMHG | HEART RATE: 66 BPM | WEIGHT: 179 LBS | HEIGHT: 67 IN

## 2021-07-13 DIAGNOSIS — M23.91 INTERNAL DERANGEMENT OF RIGHT KNEE: ICD-10-CM

## 2021-07-13 DIAGNOSIS — M94.261 CHONDROMALACIA OF RIGHT KNEE: ICD-10-CM

## 2021-07-13 DIAGNOSIS — G89.29 CHRONIC PAIN OF RIGHT KNEE: ICD-10-CM

## 2021-07-13 DIAGNOSIS — M71.21 SYNOVIAL CYST OF RIGHT POPLITEAL SPACE: ICD-10-CM

## 2021-07-13 DIAGNOSIS — M17.11 PRIMARY OSTEOARTHRITIS OF RIGHT KNEE: Primary | ICD-10-CM

## 2021-07-13 DIAGNOSIS — M25.561 CHRONIC PAIN OF RIGHT KNEE: ICD-10-CM

## 2021-07-13 DIAGNOSIS — S83.271D COMPLEX TEAR OF LATERAL MENISCUS OF RIGHT KNEE AS CURRENT INJURY, SUBSEQUENT ENCOUNTER: ICD-10-CM

## 2021-07-13 PROBLEM — M23.300 DEGENERATIVE TEAR OF LATERAL MENISCUS OF RIGHT KNEE: Status: ACTIVE | Noted: 2020-03-23

## 2021-07-13 PROCEDURE — 99214 OFFICE O/P EST MOD 30 MIN: CPT | Performed by: ORTHOPAEDIC SURGERY

## 2021-07-13 NOTE — PROGRESS NOTES
Ann Marie Malik is a 65 y.o. female returns for     Chief Complaint   Patient presents with   • Right Knee - Follow-up, Pain       HISTORY OF PRESENT ILLNESS:  F/u right knee pain. Patient previously seen by Yelitza Arteaga, steroid injection done on 5/5/2021.   Patient has had intra-articular steroid injections, viscosupplementation, use of anti-inflammatory medications, use of a cane, home exercise program, as well as activity modification.  She reports pain with activities of daily living.  She is unhappy with her quality of life and wishes to discuss definitive treatment options.       CONCURRENT MEDICAL HISTORY:    Past Medical History:   Diagnosis Date   • GERD (gastroesophageal reflux disease)    • Hyperlipidemia    • Hypertension    • Primary osteoarthritis of right knee 3/8/2020       Allergies   Allergen Reactions   • Adhesive Tape Other (See Comments)     Pulls skin off   • Wound Dressing Adhesive Rash       Current Outpatient Medications on File Prior to Visit   Medication Sig   • acetaminophen (TYLENOL) 500 MG tablet Take 500 mg by mouth Every 6 (Six) Hours As Needed for Mild Pain .   • aspirin 81 MG chewable tablet Chew 1 tablet Daily.   • atorvastatin (LIPITOR) 40 MG tablet Take 1 tablet by mouth once daily   • hydroCHLOROthiazide (HYDRODIURIL) 25 MG tablet Take 1 tablet by mouth Daily.   • MILK THISTLE PO Take  by mouth.   • Multiple Vitamins-Minerals (CENTRUM SILVER 50+WOMEN PO) Take  by mouth.   • Naproxen Sodium (ALEVE PO) Take  by mouth.   • omeprazole (priLOSEC) 20 MG capsule Take 1 capsule by mouth Daily.   • valsartan (Diovan) 160 MG tablet Take 1 tablet by mouth Daily.   • VITAMIN E PO Take  by mouth.     No current facility-administered medications on file prior to visit.       Past Surgical History:   Procedure Laterality Date   • TUBAL ABDOMINAL LIGATION  1980       Family History   Problem Relation Age of Onset   • Heart disease Other    • Hypertension Other    • Cancer Other    • Heart  "attack Mother    • Hypertension Mother    • Hyperlipidemia Mother    • Obesity Mother    • Cancer Father    • Hypertension Father    • Hyperlipidemia Father    • Obesity Father    • Cancer Sister        Social History     Socioeconomic History   • Marital status:      Spouse name: Not on file   • Number of children: Not on file   • Years of education: Not on file   • Highest education level: Not on file   Tobacco Use   • Smoking status: Former Smoker     Packs/day: 1.00     Years: 25.00     Pack years: 25.00     Types: Cigarettes     Quit date:      Years since quittin.5   • Smokeless tobacco: Never Used   Substance and Sexual Activity   • Alcohol use: No   • Drug use: Never           ROS  No fevers or chills.  No chest pain or shortness of air.  No GI or  disturbances.  Other than right knee pain, all other systems reviewed as negative.    PHYSICAL EXAMINATION:       /82   Pulse 66   Ht 170.2 cm (67\")   Wt 81.2 kg (179 lb)   SpO2 96%   BMI 28.04 kg/m²     Physical Exam  Vitals reviewed.   Constitutional:       General: She is not in acute distress.     Appearance: Normal appearance. She is well-developed.   Cardiovascular:      Rate and Rhythm: Normal rate and regular rhythm.      Heart sounds: Normal heart sounds.   Pulmonary:      Effort: Pulmonary effort is normal. No respiratory distress.      Breath sounds: Normal breath sounds.   Abdominal:      General: Bowel sounds are normal.      Palpations: Abdomen is soft.   Neurological:      Mental Status: She is alert and oriented to person, place, and time.   Psychiatric:         Behavior: Behavior normal.         Thought Content: Thought content normal.         Judgment: Judgment normal.         GAIT:     []  Normal  []  Antalgic    Assistive device: []  None  []  Walker     []  Crutches  []  Cane     []  Wheelchair  []  Stretcher    Right Knee Exam     Muscle Strength   The patient has normal right knee strength.    Tenderness   Right " knee tenderness location: diffuse.    Range of Motion   Extension: -5   Flexion: 110     Tests   Varus: negative Valgus: negative  Drawer:  Anterior - negative    Posterior - negative    Other   Sensation: normal  Pulse: present  Swelling: mild    Comments:  Crepitation on motion.  Mild to moderate pain through arc of motion  Mild valgus deformity                  Narrative & Impression   Lateral view of the right knee reveals no evidence of acute fracture or dislocation.  No significant degenerative changes are noted.  Patellofemoral joint spacing appears well-maintained.  There is redemonstration of a small suprapatellar joint effusion.  Soft tissues appear unremarkable.  No acute bony radiologic abnormalities are noted at this time.  No acute interval changes are noted when compared with prior images from 3/5/2020.05/05/21 at 16:40 CDT by LUIS Barrera         Narrative & Impression   AP standing view of the bilateral knees reveals no evidence of acute fracture or dislocation.  There are degenerative changes noted bilaterally, more pronounced in the right knee.  There is moderate to severe loss of lateral compartmental joint spacing noted in the right knee with subchondral sclerotic changes noted in the lateral aspects of the distal femur and proximal tibia. There is mild valgus positioning noted of the right knee.  There is mild loss of medial compartmental joint spacing noted in the left knee with mild varus positioning.  Soft tissues appear unremarkable.  No acute bony radiologic abnormalities are noted at this time.  No acute interval changes are noted when compared with prior images from 3/5/2020.05/05/21 at 16:39 CDT by LUIS Barrera       ASSESSMENT:    Diagnoses and all orders for this visit:    Primary osteoarthritis of right knee  -     Case Request; Standing  -     MRSA Screen, PCR (Inpatient) - Swab, Nares; Future  -     Type and screen; Future  -     Tranexamic Acid 1,000 mg in sodium  chloride 0.9 % 100 mL  -     Tranexamic Acid 1,000 mg in sodium chloride 0.9 % 100 mL  -     ceFAZolin (ANCEF) 2 g in sodium chloride 0.9 % 100 mL IVPB  -     acetaminophen (TYLENOL) tablet 975 mg  -     meloxicam (MOBIC) tablet 15 mg  -     pregabalin (LYRICA) capsule 75 mg  -     oxyCODONE (oxyCONTIN) 12 hr tablet 10 mg  -     Case Request    Chronic pain of right knee  -     Case Request; Standing  -     MRSA Screen, PCR (Inpatient) - Swab, Nares; Future  -     Type and screen; Future  -     Tranexamic Acid 1,000 mg in sodium chloride 0.9 % 100 mL  -     Tranexamic Acid 1,000 mg in sodium chloride 0.9 % 100 mL  -     ceFAZolin (ANCEF) 2 g in sodium chloride 0.9 % 100 mL IVPB  -     acetaminophen (TYLENOL) tablet 975 mg  -     meloxicam (MOBIC) tablet 15 mg  -     pregabalin (LYRICA) capsule 75 mg  -     oxyCODONE (oxyCONTIN) 12 hr tablet 10 mg  -     Case Request    Internal derangement of right knee  -     Case Request; Standing  -     MRSA Screen, PCR (Inpatient) - Swab, Nares; Future  -     Type and screen; Future  -     Tranexamic Acid 1,000 mg in sodium chloride 0.9 % 100 mL  -     Tranexamic Acid 1,000 mg in sodium chloride 0.9 % 100 mL  -     ceFAZolin (ANCEF) 2 g in sodium chloride 0.9 % 100 mL IVPB  -     acetaminophen (TYLENOL) tablet 975 mg  -     meloxicam (MOBIC) tablet 15 mg  -     pregabalin (LYRICA) capsule 75 mg  -     oxyCODONE (oxyCONTIN) 12 hr tablet 10 mg  -     Case Request    Chondromalacia of right knee  -     Case Request; Standing  -     MRSA Screen, PCR (Inpatient) - Swab, Nares; Future  -     Type and screen; Future  -     Tranexamic Acid 1,000 mg in sodium chloride 0.9 % 100 mL  -     Tranexamic Acid 1,000 mg in sodium chloride 0.9 % 100 mL  -     ceFAZolin (ANCEF) 2 g in sodium chloride 0.9 % 100 mL IVPB  -     acetaminophen (TYLENOL) tablet 975 mg  -     meloxicam (MOBIC) tablet 15 mg  -     pregabalin (LYRICA) capsule 75 mg  -     oxyCODONE (oxyCONTIN) 12 hr tablet 10 mg  -      Case Request    Synovial cyst of right popliteal space  -     Case Request; Standing  -     MRSA Screen, PCR (Inpatient) - Swab, Nares; Future  -     Type and screen; Future  -     Tranexamic Acid 1,000 mg in sodium chloride 0.9 % 100 mL  -     Tranexamic Acid 1,000 mg in sodium chloride 0.9 % 100 mL  -     ceFAZolin (ANCEF) 2 g in sodium chloride 0.9 % 100 mL IVPB  -     acetaminophen (TYLENOL) tablet 975 mg  -     meloxicam (MOBIC) tablet 15 mg  -     pregabalin (LYRICA) capsule 75 mg  -     oxyCODONE (oxyCONTIN) 12 hr tablet 10 mg  -     Case Request    Complex tear of lateral meniscus of right knee as current injury, subsequent encounter  -     Case Request; Standing  -     MRSA Screen, PCR (Inpatient) - Swab, Nares; Future  -     Type and screen; Future  -     Tranexamic Acid 1,000 mg in sodium chloride 0.9 % 100 mL  -     Tranexamic Acid 1,000 mg in sodium chloride 0.9 % 100 mL  -     ceFAZolin (ANCEF) 2 g in sodium chloride 0.9 % 100 mL IVPB  -     acetaminophen (TYLENOL) tablet 975 mg  -     meloxicam (MOBIC) tablet 15 mg  -     pregabalin (LYRICA) capsule 75 mg  -     oxyCODONE (oxyCONTIN) 12 hr tablet 10 mg  -     Case Request    Other orders  -     Naproxen Sodium (ALEVE PO); Take  by mouth.  -     Outpatient In A Bed; Standing  -     Follow Anesthesia Guidelines / Standing Orders; Future  -     Provide instructions to patient regarding NPO status  -     Chlorhexidine Skin Prep - Educate and Review With Patient; Future  -     Provide Patient With ERAS Hydration Instructions  -     Complete A PROMIS And HOOS Or KOOS Survey If Having Hip Or Knee Replacement  -     Follow Anesthesia Guidelines / Standing Orders; Standing  -     Nerve Block; Standing  -     Verify NPO Status; Standing  -     POC Glucose Once; Standing  -     Clip operative site; Standing  -     Obtain informed consent (if not collected inpatient or PAT); Standing          PLAN    Patient has tried multiple nonoperative management options for  osteoarthritis of the right knee.  She continues to have pain with activities of daily living.  She is unhappy with her quality of life and wishes to discuss definitive treatment options.    The patient voiced understanding of the risks, benefits, and alternative forms of treatment that were discussed and the patient consents to proceed with surgery.  All risks, benefits and alternatives were discussed.  Risks include, but not exclusive to anesthetic complications, including death, MI, CVA, infection, bleeding DVT, fracture, residual pain and need for future surgery.    This discussion was held with the patient by Chris Covington MD and all questions were answered.      And a right total knee arthroplasty with adductor canal block.    Return for Post-operative eval.    Chris Covington MD

## 2021-07-14 RX ORDER — BUPIVACAINE HCL/0.9 % NACL/PF 0.1 %
2 PLASTIC BAG, INJECTION (ML) EPIDURAL ONCE
Status: CANCELLED | OUTPATIENT
Start: 2021-08-23 | End: 2021-07-14

## 2021-07-14 RX ORDER — ACETAMINOPHEN 325 MG/1
1000 TABLET ORAL ONCE
Status: CANCELLED | OUTPATIENT
Start: 2021-08-23 | End: 2021-07-14

## 2021-07-14 RX ORDER — OXYCODONE HCL 10 MG/1
10 TABLET, FILM COATED, EXTENDED RELEASE ORAL ONCE
Status: CANCELLED | OUTPATIENT
Start: 2021-08-23 | End: 2021-07-14

## 2021-07-14 RX ORDER — PREGABALIN 75 MG/1
75 CAPSULE ORAL ONCE
Status: CANCELLED | OUTPATIENT
Start: 2021-08-23 | End: 2021-07-14

## 2021-07-14 RX ORDER — MELOXICAM 15 MG/1
15 TABLET ORAL ONCE
Status: CANCELLED | OUTPATIENT
Start: 2021-08-23 | End: 2021-07-14

## 2021-07-20 PROBLEM — S83.271A COMPLEX TEAR OF LATERAL MENISCUS OF RIGHT KNEE AS CURRENT INJURY: Status: ACTIVE | Noted: 2021-07-20

## 2021-07-24 DIAGNOSIS — K21.9 GASTROESOPHAGEAL REFLUX DISEASE, UNSPECIFIED WHETHER ESOPHAGITIS PRESENT: ICD-10-CM

## 2021-07-24 DIAGNOSIS — I10 ESSENTIAL HYPERTENSION: ICD-10-CM

## 2021-07-26 RX ORDER — HYDROCHLOROTHIAZIDE 25 MG/1
TABLET ORAL
Qty: 90 TABLET | Refills: 3 | Status: SHIPPED | OUTPATIENT
Start: 2021-07-26 | End: 2021-08-18

## 2021-07-26 RX ORDER — OMEPRAZOLE 20 MG/1
CAPSULE, DELAYED RELEASE ORAL
Qty: 90 CAPSULE | Refills: 3 | Status: SHIPPED | OUTPATIENT
Start: 2021-07-26 | End: 2021-08-18

## 2021-08-17 ENCOUNTER — OFFICE VISIT (OUTPATIENT)
Dept: ORTHOPEDIC SURGERY | Facility: CLINIC | Age: 66
End: 2021-08-17

## 2021-08-17 VITALS
HEART RATE: 71 BPM | BODY MASS INDEX: 28.25 KG/M2 | SYSTOLIC BLOOD PRESSURE: 132 MMHG | DIASTOLIC BLOOD PRESSURE: 88 MMHG | WEIGHT: 180 LBS | OXYGEN SATURATION: 97 % | HEIGHT: 67 IN

## 2021-08-17 DIAGNOSIS — G89.29 CHRONIC PAIN OF RIGHT KNEE: ICD-10-CM

## 2021-08-17 DIAGNOSIS — M17.11 PRIMARY OSTEOARTHRITIS OF RIGHT KNEE: Primary | ICD-10-CM

## 2021-08-17 DIAGNOSIS — M94.261 CHONDROMALACIA OF RIGHT KNEE: ICD-10-CM

## 2021-08-17 DIAGNOSIS — M23.91 INTERNAL DERANGEMENT OF RIGHT KNEE: ICD-10-CM

## 2021-08-17 DIAGNOSIS — M25.561 CHRONIC PAIN OF RIGHT KNEE: ICD-10-CM

## 2021-08-17 PROCEDURE — 99024 POSTOP FOLLOW-UP VISIT: CPT | Performed by: ORTHOPAEDIC SURGERY

## 2021-08-17 NOTE — H&P (VIEW-ONLY)
Ann Marie Malik is a 65 y.o. female returns for     Chief Complaint   Patient presents with   • Right Knee - Follow-up, Pre-op Exam       HISTORY OF PRESENT ILLNESS:  F/u right knee pain. Patient previously seen by Yelitza Arteaga, steroid injection done on 5/5/2021.   Patient has had intra-articular steroid injections, viscosupplementation, use of anti-inflammatory medications, use of a cane, home exercise program, as well as activity modification.  She reports pain with activities of daily living.  She is unhappy with her quality of life and wishes to discuss definitive treatment options.       CONCURRENT MEDICAL HISTORY:    Past Medical History:   Diagnosis Date   • GERD (gastroesophageal reflux disease)    • Hyperlipidemia    • Hypertension    • Primary osteoarthritis of right knee 3/8/2020       Allergies   Allergen Reactions   • Adhesive Tape Other (See Comments)     Pulls skin off   • Wound Dressing Adhesive Rash       Current Outpatient Medications on File Prior to Visit   Medication Sig   • acetaminophen (TYLENOL) 500 MG tablet Take 500 mg by mouth Every 6 (Six) Hours As Needed for Mild Pain .   • aspirin 81 MG chewable tablet Chew 1 tablet Daily.   • atorvastatin (LIPITOR) 40 MG tablet Take 1 tablet by mouth once daily   • hydroCHLOROthiazide (HYDRODIURIL) 25 MG tablet Take 1 tablet by mouth once daily   • MILK THISTLE PO Take  by mouth.   • Multiple Vitamins-Minerals (CENTRUM SILVER 50+WOMEN PO) Take  by mouth.   • Naproxen Sodium (ALEVE PO) Take  by mouth.   • omeprazole (priLOSEC) 20 MG capsule Take 1 capsule by mouth once daily   • valsartan (Diovan) 160 MG tablet Take 1 tablet by mouth Daily.   • VITAMIN E PO Take  by mouth.     No current facility-administered medications on file prior to visit.       Past Surgical History:   Procedure Laterality Date   • TUBAL ABDOMINAL LIGATION  1980       Family History   Problem Relation Age of Onset   • Heart disease Other    • Hypertension Other    • Cancer  "Other    • Heart attack Mother    • Hypertension Mother    • Hyperlipidemia Mother    • Obesity Mother    • Cancer Father    • Hypertension Father    • Hyperlipidemia Father    • Obesity Father    • Cancer Sister        Social History     Socioeconomic History   • Marital status:      Spouse name: Not on file   • Number of children: Not on file   • Years of education: Not on file   • Highest education level: Not on file   Tobacco Use   • Smoking status: Former Smoker     Packs/day: 1.00     Years: 25.00     Pack years: 25.00     Types: Cigarettes     Quit date:      Years since quittin.6   • Smokeless tobacco: Never Used   Substance and Sexual Activity   • Alcohol use: No   • Drug use: Never           ROS  No fevers or chills.  No chest pain or shortness of air.  No GI or  disturbances.  Other than right knee pain, all other systems reviewed as negative.    PHYSICAL EXAMINATION:       /88   Pulse 71   Ht 170.2 cm (67\")   Wt 81.6 kg (180 lb)   SpO2 97%   BMI 28.19 kg/m²     Physical Exam  Vitals reviewed.   Constitutional:       General: She is not in acute distress.     Appearance: Normal appearance. She is well-developed.   Cardiovascular:      Rate and Rhythm: Normal rate and regular rhythm.      Heart sounds: Normal heart sounds.   Pulmonary:      Effort: Pulmonary effort is normal. No respiratory distress.      Breath sounds: Normal breath sounds.   Abdominal:      General: Bowel sounds are normal.      Palpations: Abdomen is soft.   Neurological:      Mental Status: She is alert and oriented to person, place, and time.   Psychiatric:         Behavior: Behavior normal.         Thought Content: Thought content normal.         Judgment: Judgment normal.         GAIT:     []  Normal  [x]  Antalgic    Assistive device: [x]  None  []  Walker     []  Crutches  []  Cane     []  Wheelchair  []  Stretcher    Right Knee Exam     Muscle Strength   The patient has normal right knee " strength.    Tenderness   Right knee tenderness location: diffuse.    Range of Motion   Extension: -5   Flexion: 110     Tests   Varus: negative Valgus: negative  Drawer:  Anterior - negative    Posterior - negative    Other   Sensation: normal  Pulse: present  Swelling: mild    Comments:  Crepitation on motion.  Mild to moderate pain through arc of motion  Mild valgus deformity                  Narrative & Impression   Lateral view of the right knee reveals no evidence of acute fracture or dislocation.  No significant degenerative changes are noted.  Patellofemoral joint spacing appears well-maintained.  There is redemonstration of a small suprapatellar joint effusion.  Soft tissues appear unremarkable.  No acute bony radiologic abnormalities are noted at this time.  No acute interval changes are noted when compared with prior images from 3/5/2020.05/05/21 at 16:40 CDT by LUIS Barrera         Narrative & Impression   AP standing view of the bilateral knees reveals no evidence of acute fracture or dislocation.  There are degenerative changes noted bilaterally, more pronounced in the right knee.  There is moderate to severe loss of lateral compartmental joint spacing noted in the right knee with subchondral sclerotic changes noted in the lateral aspects of the distal femur and proximal tibia. There is mild valgus positioning noted of the right knee.  There is mild loss of medial compartmental joint spacing noted in the left knee with mild varus positioning.  Soft tissues appear unremarkable.  No acute bony radiologic abnormalities are noted at this time.  No acute interval changes are noted when compared with prior images from 3/5/2020.05/05/21 at 16:39 CDT by LUIS Barrera       ASSESSMENT:    Diagnoses and all orders for this visit:    Primary osteoarthritis of right knee    Chronic pain of right knee    Internal derangement of right knee    Chondromalacia of right knee          PLAN    Patient has  tried multiple nonoperative management options for osteoarthritis of the right knee.  She continues to have pain with activities of daily living.  She is unhappy with her quality of life and wishes to discuss definitive treatment options.    The patient voiced understanding of the risks, benefits, and alternative forms of treatment that were discussed and the patient consents to proceed with surgery.  All risks, benefits and alternatives were discussed.  Risks include, but not exclusive to anesthetic complications, including death, MI, CVA, infection, bleeding DVT, fracture, residual pain and need for future surgery.    This discussion was held with the patient by Chris Covington MD and all questions were answered.    PLAN:  right total knee arthroplasty with adductor canal block.    No changes in history/medications.    Return for Post-operative eval.    Chris Covington MD

## 2021-08-18 ENCOUNTER — APPOINTMENT (OUTPATIENT)
Dept: PREADMISSION TESTING | Facility: HOSPITAL | Age: 66
End: 2021-08-18

## 2021-08-18 ENCOUNTER — PRE-ADMISSION TESTING (OUTPATIENT)
Dept: PREADMISSION TESTING | Facility: HOSPITAL | Age: 66
End: 2021-08-18

## 2021-08-18 VITALS
WEIGHT: 181 LBS | RESPIRATION RATE: 16 BRPM | HEIGHT: 67 IN | BODY MASS INDEX: 28.41 KG/M2 | SYSTOLIC BLOOD PRESSURE: 142 MMHG | DIASTOLIC BLOOD PRESSURE: 78 MMHG | OXYGEN SATURATION: 96 % | HEART RATE: 76 BPM

## 2021-08-18 DIAGNOSIS — G89.29 CHRONIC PAIN OF RIGHT KNEE: ICD-10-CM

## 2021-08-18 DIAGNOSIS — M23.91 INTERNAL DERANGEMENT OF RIGHT KNEE: ICD-10-CM

## 2021-08-18 DIAGNOSIS — S83.271D COMPLEX TEAR OF LATERAL MENISCUS OF RIGHT KNEE AS CURRENT INJURY, SUBSEQUENT ENCOUNTER: ICD-10-CM

## 2021-08-18 DIAGNOSIS — M25.561 CHRONIC PAIN OF RIGHT KNEE: ICD-10-CM

## 2021-08-18 DIAGNOSIS — M17.11 PRIMARY OSTEOARTHRITIS OF RIGHT KNEE: ICD-10-CM

## 2021-08-18 DIAGNOSIS — M71.21 SYNOVIAL CYST OF RIGHT POPLITEAL SPACE: ICD-10-CM

## 2021-08-18 DIAGNOSIS — M94.261 CHONDROMALACIA OF RIGHT KNEE: ICD-10-CM

## 2021-08-18 LAB
ABO GROUP BLD: NORMAL
ANION GAP SERPL CALCULATED.3IONS-SCNC: 11 MMOL/L (ref 5–15)
BLD GP AB SCN SERPL QL: NEGATIVE
BUN SERPL-MCNC: 20 MG/DL (ref 8–23)
BUN/CREAT SERPL: 26.7 (ref 7–25)
CALCIUM SPEC-SCNC: 8.8 MG/DL (ref 8.6–10.5)
CHLORIDE SERPL-SCNC: 103 MMOL/L (ref 98–107)
CO2 SERPL-SCNC: 25 MMOL/L (ref 22–29)
CREAT SERPL-MCNC: 0.75 MG/DL (ref 0.57–1)
DEPRECATED RDW RBC AUTO: 42.3 FL (ref 37–54)
ERYTHROCYTE [DISTWIDTH] IN BLOOD BY AUTOMATED COUNT: 12.6 % (ref 12.3–15.4)
GFR SERPL CREATININE-BSD FRML MDRD: 77 ML/MIN/1.73
GLUCOSE SERPL-MCNC: 98 MG/DL (ref 65–99)
HCT VFR BLD AUTO: 43.4 % (ref 34–46.6)
HGB BLD-MCNC: 14.7 G/DL (ref 12–15.9)
Lab: NORMAL
MCH RBC QN AUTO: 31.1 PG (ref 26.6–33)
MCHC RBC AUTO-ENTMCNC: 33.9 G/DL (ref 31.5–35.7)
MCV RBC AUTO: 91.9 FL (ref 79–97)
MRSA DNA SPEC QL NAA+PROBE: NEGATIVE
PLATELET # BLD AUTO: 273 10*3/MM3 (ref 140–450)
PMV BLD AUTO: 9.8 FL (ref 6–12)
POTASSIUM SERPL-SCNC: 3.8 MMOL/L (ref 3.5–5.2)
RBC # BLD AUTO: 4.72 10*6/MM3 (ref 3.77–5.28)
RH BLD: POSITIVE
SODIUM SERPL-SCNC: 139 MMOL/L (ref 136–145)
T&S EXPIRATION DATE: NORMAL
WBC # BLD AUTO: 6.08 10*3/MM3 (ref 3.4–10.8)

## 2021-08-18 PROCEDURE — 36415 COLL VENOUS BLD VENIPUNCTURE: CPT

## 2021-08-18 PROCEDURE — 86900 BLOOD TYPING SEROLOGIC ABO: CPT

## 2021-08-18 PROCEDURE — 80048 BASIC METABOLIC PNL TOTAL CA: CPT

## 2021-08-18 PROCEDURE — 85027 COMPLETE CBC AUTOMATED: CPT

## 2021-08-18 PROCEDURE — 86850 RBC ANTIBODY SCREEN: CPT

## 2021-08-18 PROCEDURE — 93010 ELECTROCARDIOGRAM REPORT: CPT | Performed by: INTERNAL MEDICINE

## 2021-08-18 PROCEDURE — 86901 BLOOD TYPING SEROLOGIC RH(D): CPT

## 2021-08-18 PROCEDURE — 93005 ELECTROCARDIOGRAM TRACING: CPT

## 2021-08-18 PROCEDURE — 87641 MR-STAPH DNA AMP PROBE: CPT

## 2021-08-18 RX ORDER — HYDROCHLOROTHIAZIDE 25 MG/1
25 TABLET ORAL DAILY
COMMUNITY
End: 2022-08-11 | Stop reason: SDUPTHER

## 2021-08-18 RX ORDER — OMEPRAZOLE 20 MG/1
20 CAPSULE, DELAYED RELEASE ORAL DAILY
COMMUNITY
End: 2022-07-07

## 2021-08-18 RX ORDER — NAPROXEN SODIUM 220 MG
220 TABLET ORAL 2 TIMES DAILY PRN
COMMUNITY
End: 2021-08-30 | Stop reason: HOSPADM

## 2021-08-18 RX ORDER — SODIUM CHLORIDE, SODIUM GLUCONATE, SODIUM ACETATE, POTASSIUM CHLORIDE AND MAGNESIUM CHLORIDE 526; 502; 368; 37; 30 MG/100ML; MG/100ML; MG/100ML; MG/100ML; MG/100ML
1000 INJECTION, SOLUTION INTRAVENOUS CONTINUOUS PRN
Status: CANCELLED | OUTPATIENT
Start: 2021-08-23

## 2021-08-18 RX ORDER — ATORVASTATIN CALCIUM 40 MG/1
40 TABLET, FILM COATED ORAL NIGHTLY
COMMUNITY
End: 2021-10-11 | Stop reason: SDUPTHER

## 2021-08-18 ASSESSMENT — KOOS JR
KOOS JR SCORE: 14
KOOS JR SCORE: 52.465

## 2021-08-18 NOTE — PAT
Chlorhexidine scrub given with instruction sheet. Instructions reviewed in PAT, understanding verbalized.    Patient has had a stroke in the past, does not have to stop Aspirin before procedure.    Patient and  watched joint film.

## 2021-08-18 NOTE — DISCHARGE INSTRUCTIONS
[facility]  Pre-op Information and Guidelines    You will be called after 2 p.m. the day before your surgery (Friday for Monday surgery) and notified of your time for arrival and approximate surgery time.  If you have not received a call by 4P.M., please contact Same Day Surgery at [phone] of if outside [facility] call [phone].    Please Follow these Important Safety Guidelines:    • The morning of your procedure, take only the medications listed below with   A sip of water:_____________________________________________       ______________________________________________    • DO NOT eat or drink anything after 12:00 midnight the night before surgery  Specific instructions concerning drinking clear liquids will be discussed during  the pre-surgery instruction call the day before your surgery.    • If you take a blood thinner (ex. Plavix, Coumadin, aspirin), ask your doctor when to stop it before surgery  STOP DATE: _________________    • Only 2 visitors are allowed in patient rooms at a time  Your visitors will be asked to wait in the lobby until the admission process is complete with the exception of a parent with a child and patients in need of special assistance.    • YOU CANNOT DRIVE YOURSELF HOME  You must be accompanied by someone who will be responsible for driving you home after surgery and for your care at home.    • DO NOT chew gum, use breath mints, hard candy, or smoke the day of surgery  • DO NOT drink alcohol for at least 24 hours before your surgery  • DO NOT wear any jewelry and remove all body piercing before coming to the hospital  • DO NOT wear make-up to the hospital  • If you are having surgery on an extremity (arm/leg/foot) remove nail polish/artificial nails on the surgical side  • Clothing, glasses, contacts, dentures, and hairpieces must be removed before surgery  • Bathe the night before or the morning of your surgery and do not use powders/lotions on  skin.    Nothing solid/dairy after midnight. Can have clear liquids up until 3 hours prior to procedure. Drink a 20 ounce Gatorade (must be completed by 3 hours prior to procedure time). No red Gatorade.

## 2021-08-19 LAB
QT INTERVAL: 372 MS
QTC INTERVAL: 401 MS

## 2021-08-20 ENCOUNTER — OFFICE VISIT (OUTPATIENT)
Dept: FAMILY MEDICINE CLINIC | Facility: CLINIC | Age: 66
End: 2021-08-20

## 2021-08-20 ENCOUNTER — LAB (OUTPATIENT)
Dept: LAB | Facility: HOSPITAL | Age: 66
End: 2021-08-20

## 2021-08-20 VITALS
BODY MASS INDEX: 28.41 KG/M2 | HEART RATE: 80 BPM | WEIGHT: 181 LBS | HEIGHT: 67 IN | OXYGEN SATURATION: 97 % | DIASTOLIC BLOOD PRESSURE: 71 MMHG | SYSTOLIC BLOOD PRESSURE: 120 MMHG

## 2021-08-20 DIAGNOSIS — Z01.818 PREOP TESTING: Primary | ICD-10-CM

## 2021-08-20 DIAGNOSIS — E78.2 MIXED HYPERLIPIDEMIA: ICD-10-CM

## 2021-08-20 DIAGNOSIS — I10 ESSENTIAL HYPERTENSION: Primary | ICD-10-CM

## 2021-08-20 DIAGNOSIS — M17.11 PRIMARY OSTEOARTHRITIS OF RIGHT KNEE: ICD-10-CM

## 2021-08-20 LAB — SARS-COV-2 N GENE RESP QL NAA+PROBE: NOT DETECTED

## 2021-08-20 PROCEDURE — C9803 HOPD COVID-19 SPEC COLLECT: HCPCS

## 2021-08-20 PROCEDURE — 99213 OFFICE O/P EST LOW 20 MIN: CPT | Performed by: FAMILY MEDICINE

## 2021-08-20 PROCEDURE — 87635 SARS-COV-2 COVID-19 AMP PRB: CPT

## 2021-08-20 RX ORDER — ALBUTEROL SULFATE 90 UG/1
2 AEROSOL, METERED RESPIRATORY (INHALATION) EVERY 6 HOURS PRN
Qty: 18 G | Refills: 0 | Status: SHIPPED | OUTPATIENT
Start: 2021-08-20

## 2021-08-20 NOTE — PROGRESS NOTES
"Chief Complaint  Hypertension and Hyperlipidemia    Subjective          Ann Marie Malik presents to [facility]  History of Present Illness    Patient seen today for follow-up hypertension and hyperlipidemia.  She notes that she is overall been doing well.  For blood pressure she is taking valsartan 160 mg daily and hydrochlorothiazide 25 mg daily.  No problems with these medications.  For hyperlipidemia she is taking Lipitor 40 mg at night.  Patient has overall been feeling well.  No TIA or stroke symptoms since last visit.  Patient is taking aspirin daily.  Patient has upcoming knee replacement surgery next week.    Objective   Vital Signs:   /71   Pulse 80   Ht 170.2 cm (67\")   Wt 82.1 kg (181 lb)   SpO2 97%   BMI 28.35 kg/m²     Physical Exam  Vitals reviewed.   Constitutional:       General: She is not in acute distress.     Appearance: She is well-developed.   HENT:      Head: Normocephalic and atraumatic.   Cardiovascular:      Rate and Rhythm: Normal rate and regular rhythm.      Heart sounds: Normal heart sounds. No murmur heard.     Pulmonary:      Effort: Pulmonary effort is normal. No respiratory distress.      Breath sounds: Normal breath sounds. No wheezing or rales.   Abdominal:      Palpations: Abdomen is soft.      Tenderness: There is no abdominal tenderness.   Skin:     General: Skin is warm and dry.   Neurological:      Mental Status: She is alert and oriented to person, place, and time.        Result Review :   The following data was reviewed by: Verito He MD on 08/20/2021:  Common labs    Common Labsle 4/30/21 7/2/21 8/18/21 8/18/21      0907 0907   Glucose  90  98   BUN  15  20   Creatinine  0.91  0.75   eGFR Non African Am  62  77   Sodium  139  139   Potassium  3.6  3.8   Chloride  101  103   Calcium  9.6  8.8   Albumin  4.60     Total Bilirubin  0.5     Alkaline Phosphatase  151 (A)     AST (SGOT)  22     ALT (SGPT)  18     WBC   6.08  "   Hemoglobin   14.7    Hematocrit   43.4    Platelets   273    Triglycerides 387 (A)      (A) Abnormal value                      Assessment and Plan    Diagnoses and all orders for this visit:    1. Essential hypertension (Primary)    2. Mixed hyperlipidemia    3. Primary osteoarthritis of right knee    Other orders  -     albuterol sulfate  (90 Base) MCG/ACT inhaler; Inhale 2 puffs Every 6 (Six) Hours As Needed for Wheezing.  Dispense: 18 g; Refill: 0      Patient seen today for follow-up.  Blood pressure well controlled.  Continue current medications.  Tolerating Lipitor without any adverse effects.  Continue this medication.  Has right total knee arthroplasty scheduled next week for primary osteoarthritis of the right knee.          Follow Up   Return in about 6 weeks (around 10/1/2021) for Recheck.  Patient was given instructions and counseling regarding her condition or for health maintenance advice. Please see specific information pulled into the AVS if appropriate.         This document has been electronically signed by Verito He MD

## 2021-08-23 ENCOUNTER — HOSPITAL ENCOUNTER (OUTPATIENT)
Facility: HOSPITAL | Age: 66
Discharge: HOME | End: 2021-08-24
Attending: ORTHOPAEDIC SURGERY | Admitting: ORTHOPAEDIC SURGERY

## 2021-08-23 ENCOUNTER — APPOINTMENT (OUTPATIENT)
Dept: GENERAL RADIOLOGY | Facility: HOSPITAL | Age: 66
End: 2021-08-23

## 2021-08-23 ENCOUNTER — ANESTHESIA EVENT (OUTPATIENT)
Dept: PERIOP | Facility: HOSPITAL | Age: 66
End: 2021-08-23

## 2021-08-23 ENCOUNTER — ANESTHESIA (OUTPATIENT)
Dept: PERIOP | Facility: HOSPITAL | Age: 66
End: 2021-08-23

## 2021-08-23 DIAGNOSIS — M25.561 CHRONIC PAIN OF RIGHT KNEE: ICD-10-CM

## 2021-08-23 DIAGNOSIS — Z78.9 IMPAIRED MOBILITY AND ACTIVITIES OF DAILY LIVING: ICD-10-CM

## 2021-08-23 DIAGNOSIS — M17.11 PRIMARY OSTEOARTHRITIS OF RIGHT KNEE: Primary | ICD-10-CM

## 2021-08-23 DIAGNOSIS — S83.271D COMPLEX TEAR OF LATERAL MENISCUS OF RIGHT KNEE AS CURRENT INJURY, SUBSEQUENT ENCOUNTER: ICD-10-CM

## 2021-08-23 DIAGNOSIS — M23.91 INTERNAL DERANGEMENT OF RIGHT KNEE: ICD-10-CM

## 2021-08-23 DIAGNOSIS — Z74.09 IMPAIRED MOBILITY AND ACTIVITIES OF DAILY LIVING: ICD-10-CM

## 2021-08-23 DIAGNOSIS — Z74.09 IMPAIRED FUNCTIONAL MOBILITY, BALANCE, GAIT, AND ENDURANCE: ICD-10-CM

## 2021-08-23 DIAGNOSIS — M71.21 SYNOVIAL CYST OF RIGHT POPLITEAL SPACE: ICD-10-CM

## 2021-08-23 DIAGNOSIS — G89.29 CHRONIC PAIN OF RIGHT KNEE: ICD-10-CM

## 2021-08-23 DIAGNOSIS — M94.261 CHONDROMALACIA OF RIGHT KNEE: ICD-10-CM

## 2021-08-23 LAB
ABO GROUP BLD: NORMAL
BLD GP AB SCN SERPL QL: NEGATIVE
HCT VFR BLD AUTO: 36.9 % (ref 34–46.6)
HGB BLD-MCNC: 12.1 G/DL (ref 12–15.9)
Lab: NORMAL
RH BLD: POSITIVE
T&S EXPIRATION DATE: NORMAL

## 2021-08-23 PROCEDURE — 86850 RBC ANTIBODY SCREEN: CPT | Performed by: ANESTHESIOLOGY

## 2021-08-23 PROCEDURE — 27447 TOTAL KNEE ARTHROPLASTY: CPT | Performed by: SPECIALIST/TECHNOLOGIST, OTHER

## 2021-08-23 PROCEDURE — 25010000002 FENTANYL CITRATE (PF) 50 MCG/ML SOLUTION: Performed by: NURSE ANESTHETIST, CERTIFIED REGISTERED

## 2021-08-23 PROCEDURE — 85014 HEMATOCRIT: CPT | Performed by: ORTHOPAEDIC SURGERY

## 2021-08-23 PROCEDURE — 25010000002 PROPOFOL 10 MG/ML EMULSION: Performed by: NURSE ANESTHETIST, CERTIFIED REGISTERED

## 2021-08-23 PROCEDURE — 73560 X-RAY EXAM OF KNEE 1 OR 2: CPT

## 2021-08-23 PROCEDURE — 97162 PT EVAL MOD COMPLEX 30 MIN: CPT

## 2021-08-23 PROCEDURE — 25010000002 ONDANSETRON PER 1 MG: Performed by: ORTHOPAEDIC SURGERY

## 2021-08-23 PROCEDURE — 97166 OT EVAL MOD COMPLEX 45 MIN: CPT

## 2021-08-23 PROCEDURE — C1713 ANCHOR/SCREW BN/BN,TIS/BN: HCPCS | Performed by: ORTHOPAEDIC SURGERY

## 2021-08-23 PROCEDURE — 86900 BLOOD TYPING SEROLOGIC ABO: CPT | Performed by: ANESTHESIOLOGY

## 2021-08-23 PROCEDURE — 86901 BLOOD TYPING SEROLOGIC RH(D): CPT | Performed by: ANESTHESIOLOGY

## 2021-08-23 PROCEDURE — 85018 HEMOGLOBIN: CPT | Performed by: ORTHOPAEDIC SURGERY

## 2021-08-23 PROCEDURE — 88300 SURGICAL PATH GROSS: CPT

## 2021-08-23 PROCEDURE — 27447 TOTAL KNEE ARTHROPLASTY: CPT | Performed by: ORTHOPAEDIC SURGERY

## 2021-08-23 PROCEDURE — 25010000002 CEFAZOLIN PER 500 MG: Performed by: ORTHOPAEDIC SURGERY

## 2021-08-23 PROCEDURE — C1776 JOINT DEVICE (IMPLANTABLE): HCPCS | Performed by: ORTHOPAEDIC SURGERY

## 2021-08-23 PROCEDURE — 25010000002 MIDAZOLAM PER 1 MG: Performed by: NURSE ANESTHETIST, CERTIFIED REGISTERED

## 2021-08-23 DEVICE — ATTUNE KNEE SYSTEM FEMORAL POSTERIOR STABILIZED NARROW SIZE 5N RIGHT CEMENTED
Type: IMPLANTABLE DEVICE | Site: KNEE | Status: FUNCTIONAL
Brand: ATTUNE

## 2021-08-23 DEVICE — SMARTSET HV HIGH VISCOSITY BONE CEMENT 40G
Type: IMPLANTABLE DEVICE | Site: KNEE | Status: FUNCTIONAL
Brand: SMARTSET

## 2021-08-23 DEVICE — ATTUNE PINNING SYSTEM
Type: IMPLANTABLE DEVICE | Site: KNEE | Status: FUNCTIONAL
Brand: ATTUNE

## 2021-08-23 DEVICE — TOTL KN ATTUNE DEPUY 9527038: Type: IMPLANTABLE DEVICE | Site: KNEE | Status: FUNCTIONAL

## 2021-08-23 DEVICE — ATTUNE PATELLA MEDIALIZED DOME 32MM CEMENTED AOX
Type: IMPLANTABLE DEVICE | Site: KNEE | Status: FUNCTIONAL
Brand: ATTUNE

## 2021-08-23 DEVICE — ATTUNE KNEE SYSTEM TIBIAL INSERT ROTATING PLATFORM POSTERIOR STABILIZED 5 6MM AOX
Type: IMPLANTABLE DEVICE | Site: KNEE | Status: FUNCTIONAL
Brand: ATTUNE

## 2021-08-23 DEVICE — ATTUNE KNEE SYSTEM TIBIAL BASE ROTATING PLATFORM SIZE 5 CEMENTED
Type: IMPLANTABLE DEVICE | Site: KNEE | Status: FUNCTIONAL
Brand: ATTUNE

## 2021-08-23 RX ORDER — FERROUS SULFATE TAB EC 324 MG (65 MG FE EQUIVALENT) 324 (65 FE) MG
324 TABLET DELAYED RESPONSE ORAL
Status: DISCONTINUED | OUTPATIENT
Start: 2021-08-23 | End: 2021-08-24 | Stop reason: HOSPADM

## 2021-08-23 RX ORDER — OXYCODONE HCL 10 MG/1
10 TABLET, FILM COATED, EXTENDED RELEASE ORAL EVERY 12 HOURS SCHEDULED
Status: DISCONTINUED | OUTPATIENT
Start: 2021-08-23 | End: 2021-08-24 | Stop reason: HOSPADM

## 2021-08-23 RX ORDER — MIDAZOLAM HYDROCHLORIDE 1 MG/ML
INJECTION INTRAMUSCULAR; INTRAVENOUS AS NEEDED
Status: DISCONTINUED | OUTPATIENT
Start: 2021-08-23 | End: 2021-08-23 | Stop reason: SURG

## 2021-08-23 RX ORDER — BUPIVACAINE HYDROCHLORIDE 5 MG/ML
INJECTION, SOLUTION EPIDURAL; INTRACAUDAL
Status: COMPLETED | OUTPATIENT
Start: 2021-08-23 | End: 2021-08-23

## 2021-08-23 RX ORDER — ACETAMINOPHEN 500 MG
1000 TABLET ORAL 4 TIMES DAILY
Status: DISCONTINUED | OUTPATIENT
Start: 2021-08-23 | End: 2021-08-24 | Stop reason: HOSPADM

## 2021-08-23 RX ORDER — HYDROCHLOROTHIAZIDE 25 MG/1
25 TABLET ORAL DAILY
Status: DISCONTINUED | OUTPATIENT
Start: 2021-08-23 | End: 2021-08-24 | Stop reason: HOSPADM

## 2021-08-23 RX ORDER — ACETAMINOPHEN 325 MG/1
650 TABLET ORAL ONCE AS NEEDED
Status: DISCONTINUED | OUTPATIENT
Start: 2021-08-23 | End: 2021-08-23 | Stop reason: HOSPADM

## 2021-08-23 RX ORDER — OXYCODONE HYDROCHLORIDE 5 MG/1
5 TABLET ORAL EVERY 4 HOURS PRN
Status: DISCONTINUED | OUTPATIENT
Start: 2021-08-23 | End: 2021-08-24 | Stop reason: HOSPADM

## 2021-08-23 RX ORDER — SODIUM CHLORIDE 9 MG/ML
100 INJECTION, SOLUTION INTRAVENOUS CONTINUOUS
Status: DISCONTINUED | OUTPATIENT
Start: 2021-08-23 | End: 2021-08-24 | Stop reason: HOSPADM

## 2021-08-23 RX ORDER — PROMETHAZINE HYDROCHLORIDE 25 MG/1
25 SUPPOSITORY RECTAL ONCE AS NEEDED
Status: DISCONTINUED | OUTPATIENT
Start: 2021-08-23 | End: 2021-08-23 | Stop reason: HOSPADM

## 2021-08-23 RX ORDER — ONDANSETRON 2 MG/ML
4 INJECTION INTRAMUSCULAR; INTRAVENOUS ONCE AS NEEDED
Status: DISCONTINUED | OUTPATIENT
Start: 2021-08-23 | End: 2021-08-23 | Stop reason: HOSPADM

## 2021-08-23 RX ORDER — ASPIRIN 81 MG/1
81 TABLET, CHEWABLE ORAL 2 TIMES DAILY
Status: DISCONTINUED | OUTPATIENT
Start: 2021-08-23 | End: 2021-08-24 | Stop reason: HOSPADM

## 2021-08-23 RX ORDER — ALBUTEROL SULFATE 2.5 MG/3ML
2.5 SOLUTION RESPIRATORY (INHALATION) EVERY 6 HOURS PRN
Status: DISCONTINUED | OUTPATIENT
Start: 2021-08-23 | End: 2021-08-24 | Stop reason: HOSPADM

## 2021-08-23 RX ORDER — ONDANSETRON 4 MG/1
4 TABLET, FILM COATED ORAL EVERY 6 HOURS PRN
Status: DISCONTINUED | OUTPATIENT
Start: 2021-08-23 | End: 2021-08-24 | Stop reason: HOSPADM

## 2021-08-23 RX ORDER — PROMETHAZINE HYDROCHLORIDE 25 MG/1
25 TABLET ORAL ONCE AS NEEDED
Status: DISCONTINUED | OUTPATIENT
Start: 2021-08-23 | End: 2021-08-23 | Stop reason: HOSPADM

## 2021-08-23 RX ORDER — ONDANSETRON 2 MG/ML
4 INJECTION INTRAMUSCULAR; INTRAVENOUS EVERY 6 HOURS PRN
Status: DISCONTINUED | OUTPATIENT
Start: 2021-08-23 | End: 2021-08-24 | Stop reason: HOSPADM

## 2021-08-23 RX ORDER — OXYCODONE HCL 10 MG/1
10 TABLET, FILM COATED, EXTENDED RELEASE ORAL ONCE
Status: COMPLETED | OUTPATIENT
Start: 2021-08-23 | End: 2021-08-23

## 2021-08-23 RX ORDER — SODIUM CHLORIDE 0.9 % (FLUSH) 0.9 %
3-10 SYRINGE (ML) INJECTION AS NEEDED
Status: DISCONTINUED | OUTPATIENT
Start: 2021-08-23 | End: 2021-08-24 | Stop reason: HOSPADM

## 2021-08-23 RX ORDER — FAMOTIDINE 40 MG/1
40 TABLET, FILM COATED ORAL DAILY
Status: DISCONTINUED | OUTPATIENT
Start: 2021-08-23 | End: 2021-08-24 | Stop reason: HOSPADM

## 2021-08-23 RX ORDER — PREGABALIN 75 MG/1
75 CAPSULE ORAL ONCE
Status: COMPLETED | OUTPATIENT
Start: 2021-08-23 | End: 2021-08-23

## 2021-08-23 RX ORDER — ACETAMINOPHEN 650 MG/1
650 SUPPOSITORY RECTAL ONCE AS NEEDED
Status: DISCONTINUED | OUTPATIENT
Start: 2021-08-23 | End: 2021-08-23 | Stop reason: HOSPADM

## 2021-08-23 RX ORDER — BUPIVACAINE HCL/0.9 % NACL/PF 0.1 %
2 PLASTIC BAG, INJECTION (ML) EPIDURAL ONCE
Status: COMPLETED | OUTPATIENT
Start: 2021-08-23 | End: 2021-08-23

## 2021-08-23 RX ORDER — ALBUTEROL SULFATE 90 UG/1
2 AEROSOL, METERED RESPIRATORY (INHALATION) EVERY 6 HOURS PRN
Status: DISCONTINUED | OUTPATIENT
Start: 2021-08-23 | End: 2021-08-23

## 2021-08-23 RX ORDER — FLUMAZENIL 0.1 MG/ML
0.2 INJECTION INTRAVENOUS AS NEEDED
Status: DISCONTINUED | OUTPATIENT
Start: 2021-08-23 | End: 2021-08-23 | Stop reason: HOSPADM

## 2021-08-23 RX ORDER — MEPERIDINE HYDROCHLORIDE 25 MG/ML
12.5 INJECTION INTRAMUSCULAR; INTRAVENOUS; SUBCUTANEOUS
Status: DISCONTINUED | OUTPATIENT
Start: 2021-08-23 | End: 2021-08-23 | Stop reason: HOSPADM

## 2021-08-23 RX ORDER — BUPIVACAINE HYDROCHLORIDE 7.5 MG/ML
INJECTION, SOLUTION EPIDURAL; RETROBULBAR
Status: COMPLETED | OUTPATIENT
Start: 2021-08-23 | End: 2021-08-23

## 2021-08-23 RX ORDER — NALOXONE HCL 0.4 MG/ML
0.4 VIAL (ML) INJECTION AS NEEDED
Status: DISCONTINUED | OUTPATIENT
Start: 2021-08-23 | End: 2021-08-23 | Stop reason: HOSPADM

## 2021-08-23 RX ORDER — FENTANYL CITRATE 50 UG/ML
INJECTION, SOLUTION INTRAMUSCULAR; INTRAVENOUS AS NEEDED
Status: DISCONTINUED | OUTPATIENT
Start: 2021-08-23 | End: 2021-08-23 | Stop reason: SURG

## 2021-08-23 RX ORDER — MELOXICAM 15 MG/1
15 TABLET ORAL ONCE
Status: COMPLETED | OUTPATIENT
Start: 2021-08-23 | End: 2021-08-23

## 2021-08-23 RX ORDER — ATORVASTATIN CALCIUM 40 MG/1
40 TABLET, FILM COATED ORAL NIGHTLY
Status: DISCONTINUED | OUTPATIENT
Start: 2021-08-23 | End: 2021-08-24 | Stop reason: HOSPADM

## 2021-08-23 RX ORDER — VALSARTAN 160 MG/1
160 TABLET ORAL DAILY
Status: DISCONTINUED | OUTPATIENT
Start: 2021-08-23 | End: 2021-08-24 | Stop reason: HOSPADM

## 2021-08-23 RX ORDER — NALOXONE HCL 0.4 MG/ML
0.1 VIAL (ML) INJECTION
Status: DISCONTINUED | OUTPATIENT
Start: 2021-08-23 | End: 2021-08-24 | Stop reason: HOSPADM

## 2021-08-23 RX ORDER — DIPHENHYDRAMINE HYDROCHLORIDE 50 MG/ML
12.5 INJECTION INTRAMUSCULAR; INTRAVENOUS
Status: DISCONTINUED | OUTPATIENT
Start: 2021-08-23 | End: 2021-08-23 | Stop reason: HOSPADM

## 2021-08-23 RX ORDER — BUPIVACAINE HCL/0.9 % NACL/PF 0.1 %
2 PLASTIC BAG, INJECTION (ML) EPIDURAL EVERY 8 HOURS
Status: COMPLETED | OUTPATIENT
Start: 2021-08-23 | End: 2021-08-23

## 2021-08-23 RX ORDER — SODIUM CHLORIDE, SODIUM GLUCONATE, SODIUM ACETATE, POTASSIUM CHLORIDE AND MAGNESIUM CHLORIDE 526; 502; 368; 37; 30 MG/100ML; MG/100ML; MG/100ML; MG/100ML; MG/100ML
1000 INJECTION, SOLUTION INTRAVENOUS CONTINUOUS PRN
Status: DISCONTINUED | OUTPATIENT
Start: 2021-08-23 | End: 2021-08-24 | Stop reason: HOSPADM

## 2021-08-23 RX ORDER — ACETAMINOPHEN 325 MG/1
1000 TABLET ORAL ONCE
Status: COMPLETED | OUTPATIENT
Start: 2021-08-23 | End: 2021-08-23

## 2021-08-23 RX ORDER — SODIUM CHLORIDE 0.9 % (FLUSH) 0.9 %
3 SYRINGE (ML) INJECTION EVERY 12 HOURS SCHEDULED
Status: DISCONTINUED | OUTPATIENT
Start: 2021-08-23 | End: 2021-08-24 | Stop reason: HOSPADM

## 2021-08-23 RX ORDER — AMOXICILLIN 250 MG
2 CAPSULE ORAL 2 TIMES DAILY
Status: DISCONTINUED | OUTPATIENT
Start: 2021-08-23 | End: 2021-08-24 | Stop reason: HOSPADM

## 2021-08-23 RX ORDER — EPHEDRINE SULFATE 50 MG/ML
5 INJECTION, SOLUTION INTRAVENOUS ONCE AS NEEDED
Status: DISCONTINUED | OUTPATIENT
Start: 2021-08-23 | End: 2021-08-23 | Stop reason: HOSPADM

## 2021-08-23 RX ADMIN — OXYCODONE 5 MG: 5 TABLET ORAL at 21:25

## 2021-08-23 RX ADMIN — FENTANYL CITRATE 50 MCG: 50 INJECTION INTRAMUSCULAR; INTRAVENOUS at 07:21

## 2021-08-23 RX ADMIN — CEFAZOLIN 2 G: 10 INJECTION, POWDER, FOR SOLUTION INTRAVENOUS at 21:34

## 2021-08-23 RX ADMIN — SODIUM CHLORIDE, SODIUM GLUCONATE, SODIUM ACETATE, POTASSIUM CHLORIDE AND MAGNESIUM CHLORIDE: 526; 502; 368; 37; 30 INJECTION, SOLUTION INTRAVENOUS at 08:29

## 2021-08-23 RX ADMIN — OXYCODONE HYDROCHLORIDE 10 MG: 10 TABLET, FILM COATED, EXTENDED RELEASE ORAL at 11:35

## 2021-08-23 RX ADMIN — PROPOFOL 50 MCG/KG/MIN: 10 INJECTION, EMULSION INTRAVENOUS at 07:25

## 2021-08-23 RX ADMIN — MELOXICAM 15 MG: 15 TABLET ORAL at 06:05

## 2021-08-23 RX ADMIN — TRANEXAMIC ACID 1000 MG: 100 INJECTION, SOLUTION INTRAVENOUS at 08:39

## 2021-08-23 RX ADMIN — ACETAMINOPHEN 1000 MG: 500 TABLET, FILM COATED ORAL at 17:19

## 2021-08-23 RX ADMIN — ASPIRIN 81 MG: 81 TABLET, CHEWABLE ORAL at 20:14

## 2021-08-23 RX ADMIN — ACETAMINOPHEN 1000 MG: 500 TABLET, FILM COATED ORAL at 11:35

## 2021-08-23 RX ADMIN — ACETAMINOPHEN 975 MG: 325 TABLET, FILM COATED ORAL at 06:05

## 2021-08-23 RX ADMIN — BUPIVACAINE HYDROCHLORIDE 30 ML: 5 INJECTION, SOLUTION EPIDURAL; INTRACAUDAL; PERINEURAL at 07:16

## 2021-08-23 RX ADMIN — ACETAMINOPHEN 1000 MG: 500 TABLET, FILM COATED ORAL at 20:14

## 2021-08-23 RX ADMIN — MIDAZOLAM HYDROCHLORIDE 2 MG: 2 INJECTION, SOLUTION INTRAMUSCULAR; INTRAVENOUS at 07:04

## 2021-08-23 RX ADMIN — ONDANSETRON 4 MG: 2 INJECTION INTRAMUSCULAR; INTRAVENOUS at 19:27

## 2021-08-23 RX ADMIN — DOCUSATE SODIUM 50 MG AND SENNOSIDES 8.6 MG 2 TABLET: 8.6; 5 TABLET, FILM COATED ORAL at 20:14

## 2021-08-23 RX ADMIN — ATORVASTATIN CALCIUM 40 MG: 40 TABLET, FILM COATED ORAL at 20:14

## 2021-08-23 RX ADMIN — SODIUM CHLORIDE 100 ML/HR: 9 INJECTION, SOLUTION INTRAVENOUS at 21:34

## 2021-08-23 RX ADMIN — FENTANYL CITRATE 50 MCG: 50 INJECTION INTRAMUSCULAR; INTRAVENOUS at 09:13

## 2021-08-23 RX ADMIN — OXYCODONE HYDROCHLORIDE 10 MG: 10 TABLET, FILM COATED, EXTENDED RELEASE ORAL at 20:14

## 2021-08-23 RX ADMIN — SODIUM CHLORIDE, PRESERVATIVE FREE 3 ML: 5 INJECTION INTRAVENOUS at 20:15

## 2021-08-23 RX ADMIN — ONDANSETRON 4 MG: 2 INJECTION INTRAMUSCULAR; INTRAVENOUS at 12:48

## 2021-08-23 RX ADMIN — SODIUM CHLORIDE 100 ML/HR: 9 INJECTION, SOLUTION INTRAVENOUS at 11:35

## 2021-08-23 RX ADMIN — ASPIRIN 81 MG: 81 TABLET, CHEWABLE ORAL at 11:35

## 2021-08-23 RX ADMIN — DOCUSATE SODIUM 50 MG AND SENNOSIDES 8.6 MG 2 TABLET: 8.6; 5 TABLET, FILM COATED ORAL at 11:35

## 2021-08-23 RX ADMIN — Medication 2 G: at 07:27

## 2021-08-23 RX ADMIN — BUPIVACAINE HYDROCHLORIDE 2 ML: 7.5 INJECTION, SOLUTION EPIDURAL; RETROBULBAR at 07:23

## 2021-08-23 RX ADMIN — CEFAZOLIN 2 G: 10 INJECTION, POWDER, FOR SOLUTION INTRAVENOUS at 13:43

## 2021-08-23 RX ADMIN — FAMOTIDINE 40 MG: 40 TABLET ORAL at 11:35

## 2021-08-23 RX ADMIN — OXYCODONE 5 MG: 5 TABLET ORAL at 13:42

## 2021-08-23 RX ADMIN — FERROUS SULFATE TAB EC 324 MG (65 MG FE EQUIVALENT) 324 MG: 324 (65 FE) TABLET DELAYED RESPONSE at 11:35

## 2021-08-23 RX ADMIN — OXYCODONE HYDROCHLORIDE 10 MG: 10 TABLET, FILM COATED, EXTENDED RELEASE ORAL at 06:05

## 2021-08-23 RX ADMIN — TRANEXAMIC ACID 1000 MG: 100 INJECTION, SOLUTION INTRAVENOUS at 06:04

## 2021-08-23 RX ADMIN — SODIUM CHLORIDE, SODIUM GLUCONATE, SODIUM ACETATE, POTASSIUM CHLORIDE AND MAGNESIUM CHLORIDE 1000 ML: 526; 502; 368; 37; 30 INJECTION, SOLUTION INTRAVENOUS at 06:05

## 2021-08-23 RX ADMIN — PREGABALIN 75 MG: 75 CAPSULE ORAL at 06:05

## 2021-08-23 NOTE — THERAPY EVALUATION
Patient Name: Ann Marie Malik  : 1955    MRN: [id number]                              Today's Date: 2021       Admit Date: 2021    Visit Dx:     ICD-10-CM ICD-9-CM   1. Chronic pain of right knee  M25.561 719.46    G89.29 338.29   2. Internal derangement of right knee  M23.91 717.9   3. Primary osteoarthritis of right knee  M17.11 715.16   4. Chondromalacia of right knee  M94.261 717.7   5. Synovial cyst of right popliteal space  M71.21 727.51   6. Complex tear of lateral meniscus of right knee as current injury, subsequent encounter  S83.271D V58.89   7. Impaired mobility and activities of daily living  Z74.09 V49.89    Z78.9      Patient Active Problem List   Diagnosis   • Chronic pain of right knee   • Primary osteoarthritis of right knee   • Internal derangement of right knee   • Degenerative tear of lateral meniscus of right knee   • Chondromalacia of right knee   • Synovial cyst of right popliteal space   • Cerebrovascular accident (CVA) (CMS/[facility])   • Complex tear of lateral meniscus of right knee as current injury     Past Medical History:   Diagnosis Date   • GERD (gastroesophageal reflux disease)    • Hyperlipidemia    • Hypertension    • Primary osteoarthritis of right knee 3/8/2020   • Stroke (CMS/[facility])      Past Surgical History:   Procedure Laterality Date   • TUBAL ABDOMINAL LIGATION       General Information     Row Name 21 1210          OT Time and Intention    Document Type  evaluation  -     Mode of Treatment  co-treatment;occupational therapy;physical therapy  -     Row Name 21 1210          General Information    Patient Profile Reviewed  yes  -     Prior Level of Function  independent:;all household mobility;community mobility;gait;transfer;bed mobility;cooking;cleaning;driving;ADL's  -     Existing Precautions/Restrictions  fall  -     Row Name 21 1210          Living Environment    Lives With  spouse  -     Row Name 21 1210           Home Main Entrance    Number of Stairs, Main Entrance  six  -     Stair Railings, Main Entrance  railings on both sides of stairs  -     Row Name 08/23/21 1210          Stairs Within Home, Primary    Stairs, Within Home, Primary  Walk-in shower - Has SPC and RW  -     Number of Stairs, Within Home, Primary  none  -     Stair Railings, Within Home, Primary  none  -     Row Name 08/23/21 1210          Cognition    Orientation Status (Cognition)  oriented x 4  -     Row Name 08/23/21 1210          Safety Issues, Functional Mobility    Impairments Affecting Function (Mobility)  pain;strength;endurance/activity tolerance;balance  -       User Key  (r) = Recorded By, (t) = Taken By, (c) = Cosigned By    Initials Name Provider Type     Rafy Ledesma OT Occupational Therapist          Mobility/ADL's     [facility] Name 08/23/21 1210          Bed Mobility    Bed Mobility  supine-sit;sit-supine  -     Supine-Sit [place] (Bed Mobility)  contact guard  -     Sit-Supine [place] (Bed Mobility)  contact guard  [facility]     Assistive Device (Bed Mobility)  bed rails;head of bed elevated  -[facility] Name 08/23/21 1210          Transfers    Transfers  sit-stand transfer  -     Sit-Stand [place] (Transfers)  contact guard  -[facility] Name 08/23/21 1210          Sit-Stand Transfer    Assistive Device (Sit-Stand Transfers)  walker, front-wheeled  -[facility] Name 08/23/21 1210          Functional Mobility    Functional Mobility- Ind. Level  contact guard assist  -     Functional Mobility- Device  rolling walker  -[facility] Name 08/23/21 1210          Activities of Daily Living    BADL Assessment/Intervention  lower body dressing;grooming  -[facility] Name 08/23/21 1210          Mobility    Extremity Weight-bearing Status  (S) right lower extremity  -     Right Lower Extremity (Weight-bearing Status)  (S) weight-bearing as tolerated (WBAT)  -[facility] Name 08/23/21 1210          Lower Body Dressing  Assessment/Training    [place] (Lower Body Dressing)  doff;don;pants/bottoms;socks;minimum assist (75% patient effort)  -     Position (Lower Body Dressing)  edge of bed sitting  -[facility] Name 08/23/21 1210          Grooming Assessment/Training    [place] (Grooming)  grooming skills;supervision;set up  -     Position (Grooming)  edge of bed sitting  -       User Key  (r) = Recorded By, (t) = Taken By, (c) = Cosigned By    Initials Name Provider Type     Rafy Ledesma OT Occupational Therapist        Obj/Interventions     [facility] Name 08/23/21 1210          Sensory Assessment (Somatosensory)    Sensory Assessment (Somatosensory)  UE sensation intact;sensation intact  -JH     Row Name 08/23/21 1210          Vision Assessment/Intervention    Visual Impairment/Limitations  corrective lenses full-time  -JH     Row Name 08/23/21 1210          Range of Motion Comprehensive    General Range of Motion  no range of motion deficits identified  -JH     Row Name 08/23/21 1210          Strength Comprehensive (MMT)    Comment, General Manual Muscle Testing (MMT) Assessment  BUE MMT Strength 4/5 Grossly  -       User Key  (r) = Recorded By, (t) = Taken By, (c) = Cosigned By    Initials Name Provider Type     Rafy Ledesma OT Occupational Therapist        Goals/Plan     Row Name 08/23/21 1210          Bed Mobility Goal 1 (OT)    Activity/Assistive Device (Bed Mobility Goal 1, OT)  sit to supine/supine to sit  [facility]     [place] Level/Cues Needed (Bed Mobility Goal 1, OT)  independent  -     Time Frame (Bed Mobility Goal 1, OT)  long term goal (LTG);by discharge  [facility]     Progress/Outcomes (Bed Mobility Goal 1, OT)  goal not met  -JH     Row Name 08/23/21 1210          Transfer Goal 1 (OT)    Activity/Assistive Device (Transfer Goal 1, OT)  sit-to-stand/stand-to-sit;toilet  -     [place] Level/Cues Needed (Transfer Goal 1, OT)  standby assist;verbal cues required;tactile cues required  [facility]      Time Frame (Transfer Goal 1, OT)  long term goal (LTG);by discharge  -     Progress/Outcome (Transfer Goal 1, OT)  goal not met  -     Row Name 08/23/21 1210          Bathing Goal 1 (OT)    Activity/Device (Bathing Goal 1, OT)  lower body bathing  -     [place] Level/Cues Needed (Bathing Goal 1, OT)  standby assist;verbal cues required;tactile cues required  -     Time Frame (Bathing Goal 1, OT)  long term goal (LTG);by discharge  -     Progress/Outcomes (Bathing Goal 1, OT)  goal not met  -[facility] Name 08/23/21 1210          Dressing Goal 1 (OT)    Activity/Device (Dressing Goal 1, OT)  lower body dressing  -     [place]/Cues Needed (Dressing Goal 1, OT)  standby assist;verbal cues required;tactile cues required  -     Time Frame (Dressing Goal 1, OT)  long term goal (LTG);by discharge  -     Progress/Outcome (Dressing Goal 1, OT)  goal not met  -[facility] Name 08/23/21 1210          Toileting Goal 1 (OT)    Activity/Device (Toileting Goal 1, OT)  toileting skills, all  -     [place] Level/Cues Needed (Toileting Goal 1, OT)  standby assist;verbal cues required;tactile cues required  -     Time Frame (Toileting Goal 1, OT)  long term goal (LTG);by discharge  -     Progress/Outcome (Toileting Goal 1, OT)  goal not met  -JH     Row Name 08/23/21 1210          Therapy Assessment/Plan (OT)    Planned Therapy Interventions (OT)  activity tolerance training;functional balance retraining;occupation/activity based interventions;ROM/therapeutic exercise;strengthening exercise;transfer/mobility retraining;patient/caregiver education/training;neuromuscular control/coordination retraining;BADL retraining  -       User Key  (r) = Recorded By, (t) = Taken By, (c) = Cosigned By    Initials Name Provider Type    Rafy Tobar OT Occupational Therapist        Clinical Impression     Row Name 08/23/21 1210          Pain Assessment    Additional Documentation  Pain Scale: Numbers  Pre/Post-Treatment (Group)  -JH     Row Name 08/23/21 1210          Pain Scale: Numbers Pre/Post-Treatment    Pretreatment Pain Rating  2/10  -     Posttreatment Pain Rating  3/10  -     Pain Location - Side  Right  -     Pain Location  knee  -[facility] 08/23/21 1210          Plan of Care Review    Plan of Care Reviewed With  patient  -JH     Row Name 08/23/21 1210          Therapy Assessment/Plan (OT)    Rehab Potential (OT)  good, to achieve stated therapy goals  -     Criteria for Skilled Therapeutic Interventions Met (OT)  yes;meets criteria;skilled treatment is necessary  -     Therapy Frequency (OT)  daily  -     Predicted Duration of Therapy Intervention (OT)  Until goals met or d/c  -JH     Row Name 08/23/21 1210          Therapy Plan Review/Discharge Plan (OT)    Anticipated Discharge Disposition (OT)  home with assist  -JH     Row Name 08/23/21 1210          Vital Signs    Pre Systolic BP Rehab  162  -     Pre Treatment Diastolic BP  71  -     Intra Systolic BP Rehab  131  -     Intra Treatment Diastolic BP  70  -     Post Systolic BP Rehab  132  -     Post Treatment Diastolic BP  94  -     Pretreatment Heart Rate (beats/min)  69  -     Intratreatment Heart Rate (beats/min)  61  -     Posttreatment Heart Rate (beats/min)  64  -     Pre SpO2 (%)  96  -     O2 Delivery Pre Treatment  room air  -     Post SpO2 (%)  97  -     O2 Delivery Post Treatment  room air  -     Pre Patient Position  Supine  -     Post Patient Position  Supine  -JH     Row Name 08/23/21 1210          Positioning and Restraints    Pre-Treatment Position  in bed  -     Post Treatment Position  bed  -     In Bed  supine;call light within reach;encouraged to call for assist;exit alarm on;side rails up x2;notified [facility]       User Key  (r) = Recorded By, (t) = Taken By, (c) = Cosigned By    Initials Name Provider Type     Rafy Ledesma, OT Occupational Therapist        Outcome Measures      Row Name 08/23/21 1210          How much help from another is currently needed...    Putting on and taking off regular lower body clothing?  3  -     Bathing (including washing, rinsing, and drying)  3  -     Toileting (which includes using toilet bed pan or urinal)  3  -     Putting on and taking off regular upper body clothing  3  -     Taking care of personal grooming (such as brushing teeth)  4  -     Eating meals  4  -     AM-[facility] 6 Clicks Score (OT)  20  -     Row Name 08/23/21 1210          Functional Assessment    Outcome Measure Options  AM-[facility] 6 Clicks Daily Activity (OT)  -       User Key  (r) = Recorded By, (t) = Taken By, (c) = Cosigned By    Initials Name Provider Type     Rafy Ledesma OT Occupational Therapist          Occupational Therapy Education                 Title: PT OT SLP Therapies (In Progress)     Topic: Occupational Therapy (In Progress)     Point: ADL training (Not Started)     Description:   Instruct learner(s) on proper safety adaptation and remediation techniques during self care or transfers.   Instruct in proper use of assistive devices.              Learner Progress:  Not documented in this visit.          Point: Home exercise program (Not Started)     Description:   Instruct learner(s) on appropriate technique for monitoring, assisting and/or progressing therapeutic exercises/activities.              Learner Progress:  Not documented in this visit.          Point: Precautions (Done)     Description:   Instruct learner(s) on prescribed precautions during self-care and functional transfers.              Learning Progress Summary           Patient Acceptance, E, VU by  at 8/23/2021 1220    Comment: Pt educated on role of OT, d/c recs, and POC                   Point: Body mechanics (Not Started)     Description:   Instruct learner(s) on proper positioning and spine alignment during self-care, functional mobility activities and/or exercises.               Learner Progress:  Not documented in this visit.                      User Key     Initials Effective Dates Name Provider Type Discipline     06/16/21 -  Rafy Ledesma OT Occupational Therapist OT              OT Recommendation and Plan  Planned Therapy Interventions (OT): activity tolerance training, functional balance retraining, occupation/activity based interventions, ROM/therapeutic exercise, strengthening exercise, transfer/mobility retraining, patient/caregiver education/training, neuromuscular control/coordination retraining, BADL retraining  Therapy Frequency (OT): daily  Plan of Care Review  Plan of Care Reviewed With: patient  Outcome Summary: OT Eval completed on this date as co-eval with PT. Pt pleasand and agreeable to therapy. Bed Mobility: CGA Transfers: CGA with use of RW Funct Mob:CGA with use of RW LBD: Min A to don/doff pants Grooming: Supervision with set-up. Pt with complaints of nausea and light headiness once ambulating and sitting on toilet. Pt noted to have drop in systolic blood pressure - RN aware. Pt demos decreased activity tolerance, decreased strength, decreased balance, decreased safety awareness, and increased pain with increased activity. Pt would benefit from continued skilled OT to address functional deficits. Recommend d/c home with assist.     Time Calculation:   Time Calculation- OT     Row Name 08/23/21 1453             Time Calculation- OT    OT Start Time  1210  -      OT Stop Time  1253  -      OT Time Calculation (min)  43 min  -      OT Received On  08/23/21  -      OT Goal Re-Cert Due Date  09/05/21  -         Untimed Charges    OT Eval/Re-eval Minutes  43  -JH         Total Minutes    Untimed Charges Total Minutes  43  -JH       Total Minutes  43  -JH        User Key  (r) = Recorded By, (t) = Taken By, (c) = Cosigned By    Initials Name Provider Type     Rafy Ledesma OT Occupational Therapist        Therapy Charges for Today     Code Description  Service Date Service Provider Modifiers Qty    40953090737 HC OT EVAL MOD COMPLEXITY 3 8/23/2021 Rafy Ledesma, OT GO 1               Rafy Ledesma OT  8/23/2021

## 2021-08-23 NOTE — ANESTHESIA PROCEDURE NOTES
Spinal Block      Patient reassessed immediately prior to procedure    Patient location during procedure: OR  Start Time: 8/23/2021 7:17 AM  Stop Time: 8/23/2021 7:24 AM  Indication:at surgeon's request and procedure for pain  Performed By  CRNA: Zaira Quick CRNA  Preanesthetic Checklist  Completed: patient identified, IV checked, site marked, risks and benefits discussed, surgical consent, monitors and equipment checked, pre-op evaluation and timeout performed  Spinal Block Prep:  Patient Position:sitting  Sterile Tech:cap, gloves, mask and sterile barriers  Prep:Betadine  Patient Monitoring:blood pressure monitoring, continuous pulse oximetry and EKG  Spinal Block Procedure  Approach:midline  Guidance:landmark technique  Location:L3-L4  Needle Type:Pencan  Needle Gauge:24 G  Placement of Spinal needle event:cerebrospinal fluid aspirated  Paresthesia: no  Fluid Appearance:clear  Medications: bupivacaine PF (MARCAINE) 0.75 % injection, 2 mL  Med Administered at 8/23/2021 7:23 AM   Post Assessment  Patient Tolerance:patient tolerated the procedure well with no apparent complications  Complications no

## 2021-08-23 NOTE — ANESTHESIA POSTPROCEDURE EVALUATION
Patient: Ann Marie Malik    Procedure Summary     Date: 08/23/21 Room / Location: [facility] OR  / [facility] OR    Anesthesia Start: 0706 Anesthesia Stop: 0920    Procedure: RIGHT TOTAL KNEE ARTHROPLASTY  with adductor canal block (Right ) Diagnosis:       Chronic pain of right knee      Internal derangement of right knee      Primary osteoarthritis of right knee      Chondromalacia of right knee      Synovial cyst of right popliteal space      Complex tear of lateral meniscus of right knee as current injury, subsequent encounter      (Chronic pain of right knee [M25.561, G89.29])      (Internal derangement of right knee [M23.91])      (Primary osteoarthritis of right knee [M17.11])      (Chondromalacia of right knee [M94.261])      (Synovial cyst of right popliteal space [M71.21])      (Complex tear of lateral meniscus of right knee as current injury, subsequent encounter [S83.900D])    Surgeons: Chris Covington MD Provider: Delvis Vargas MD    Anesthesia Type: spinal ASA Status: 3          Anesthesia Type: spinal    Vitals  Vitals Value Taken Time   /59 08/23/21 0916   Temp 97.9 °F (36.6 °C) 08/23/21 0916   Pulse 70 08/23/21 0916   Resp 16 08/23/21 0916   SpO2 95 % 08/23/21 0916           Post Anesthesia Care and Evaluation    Patient location during evaluation: PACU  Patient participation: complete - patient participated  Level of consciousness: awake and awake and alert  Pain score: 0  Pain management: satisfactory to patient  Airway patency: patent  Anesthetic complications: No anesthetic complications  PONV Status: none  Cardiovascular status: acceptable and stable  Respiratory status: acceptable, room air and spontaneous ventilation  Hydration status: acceptable  Post Neuraxial Block status: No signs or symptoms of PDPH

## 2021-08-23 NOTE — ANESTHESIA PROCEDURE NOTES
Peripheral Block      Patient reassessed immediately prior to procedure    Patient location during procedure: OR  Start time: 8/23/2021 7:10 AM  Stop time: 8/23/2021 7:16 AM  Reason for block: at surgeon's request and post-op pain management  Performed by  CRNA: Zaira Quick, CRNA  Assisted by: Jany Tejada RN  Preanesthetic Checklist  Completed: patient identified, IV checked, site marked, risks and benefits discussed, surgical consent, monitors and equipment checked, pre-op evaluation and timeout performed  Prep:  Sterile barriers:cap, gloves, mask and sterile barriers  Prep: ChloraPrep  Patient monitoring: blood pressure monitoring, continuous pulse oximetry and EKG  Procedure  Sedation:yes    Guidance:ultrasound guided  ULTRASOUND INTERPRETATION.  Using ultrasound guidance a 22 G gauge needle was placed in close proximity to the femoral nerve, at which point, under ultrasound guidance anesthetic was injected in the area of the nerve and spread of the anesthesia was seen on ultrasound in close proximity thereto.  There were no abnormalities seen on ultrasound; a digital image was taken; and the patient tolerated the procedure with no complications. Images:still images obtained    Laterality:right  Block Type:adductor canal block  Injection Technique:single-shot  Needle Type:echogenic  Needle Gauge:22 G      Medications Used: bupivacaine (PF) (MARCAINE) 0.5 % injection, 30 mL  Med admintered at 8/23/2021 7:16 AM      Post Assessment  Injection Assessment: negative aspiration for heme, incremental injection and no paresthesia on injection  Patient Tolerance:comfortable throughout block  Complications:no

## 2021-08-23 NOTE — PLAN OF CARE
Goal Outcome Evaluation:  Plan of Care Reviewed With: patient           Outcome Summary: PT evaluation completed this date as co-eval with OT. Pt pleasant and cooperative throughout eval. Pt performing sit<>supine with CGA and sitting EOB WFL. Pt performing sit<>stand with CGA and ambulating 5ft x2 with FWW and CGA. Further mobility deferred at this time d/t drop in blood pressure and pt's dizziness. Pt demoing R knee AROM 7-77 deg. Pt would benefit from further skilled PT to increase functional mobility, endurance, strength, safety, balance, and to progress towards PLOF. Pt will need stair training prior to d/c. Upon d/c from acute care, recommend home with assist and OP PT.

## 2021-08-23 NOTE — THERAPY EVALUATION
Patient Name: Ann Marie Malik  : 1955    MRN: [id number]                              Today's Date: 2021       Admit Date: 2021    Visit Dx:     ICD-10-CM ICD-9-CM   1. Chronic pain of right knee  M25.561 719.46    G89.29 338.29   2. Internal derangement of right knee  M23.91 717.9   3. Primary osteoarthritis of right knee  M17.11 715.16   4. Chondromalacia of right knee  M94.261 717.7   5. Synovial cyst of right popliteal space  M71.21 727.51   6. Complex tear of lateral meniscus of right knee as current injury, subsequent encounter  S83.271D V58.89   7. Impaired mobility and activities of daily living  Z74.09 V49.89    Z78.9    8. Impaired functional mobility, balance, gait, and endurance  Z74.09 V49.89     Patient Active Problem List   Diagnosis   • Chronic pain of right knee   • Primary osteoarthritis of right knee   • Internal derangement of right knee   • Degenerative tear of lateral meniscus of right knee   • Chondromalacia of right knee   • Synovial cyst of right popliteal space   • Cerebrovascular accident (CVA) (CMS/[facility])   • Complex tear of lateral meniscus of right knee as current injury     Past Medical History:   Diagnosis Date   • GERD (gastroesophageal reflux disease)    • Hyperlipidemia    • Hypertension    • Primary osteoarthritis of right knee 3/8/2020   • Stroke (CMS/[facility])      Past Surgical History:   Procedure Laterality Date   • TUBAL ABDOMINAL LIGATION       General Information     Row Name 21 1223          Physical Therapy Time and Intention    Document Type  evaluation  -KW     Mode of Treatment  co-treatment;occupational therapy;physical therapy  -KW     Row Name 21 1223          General Information    Patient Profile Reviewed  yes  -KW     Prior Level of Function  independent:;community mobility;gait;transfer;ADL's;driving  -KW     Existing Precautions/Restrictions  fall  -KW     Row Name 21 1223          Living Environment    Lives With  spouse   -KW     Row Name 08/23/21 1223          Home Main Entrance    Number of Stairs, Main Entrance  six  -KW     Stair Railings, Main Entrance  railings on both sides of stairs  -KW     Row Name 08/23/21 1223          Stairs Within Home, Primary    Stairs, Within Home, Primary  Walk-in shower - Has SPC and RW  -KW     Number of Stairs, Within Home, Primary  none  -KW     Stair Railings, Within Home, Primary  none  -KW     Row Name 08/23/21 1223          Cognition    Orientation Status (Cognition)  oriented x 4  -KW     Row Name 08/23/21 1223          Safety Issues, Functional Mobility    Impairments Affecting Function (Mobility)  pain;strength;endurance/activity tolerance;balance  -KW       User Key  (r) = Recorded By, (t) = Taken By, (c) = Cosigned By    Initials Name Provider Type    KW Moon Fitzgerald, CARLA Physical Therapist        Mobility     Row Name 08/23/21 1229          Bed Mobility    Bed Mobility  supine-sit;sit-supine  -KW     Supine-Sit [place] (Bed Mobility)  contact guard  -KW     Sit-Supine [place] (Bed Mobility)  contact guard  -KW     Assistive Device (Bed Mobility)  bed rails;head of bed elevated  -KW     Row Name 08/23/21 1229          Sit-Stand Transfer    Sit-Stand [place] (Transfers)  contact guard  -KW     Assistive Device (Sit-Stand Transfers)  walker, front-wheeled  -KW     Row Name 08/23/21 1229          Gait/Stairs (Locomotion)    [place] Level (Gait)  contact guard  -KW     Assistive Device (Gait)  walker, front-wheeled  -KW     Distance in Feet (Gait)  5ft x2  -KW     Deviations/Abnormal Patterns (Gait)  antalgic  -KW     Row Name 08/23/21 1229          Mobility    Extremity Weight-bearing Status  right lower extremity  -KW     Right Lower Extremity (Weight-bearing Status)  weight-bearing as tolerated (WBAT)  -KW       User Key  (r) = Recorded By, (t) = Taken By, (c) = Cosigned By    Initials Name Provider Type    Moon Mcdonald, CARLA Physical Therapist         Obj/Interventions     Row Name 08/23/21 1544          Range of Motion Comprehensive    Comment, General Range of Motion  R knee AROM 7-77 deg; otherwsie BLE AROM WFL  -KW     Row Name 08/23/21 1544          Strength Comprehensive (MMT)    Comment, General Manual Muscle Testing (MMT) Assessment  LLE grossly 4/5; R ankle and hip grossly 3+/5 based on movement demonstrated but not formally assessed d/t R TKA  -KW     Row Name 08/23/21 1544          Balance    Balance Assessment  sitting static balance  -KW     Static Sitting Balance  WFL;unsupported;sitting, edge of bed  -KW     [facility] Name 08/23/21 1544          Sensory Assessment (Somatosensory)    Sensory Assessment (Somatosensory)  LE sensation intact BLE light touch assessed  -KW       User Key  (r) = Recorded By, (t) = Taken By, (c) = Cosigned By    Initials Name Provider Type    Moon Mcdonald, PT Physical Therapist        Goals/Plan     [facility] Name 08/23/21 1546          Bed Mobility Goal 1 (PT)    Activity/Assistive Device (Bed Mobility Goal 1, PT)  sit to supine;supine to sit  -KW     [place] Level/Cues Needed (Bed Mobility Goal 1, PT)  independent  -KW     Time Frame (Bed Mobility Goal 1, PT)  3 days  -KW     Progress/Outcomes (Bed Mobility Goal 1, PT)  goal not met  -KW     [facility] Name 08/23/21 1546          Transfer Goal 1 (PT)    Activity/Assistive Device (Transfer Goal 1, PT)  sit-to-stand/stand-to-sit;bed-to-chair/chair-to-bed;walker, rolling  -KW     [place] Level/Cues Needed (Transfer Goal 1, PT)  modified independence  -KW     Time Frame (Transfer Goal 1, PT)  3 days  -KW     Progress/Outcome (Transfer Goal 1, PT)  goal not met  -KW     [facility] Name 08/23/21 1546          Gait Training Goal 1 (PT)    Activity/Assistive Device (Gait Training Goal 1, PT)  gait (walking locomotion);assistive device use;decrease fall risk;increase endurance/gait distance;walker, rolling  -KW     [place] Level (Gait Training Goal 1, PT)  modified independence  -KW      Distance (Gait Training Goal 1, PT)  50ft or more  -KW     Time Frame (Gait Training Goal 1, PT)  by discharge  -KW     Progress/Outcome (Gait Training Goal 1, PT)  goal not met  -KW     Row Name 08/23/21 1546          Stairs Goal 1 (PT)    Activity/Assistive Device (Stairs Goal 1, PT)  ascending stairs;descending stairs;using handrail, left;using handrail, right  -KW     [place] Level/Cues Needed (Stairs Goal 1, PT)  supervision required  -KW     Number of Stairs (Stairs Goal 1, PT)  6 or more  -KW     Time Frame (Stairs Goal 1, PT)  by discharge  -KW     Progress/Outcome (Stairs Goal 1, PT)  goal not met  -KW       User Key  (r) = Recorded By, (t) = Taken By, (c) = Cosigned By    Initials Name Provider Type    KW Moon Fitzgerald, PT Physical Therapist        Clinical Impression     Row Name 08/23/21 1224          Pain    Additional Documentation  Pain Scale: Numbers Pre/Post-Treatment (Group)  -KW     Row Name 08/23/21 1224          Pain Scale: Numbers Pre/Post-Treatment    Pretreatment Pain Rating  2/10  -KW     Posttreatment Pain Rating  3/10  -KW     Pain Location - Side  Right  -KW     Pain Location  knee  -KW     Row Name 08/23/21 1224          Plan of Care Review    Plan of Care Reviewed With  patient  -[facility] Name 08/23/21 1224          Therapy Assessment/Plan (PT)    Rehab Potential (PT)  good, to achieve stated therapy goals  -KW     Criteria for Skilled Interventions Met (PT)  yes;meets criteria  -KW     Predicted Duration of Therapy Intervention (PT)  2-3 days  -KW     Row Name 08/23/21 1224          Vital Signs    Pre Systolic BP Rehab  162  -KW     Pre Treatment Diastolic BP  71  -KW     Intra Systolic BP Rehab  131  -KW     Intra Treatment Diastolic BP  70  -KW     Post Systolic BP Rehab  132  -KW     Post Treatment Diastolic BP  94  -KW     Pretreatment Heart Rate (beats/min)  69  -KW     Intratreatment Heart Rate (beats/min)  61  -KW     Posttreatment Heart Rate (beats/min)  64  -KW      Pre SpO2 (%)  96  -KW     O2 Delivery Pre Treatment  room air  -KW     Post SpO2 (%)  97  -KW     O2 Delivery Post Treatment  room air  -KW     Pre Patient Position  Supine  -KW     Intra Patient Position  Sitting post ambulation  -KW     Post Patient Position  Supine  -KW     Row Name 08/23/21 1224          Positioning and Restraints    Pre-Treatment Position  in bed  -KW     Post Treatment Position  bed  -KW     In Bed  fowlers;call light within reach;encouraged to call for assist;exit alarm on;side rails up x2  -KW       User Key  (r) = Recorded By, (t) = Taken By, (c) = Cosigned By    Initials Name Provider Type    Moon Mcdonald PT Physical Therapist        Outcome Measures     Row Name 08/23/21 1547          How much help from another person do you currently need...    Turning from your back to your side while in flat bed without using bedrails?  3  -KW     Moving from lying on back to sitting on the side of a flat bed without bedrails?  3  -KW     Moving to and from a bed to a chair (including a wheelchair)?  3  -KW     Standing up from a chair using your arms (e.g., wheelchair, bedside chair)?  3  -KW     Climbing 3-5 steps with a railing?  2  -KW     To walk in hospital room?  3  -KW     AM-PAC 6 Clicks Score (PT)  17  -KW     Row Name 08/23/21 1547 08/23/21 1210       Functional Assessment    Outcome Measure Options  AM-PAC 6 Clicks Basic Mobility (PT)  -KW  AM-PAC 6 Clicks Daily Activity (OT)  -      User Key  (r) = Recorded By, (t) = Taken By, (c) = Cosigned By    Initials Name Provider Type    Moon Mcdonald, CARLA Physical Therapist    Rafy Tobar OT Occupational Therapist                       Physical Therapy Education                 Title: PT OT SLP Therapies (In Progress)     Topic: Physical Therapy (In Progress)     Point: Mobility training (Not Started)     Learner Progress:  Not documented in this visit.          Point: Home exercise program (Not Started)     Learner Progress:  Not  documented in this visit.          Point: Body mechanics (Done)     Learning Progress Summary           Patient Acceptance, E, VU by  at 8/23/2021 1547    Comment: Role of PT, POC, use of gait belt, use of AD, WBAT                   Point: Precautions (Done)     Learning Progress Summary           Patient Acceptance, E, VU by  at 8/23/2021 1547    Comment: Role of PT, POC, use of gait belt, use of AD, WBAT                               User Key     Initials Effective Dates Name Provider Type Discipline     06/16/21 -  Moon Fitzgerald PT Physical Therapist PT              PT Recommendation and Plan  Planned Therapy Interventions (PT): balance training, bed mobility training, gait training, home exercise program, transfer training, patient/family education, stretching, strengthening, stair training  Plan of Care Reviewed With: patient  Outcome Summary: PT evaluation completed this date as co-eval with OT. Pt pleasant and cooperative throughout eval. Pt performing sit<>supine with CGA and sitting EOB WFL. Pt performing sit<>stand with CGA and ambulating 5ft x2 with FWW and CGA. Pt demoing R knee AROM 7-77 deg. Pt would benefit from further skilled PT to increase functional mobility, endurance, strength, safety, balance, and to progress towards PLOF. Pt will need stair training prior to d/c. Upon d/c from acute care, recommend home with assist and OP PT.     Time Calculation:   PT Charges     Row Name 08/23/21 1550             Time Calculation    Start Time  1215  -KW      Stop Time  1253  -KW      Time Calculation (min)  38 min  -KW      PT Received On  08/23/21  -KW      PT Goal Re-Cert Due Date  09/05/21  -KW        User Key  (r) = Recorded By, (t) = Taken By, (c) = Cosigned By    Initials Name Provider Type     Moon Fitzgerald, PT Physical Therapist        Therapy Charges for Today     Code Description Service Date Service Provider Modifiers Qty    72794492052 HC PT EVAL MOD COMPLEXITY 3 8/23/2021 Amilcar  Moon, PT GP 1          PT G-Codes  Outcome Measure Options: AM-PAC 6 Clicks Basic Mobility (PT)  AM-PAC 6 Clicks Score (PT): 17  AM-PAC 6 Clicks Score (OT): 20    Moon Fitzgerald PT  8/23/2021

## 2021-08-23 NOTE — ANESTHESIA PREPROCEDURE EVALUATION
Anesthesia Evaluation     Patient summary reviewed and Nursing notes reviewed   no history of anesthetic complications:  NPO Solid Status: > 8 hours  NPO Liquid Status: > 8 hours           Airway   Mallampati: II  TM distance: >3 FB  Neck ROM: full  possible difficult intubation  Dental    (+) poor dentation    Pulmonary - normal exam    breath sounds clear to auscultation  (+) a smoker Former,   Cardiovascular - normal exam    ECG reviewed  Rhythm: regular  Rate: normal    (+) hypertension, hyperlipidemia,   (-) murmur    ROS comment: Sinus rhythm with Premature atrial complexes  Otherwise normal ECG  When compared with ECG of 27-JUN-2020 08:00,  Premature atrial complexes are now Present  Nonspecific T wave abnormality no longer evident in Lateral leads     Referred By:            Confirmed By: SATISH LU MD· Estimated EF = 67%.  · Left ventricular systolic function is normal.  · Left ventricular diastolic dysfunction (grade I) consistent with impaired relaxation.  · Mild tricuspid valve regurgitation is present.  · [place] contrast bubble study positive for PFO. No definite ASD noted.    Neuro/Psych- negative ROS  GI/Hepatic/Renal/Endo    (+)  GERD well controlled,  liver disease history of elevated LFT,     Musculoskeletal     Abdominal    Substance History - negative use     OB/GYN negative ob/gyn ROS         Other   arthritis,                      Anesthesia Plan    ASA 3     spinal   (Discussed peripheral nerve block(adductor canal) for post op pain relief and patient understands possible complications,risks and agrees.)  intravenous induction     Anesthetic plan, all risks, benefits, and alternatives have been provided, discussed and informed consent has been obtained with: patient and spouse/significant other.

## 2021-08-23 NOTE — OP NOTE
TOTAL KNEE ARTHROPLASTY ATTUNE  Procedure Note    Name:    Ann Marie Malik  YOB: 1955  Date of surgery:   8/23/2021    Pre-op Diagnosis:   Chronic pain of right knee [M25.561, G89.29]  Internal derangement of right knee [M23.91]  Primary osteoarthritis of right knee [M17.11]  Chondromalacia of right knee [M94.261]  Synovial cyst of right popliteal space [M71.21]  Complex tear of lateral meniscus of right knee as current injury, subsequent encounter [S83.271D]    Post-op Diagnosis:    Post-Op Diagnosis Codes:     * Chronic pain of right knee [M25.561, G89.29]     * Internal derangement of right knee [M23.91]     * Primary osteoarthritis of right knee [M17.11]     * Chondromalacia of right knee [M94.261]     * Synovial cyst of right popliteal space [M71.21]     * Complex tear of lateral meniscus of right knee as current injury, subsequent encounter [S83.271D]    Procedure:  Procedure(s):  RIGHT TOTAL KNEE ARTHROPLASTY  with adductor canal block    Surgeon(s):  Chris Covington MD    SURGEON: Chris Covington MD    Assistant: Nayely Sood CSA was responsible for performing the following activities: Retraction, Suction, Irrigation, Suturing, Closing and Placing Dressing and their skilled assistance was necessary for the success of this case.     Anesthesia: Spinal    Staff:   Circulator: Jany Tejada RN  Scrub Person: Joy Yeboah  Vendor Representative: Byron Dobson  Assistant: Nayely Sood CSA  Other: Ben Zelaya    Estimated Blood Loss: 100ml    Specimens:                ID Type Source Tests Collected by Time   A (Not marked as sent) : bone and soft tissue right knee Tissue Knee, Right TISSUE PATHOLOGY EXAM Chris Covington MD 8/23/2021 0754         Drains:   Closed/Suction Drain 1 Right Knee Accordion 10 Fr. (Active)       Findings:  End-stage osteoarthritis Right knee    Complications: None    IMPLANTS:   Implant Name Type Inv. Item Serial  No.  Lot No. LRB No. Used Action   CMT BONE SMARTSET HV 40GM - [id number] Implant CMT BONE SMARTSET HV 40GM  DEPUY [id number] Right 1 Implanted   PIN ATTUNE INTUITION NOHD THRD PK/6 - [id number] Implant PIN ATTUNE INTUITION NOHD THRD PK/6  DEPUY [id number] Right 1 Implanted   COMP FEM ATTUNE CMT PS NRW SZ5 RT - [id number] Implant COMP FEM ATTUNE CMT PS NRW SZ5 RT  DEPUY [id number] Right 1 Implanted   BASE TIB ATTUNE CMT RP S PLS SZ5 - [id number] Implant BASE TIB ATTUNE CMT RP S PLS SZ5  DEPUY [id number] Right 1 Implanted   COMP PAT ATTUNE CMT MEDL/DOME 32MM - [id number] Implant COMP PAT ATTUNE CMT MEDL/DOME 32MM  DEPUY [id number] Right 1 Implanted   INSRT TIB ATTUNE PS RP SZ5 6MM - [id number] Implant INSRT TIB ATTUNE PS RP SZ5 6MM  DEPUY [id number] Right 1 Implanted         PROCEDURE:    The patient was taken to the operating room and placed in the supine position. Preoperative antibiotics were administered. Surgical time out was performed. After adequate induction of anesthesia, the leg was prepped and draped in the usual sterile fashion, exsanguinated with an Ace bandage and the tourniquet inflated to 300 mmHg. A midline incision was performed followed by a medial parapatellar arthrotomy.    Attention was then placed to the patella. The patella was noted to track centrally through range of motion. The patella was then sized with the trials. The thickness of the patella was then measured and the cut was made in a free-hand technique. The patella protector was then placed and the surrounding osteophytes were removed.   The patella was subluxed laterally in a non-everted position.  A portion of the fat pad, ACL, and anterior horns of the meniscus were excised.     The drill hole was placed in the distal femur and the canal was then irrigated and suctioned. The IM padmaja was placed and a 5 degree distal valgus cut was performed on the femur. The femur was then sized with a sizing guide. The femoral cutting block was placed and  all femoral cuts were performed. The proximal tibia was exposed. We used the extramedullary tibial cutting guide set for removal of an appropriate amount of proximal tibia. The tibial cut was performed. The posterior horns of the menisci were excised. The posterior osteophytes were removed. Flexion and extension blocks were then used to balance the knee. The tibial cut surface was then sized with the sizing templates and the tibial and femoral trial were then placed. The knee was placed in full extension and then the patella was re-addressed. The patella was resurfaced and the preoperative thickness was reproduced. The patella tracked centrally through range of motion.     At this point all trial components were removed, the knee was copiously irrigated with pulsed lavage. The cut surfaces were then dried with clean lap sponges, and the components were cemented; tibia, followed by femur, then patella. The knee was held in full extension and all excess cement was removed. The knee was held still until the cement had completely hardened. We then placed the trial polyethylene spacer which resulted in full extension and excellent flexion-extension balance. We placed the final polyethylene spacer.    The knee was then copiously irrigated. The tourniquet was then released. There was excellent hemostasis. We placed a 10 Welsh Hemovac drain. We closed the knee in multiple layers in standard fashion. Sterile dressings were applied. At the end of the case, the sponge and needle counts were reported as being correct. There were no known complications. The patient was then transported to the recovery room in satisfactory condition.      Chris Covington MD     Date: 8/23/2021  Time: 09:28 CDT

## 2021-08-23 NOTE — DISCHARGE INSTR - APPOINTMENTS
[facility] Outpatient Therapy:  Date: 08/25/2021  Time: 9:30 AM  Address: [address] [place] [address]. [id number]  Telephone: [phone]

## 2021-08-23 NOTE — INTERVAL H&P NOTE
H&P reviewed. The patient was examined and there are no changes to the H&P.    Vitals:    08/23/21 0603   BP: 178/95   Pulse: 73   Resp: 18   Temp: 96.9 °F (36.1 °C)   SpO2: 96%     Electronically signed by Chris Covington MD on 08/23/21 at 06:34 CDT

## 2021-08-23 NOTE — CASE MANAGEMENT/SOCIAL WORK
Discharge Planning Assessment  [facility]     Patient Name: Ann Marie Malik  MRN: [id number]  Today's Date: 8/23/2021    Admit Date: 8/23/2021    Discharge Needs Assessment     Row Name 08/23/21 1145       Living Environment    Lives With  spouse    Current Living Arrangements  home/apartment/condo    Primary Care Provided by  self    Provides Primary Care For  no one    Family Caregiver if Needed  spouse    Quality of Family Relationships  helpful;involved;supportive    Able to Return to Prior Arrangements  no       Resource/Environmental Concerns    Resource/Environmental Concerns  none    Transportation Concerns  car, none       Transition Planning    Patient/Family Anticipates Transition to  home with family    Patient/Family Anticipated Services at Transition  outpatient care    Transportation Anticipated  family or friend will provide       Discharge Needs Assessment    Readmission Within the Last 30 Days  no previous admission in last 30 days    Equipment Currently Used at Home  walker, rolling;cane, straight    Concerns to be Addressed  no discharge needs identified    Anticipated Changes Related to Illness  none    Equipment Needed After Discharge  none    Outpatient/Agency/Support Group Needs  outpatient therapy    Discharge Facility/Level of Care Needs  outpatient therapy    Patient's Choice of Community Agency(s)  [facility] Outpatient Therapy    Current Discharge Risk  chronically ill        Discharge Plan     Row Name 08/23/21 1152       Plan    Plan Comments  Consult and LACE complete.  Pharmacy and coverage confirmed.  Patient has a straight cane and RW at home.  No DME needed.  Patient was independent prior to hospitalization and reports a good support system.  Patient chose [facility] Outpatient Therapy.  SW scheduled appointment and added it to patient's AVS... LIZETH Pedro        Continued Care and Services - Admitted Since 8/23/2021    Coordination has not been started  for this encounter.         Demographic Summary     Row Name 08/23/21 1148       General Information    Admission Type  same day    Arrived From  operating room    Referral Source  high risk screening    Reason for Consult  discharge planning    Preferred Language  English     Used During This Interaction  no        Functional Status     Row Name 08/23/21 1145       Functional Status    Usual Activity Tolerance  good    Current Activity Tolerance  moderate       Functional Status, IADL    Medications  independent    Meal Preparation  independent    Housekeeping  independent    Laundry  independent    Shopping  independent       Mental Status    General Appearance WDL  WDL       Mental Status Summary    Recent Changes in Mental Status/Cognitive Functioning  no changes       Employment/    Employment Status  retired        Psychosocial    No documentation.       Abuse/Neglect    No documentation.       Legal    No documentation.       Substance Abuse    No documentation.       Patient Forms    No documentation.           LIZETH Pedro

## 2021-08-23 NOTE — PLAN OF CARE
Goal Outcome Evaluation:  Plan of Care Reviewed With: patient           Outcome Summary: OT Eval completed on this date as co-eval with PT. Pt pleasand and agreeable to therapy. Bed Mobility: CGA Transfers: CGA with use of RW Funct Mob:CGA with use of RW LBD: Min A to don/doff pants Grooming: Supervision with set-up. Pt with complaints of nausea and light headiness once ambulating and sitting on toilet. Pt noted to have drop in systolic blood pressure - RN aware. Pt demos decreased activity tolerance, decreased strength, decreased balance, decreased safety awareness, and increased pain with increased activity. Pt would benefit from continued skilled OT to address functional deficits. Recommend d/c home with assist.

## 2021-08-24 VITALS
RESPIRATION RATE: 18 BRPM | HEIGHT: 67 IN | BODY MASS INDEX: 28.75 KG/M2 | TEMPERATURE: 96.2 F | DIASTOLIC BLOOD PRESSURE: 56 MMHG | HEART RATE: 72 BPM | OXYGEN SATURATION: 90 % | SYSTOLIC BLOOD PRESSURE: 125 MMHG | WEIGHT: 183.2 LBS

## 2021-08-24 LAB
LAB AP CASE REPORT: NORMAL
PATH REPORT.FINAL DX SPEC: NORMAL

## 2021-08-24 PROCEDURE — 97116 GAIT TRAINING THERAPY: CPT

## 2021-08-24 PROCEDURE — 99024 POSTOP FOLLOW-UP VISIT: CPT | Performed by: ORTHOPAEDIC SURGERY

## 2021-08-24 PROCEDURE — 97110 THERAPEUTIC EXERCISES: CPT

## 2021-08-24 RX ORDER — OXYCODONE AND ACETAMINOPHEN 7.5; 325 MG/1; MG/1
1 TABLET ORAL EVERY 6 HOURS PRN
Qty: 30 TABLET | Refills: 0 | Status: SHIPPED | OUTPATIENT
Start: 2021-08-24 | End: 2021-09-07 | Stop reason: SDUPTHER

## 2021-08-24 RX ORDER — ASPIRIN 81 MG/1
81 TABLET, CHEWABLE ORAL 2 TIMES DAILY
Qty: 82 TABLET | Refills: 0 | Status: SHIPPED | OUTPATIENT
Start: 2021-08-24 | End: 2021-08-30 | Stop reason: HOSPADM

## 2021-08-24 RX ADMIN — VALSARTAN 160 MG: 160 TABLET, FILM COATED ORAL at 08:30

## 2021-08-24 RX ADMIN — OXYCODONE 5 MG: 5 TABLET ORAL at 04:53

## 2021-08-24 RX ADMIN — HYDROCHLOROTHIAZIDE 25 MG: 25 TABLET ORAL at 08:27

## 2021-08-24 RX ADMIN — OXYCODONE 5 MG: 5 TABLET ORAL at 10:03

## 2021-08-24 RX ADMIN — FERROUS SULFATE TAB EC 324 MG (65 MG FE EQUIVALENT) 324 MG: 324 (65 FE) TABLET DELAYED RESPONSE at 08:27

## 2021-08-24 RX ADMIN — ACETAMINOPHEN 1000 MG: 500 TABLET, FILM COATED ORAL at 08:27

## 2021-08-24 RX ADMIN — SODIUM CHLORIDE, PRESERVATIVE FREE 3 ML: 5 INJECTION INTRAVENOUS at 08:27

## 2021-08-24 RX ADMIN — FAMOTIDINE 40 MG: 40 TABLET ORAL at 08:27

## 2021-08-24 RX ADMIN — DOCUSATE SODIUM 50 MG AND SENNOSIDES 8.6 MG 2 TABLET: 8.6; 5 TABLET, FILM COATED ORAL at 08:27

## 2021-08-24 RX ADMIN — OXYCODONE HYDROCHLORIDE 10 MG: 10 TABLET, FILM COATED, EXTENDED RELEASE ORAL at 08:27

## 2021-08-24 RX ADMIN — ASPIRIN 81 MG: 81 TABLET, CHEWABLE ORAL at 08:27

## 2021-08-24 NOTE — THERAPY TREATMENT NOTE
Acute Care - Physical Therapy Treatment Note  [facility]     Patient Name: Ann Marie Malik  : 1955  MRN: [id number]  Today's Date: 2021      Visit Dx:     ICD-10-CM ICD-9-CM   1. Primary osteoarthritis of right knee  M17.11 715.16   2. Chronic pain of right knee  M25.561 719.46    G89.29 338.29   3. Internal derangement of right knee  M23.91 717.9   4. Chondromalacia of right knee  M94.261 717.7   5. Synovial cyst of right popliteal space  M71.21 727.51   6. Complex tear of lateral meniscus of right knee as current injury, subsequent encounter  S83.271D V58.89   7. Impaired mobility and activities of daily living  Z74.09 V49.89    Z78.9    8. Impaired functional mobility, balance, gait, and endurance  Z74.09 V49.89     Patient Active Problem List   Diagnosis   • Chronic pain of right knee   • Primary osteoarthritis of right knee   • Internal derangement of right knee   • Degenerative tear of lateral meniscus of right knee   • Chondromalacia of right knee   • Synovial cyst of right popliteal space   • Cerebrovascular accident (CVA) (CMS/[facility])   • Complex tear of lateral meniscus of right knee as current injury     Past Medical History:   Diagnosis Date   • GERD (gastroesophageal reflux disease)    • Hyperlipidemia    • Hypertension    • Primary osteoarthritis of right knee 3/8/2020   • Stroke (CMS/HCC)      Past Surgical History:   Procedure Laterality Date   • TUBAL ABDOMINAL LIGATION          PT Assessment (last 12 hours)      PT Evaluation and Treatment     Row Name 21 0891          Physical Therapy Time and Intention    Subjective Information  no complaints  -KEZIA     Document Type  therapy note (daily note)  -KEZIA     Mode of Treatment  physical therapy;individual therapy  -KEZIA     Patient Effort  excellent  -KEZIA     Row Name 2171          General Information    Patient Profile Reviewed  yes  -KEZIA     Existing Precautions/Restrictions  fall  -KEZIA     Row Name 21 0885           Cognition    Orientation Status (Cognition)  oriented x 4  -[facility] Name 08/24/21 0840          Pain Scale: Numbers Pre/Post-Treatment    Pretreatment Pain Rating  3/10  -     Posttreatment Pain Rating  3/10  -     Pain Location - Side  Right  -     Pain Location  knee  -JA     Row Name 08/24/21 0840          Pain Scale: Word Pre/Post-Treatment    Pain Intervention(s)  Medication (See MAR);Repositioned;Ambulation/increased activity  -JA     Row Name 08/24/21 0840          Range of Motion (ROM)    Range of Motion  right lower extremity  -     Right Lower Extremity (ROM)  knee  -     Knee, Right (Range of Motion)  11-84 degrees  -JA     Row Name 08/24/21 0840          Mobility    Extremity Weight-bearing Status  right lower extremity  -     Right Lower Extremity (Weight-bearing Status)  weight-bearing as tolerated (WBAT)  -JA     Row Name 08/24/21 0840          Bed Mobility    Bed Mobility  supine-sit;sit-supine  -     Supine-Sit [place] (Bed Mobility)  modified independence  -     Sit-Supine [place] (Bed Mobility)  modified independence  -     Assistive Device (Bed Mobility)  head of bed elevated;bed rails  -JA     Row Name 08/24/21 0840          Transfers    Transfers  sit-stand transfer;stand-sit transfer  -     Sit-Stand [place] (Transfers)  standby assist  [facility]     Stand-Sit [place] (Transfers)  standby assist  -JA     Row Name 08/24/21 0840          Sit-Stand Transfer    Assistive Device (Sit-Stand Transfers)  walker, front-wheeled  -JA     Row Name 08/24/21 0840          Stand-Sit Transfer    Assistive Device (Stand-Sit Transfers)  walker, front-wheeled  -JA     Row Name 08/24/21 0840          Gait/Stairs (Locomotion)    [place] Level (Gait)  standby assist  -     Assistive Device (Gait)  walker, front-wheeled  -     Distance in Feet (Gait)  120'  -     Deviations/Abnormal Patterns (Gait)  antalgic  -     [place] Level (Stairs)  stand by assist  -      Handrail Location (Stairs)  both sides  -     Number of Steps (Stairs)  5   -JA     Ascending Technique (Stairs)  step-to-step  -     Descending Technique (Stairs)  step-to-step  -     Row Name 08/24/21 0840          Safety Issues, Functional Mobility    Impairments Affecting Function (Mobility)  pain;strength;endurance/activity tolerance;balance  -     Row Name 08/24/21 0840          Motor Skills    Therapeutic Exercise  hip;knee;ankle  -     Row Name 08/24/21 0840          Hip (Therapeutic Exercise)    Hip (Therapeutic Exercise)  AAROM (active assistive range of motion);isometric exercises  -     Hip AAROM (Therapeutic Exercise)  aBduction;aDduction  -     Hip Isometrics (Therapeutic Exercise)  gluteal sets  -     Row Name 08/24/21 0840          Knee (Therapeutic Exercise)    Knee (Therapeutic Exercise)  AAROM (active assistive range of motion);isometric exercises;AROM (active range of motion)  -     Knee AROM (Therapeutic Exercise)  right;flexion  -     Knee AAROM (Therapeutic Exercise)  right;other (see comments) SLR  -     Knee Isometrics (Therapeutic Exercise)  quad sets  -     Row Name 08/24/21 0840          Ankle (Therapeutic Exercise)    Ankle (Therapeutic Exercise)  AROM (active range of motion)  -     Ankle AROM (Therapeutic Exercise)  bilateral;dorsiflexion;plantarflexion  -     Row Name             Wound 08/23/21 0755 Right knee Incision    Wound - Properties Group Placement Date: 08/23/21  -KW Placement Time: 0755  -KW Present on Hospital Admission: N  -KW Side: Right  -KW Location: knee  -KW Primary Wound Type: Incision  -KW    Retired Wound - Properties Group Date first assessed: 08/23/21  -KW Time first assessed: 0755  -KW Present on Hospital Admission: N  -KW Side: Right  -KW Location: knee  -KW Primary Wound Type: Incision  -KW    Row Name 08/24/21 0840          Vital Signs    Pre Systolic BP Rehab  112  -JA     Pre Treatment Diastolic BP  57  -JA     Post Systolic  BP Rehab  135  -     Post Treatment Diastolic BP  69  -     Pretreatment Heart Rate (beats/min)  70  -     Posttreatment Heart Rate (beats/min)  70  -     Pre Patient Position  Supine  -     Intra Patient Position  Standing  -     Post Patient Position  Supine  -     Row Name 08/24/21 0840          Positioning and Restraints    Pre-Treatment Position  in bed  -     Post Treatment Position  bed  -     In Bed  supine;call light within reach;encouraged to call for assist;exit alarm on  -     Row Name 08/24/21 0840          Progress Summary (PT)    Progress Toward Functional Goals (PT)  progress toward functional goals as expected  -       User Key  (r) = Recorded By, (t) = Taken By, (c) = Cosigned By    Initials Name Provider Type    Kevin Espinosa PTA Physical Therapy Assistant    Jany Gaytan RN Registered Nurse        Physical Therapy Education                 Title: PT OT SLP Therapies (Resolved)     Topic: Physical Therapy (Resolved)     Point: Mobility training (Resolved)     Learner Progress:  Not documented in this visit.          Point: Home exercise program (Resolved)     Learner Progress:  Not documented in this visit.          Point: Body mechanics (Resolved)     Learning Progress Summary           Patient Acceptance, E, VU by  at 8/23/2021 1547    Comment: Role of PT, POC, use of gait belt, use of AD, WBAT                   Point: Precautions (Resolved)     Learning Progress Summary           Patient Acceptance, E, VU by  at 8/23/2021 1547    Comment: Role of PT, POC, use of gait belt, use of AD, WBAT                               User Key     Initials Effective Dates Name Provider Type Discipline     06/16/21 -  Moon Fitzgerald, PT Physical Therapist PT              PT Recommendation and Plan  Anticipated Discharge Disposition (PT): home with outpatient therapy services  Progress Summary (PT)  Progress Toward Functional Goals (PT): progress toward functional goals  as expected       Time Calculation:   PT Charges     Row Name 08/24/21 1109             Time Calculation    Start Time  0840  -JA      Stop Time  0940  -JA      Time Calculation (min)  60 min  -JA         Time Calculation- PT    Total Timed Code Minutes- PT  60 minute(s)  -JA         Timed Charges    36503 - PT Therapeutic Exercise Minutes  30  -JA      61787 - Gait Training Minutes   30  -JA         Total Minutes    Timed Charges Total Minutes  60  -JA       Total Minutes  60  -JA        User Key  (r) = Recorded By, (t) = Taken By, (c) = Cosigned By    Initials Name Provider Type    Kevin Espinosa PTA Physical Therapy Assistant        Therapy Charges for Today     Code Description Service Date Service Provider Modifiers Qty    51468029569 HC PT THER PROC EA 15 MIN 8/24/2021 Kevin Beebe PTA GP 2    26668743551 HC GAIT TRAINING EA 15 MIN 8/24/2021 Kevin Beebe PTA GP 2          PT G-Codes  Outcome Measure Options: AM-PAC 6 Clicks Basic Mobility (PT)  AM-PAC 6 Clicks Score (PT): 17  AM-PAC 6 Clicks Score (OT): 20    Kevin Beebe PTA  8/24/2021

## 2021-08-24 NOTE — PLAN OF CARE
Goal Outcome Evaluation:           Progress: improving  Outcome Summary: pt c/o nausea at the start of shift, was controled with IV medication. Pain controled with oral meds. Pt rested quietly though the night. Will continue to monitor.

## 2021-08-24 NOTE — PROGRESS NOTES
Orthopedic Total Knee Progress Note      Patient: Ann Marie Malik  Date of Admission: 2021  YOB: 1955  Medical Record Number: [id number]    LOS: 1    Status Post: Procedure(s) (LRB):  RIGHT TOTAL KNEE ARTHROPLASTY  with adductor canal block (Right)        Systemic or Specific Complaints: No Complaints  Complications: None  Pain Relief: some relief    Physical Exam:  65 y.o. female alert and oriented  Temp:  [95.9 °F (35.5 °C)-98.1 °F (36.7 °C)] 96.2 °F (35.7 °C)  Heart Rate:  [59-76] 72  Resp:  [16-18] 18  BP: (101-140)/(56-76) 125/56  Temp (24hrs), Av °F (36.1 °C), Min:95.9 °F (35.5 °C), Max:98.1 °F (36.7 °C)        Abd: Soft, NT, with BS +  Ext: NV intact. ROM appropriate. Calf is soft and nontender. Negative Homans Sign.  Skin: dressing clean, dry, and intact w/out signs or  symptoms of infection.    Activity: Mobilizing Per P.T.   Weight Bearing: As Tolerated    Data Review  Admission on 2021   Component Date Value Ref Range Status   • ABO Type 2021 O   Final   • RH type 2021 Positive   Final   • Antibody Screen 2021 Negative   Final   • T&S Expiration Date 2021 11:59:59 PM   Final   • Previous History 2021 Previous Record on File   Final   • Hemoglobin 2021 12.1  12.0 - 15.9 g/dL Final   • Hematocrit 2021 36.9  34.0 - 46.6 % Final       No results found.    Medications  acetaminophen, 1,000 mg, Oral, 4x Daily  aspirin, 81 mg, Oral, BID  atorvastatin, 40 mg, Oral, Nightly  famotidine, 40 mg, Oral, Daily  ferrous sulfate, 324 mg, Oral, Daily With Breakfast  hydroCHLOROthiazide, 25 mg, Oral, Daily  oxyCODONE, 10 mg, Oral, Q12H  senna-docusate sodium, 2 tablet, Oral, BID  sodium chloride, 3 mL, Intravenous, Q12H  valsartan, 160 mg, Oral, Daily      albuterol  •  electrolyte-A  •  HYDROmorphone **AND** naloxone  •  ondansetron **OR** ondansetron  •  oxyCODONE  •  sodium chloride    Assessment:  Doing well following total knee  replacement  Patient Active Problem List   Diagnosis   • Chronic pain of right knee   • Primary osteoarthritis of right knee   • Internal derangement of right knee   • Degenerative tear of lateral meniscus of right knee   • Chondromalacia of right knee   • Synovial cyst of right popliteal space   • Cerebrovascular accident (CVA) (CMS/[facility])   • Complex tear of lateral meniscus of right knee as current injury         Plan:   Continue efforts to mobilize  Continue Pain Control Measures  Continue incisional Care  DVT prophylaxis    Discharge Plan:Home today anticipated as long as mobility and pain control is managed.    Chris Covington MD    Date: 8/24/2021   Time: 07:32 CDT

## 2021-08-25 ENCOUNTER — HOSPITAL ENCOUNTER (OUTPATIENT)
Dept: PHYSICAL THERAPY | Facility: HOSPITAL | Age: 66
Setting detail: THERAPIES SERIES
Discharge: HOME | End: 2021-08-25

## 2021-08-25 DIAGNOSIS — M71.21 SYNOVIAL CYST OF RIGHT POPLITEAL SPACE: ICD-10-CM

## 2021-08-25 DIAGNOSIS — M17.11 PRIMARY OSTEOARTHRITIS OF RIGHT KNEE: Primary | ICD-10-CM

## 2021-08-25 PROCEDURE — G0283 ELEC STIM OTHER THAN WOUND: HCPCS | Performed by: PHYSICAL THERAPIST

## 2021-08-25 PROCEDURE — 97161 PT EVAL LOW COMPLEX 20 MIN: CPT | Performed by: PHYSICAL THERAPIST

## 2021-08-25 PROCEDURE — 97110 THERAPEUTIC EXERCISES: CPT | Performed by: PHYSICAL THERAPIST

## 2021-08-25 NOTE — THERAPY EVALUATION
Outpatient Physical Therapy Ortho Initial Evaluation  [facility]     Patient Name: Ann Marie Malik  : 1955  MRN: [id number]  Today's Date: 2021      Visit Date: 2021  Attendance:   (medicare)  Subjective % Improvement:n/a   Recert Date: 9/15/21  MD appointment: TBD- 10-14 days for suture removal    Therapy Diagnosis: TKA 21    Patient Active Problem List   Diagnosis   • Chronic pain of right knee   • Primary osteoarthritis of right knee   • Internal derangement of right knee   • Degenerative tear of lateral meniscus of right knee   • Chondromalacia of right knee   • Synovial cyst of right popliteal space   • Cerebrovascular accident (CVA) (CMS/HCC)   • Complex tear of lateral meniscus of right knee as current injury        Past Medical History:   Diagnosis Date   • GERD (gastroesophageal reflux disease)    • Hyperlipidemia    • Hypertension    • Primary osteoarthritis of right knee 3/8/2020   • Stroke (CMS/[facility])         Past Surgical History:   Procedure Laterality Date   • TUBAL ABDOMINAL LIGATION         Visit Dx:     ICD-10-CM ICD-9-CM   1. Primary osteoarthritis of right knee  M17.11 715.16   2. Synovial cyst of right popliteal space  M71.21 727.51         Patient History     Row Name 21 0938             History    Date Current Problem(s) Began  21  -BB      Brief Description of Current Complaint  Present today with right total knee replacement on 21. Reports no formal therapy prior to surgery. Reports sensation is returning. Spent 1 night in hospital. Lives with  who is present to assist as needed. Denies any significant incision drainage   -BB      Patient/Caregiver Goals  Return to prior level of function;Improve mobility  -BB      Patient's Rating of General Health  Very good  -BB      Hand Dominance  right-handed  -BB      Occupation/sports/leisure activities  retired   -BB         Pain     Pain Location  Knee  -BB      Pain at Present  5  medication @8:30  -BB      Pain at Worst  8 after sleeping through the night   -BB      Is your sleep disturbed?  Yes  -BB      Is medication used to assist with sleep?  Yes  -BB        User Key  (r) = Recorded By, (t) = Taken By, (c) = Cosigned By    Initials Name Provider Type    Adrienne Aranda PT DPT Physical Therapist          PT Ortho     Row Name 08/25/21 0936       Subjective Comments    Subjective Comments  see pt hx   -BB       Precautions and Contraindications    Precautions/Limitations  no known precautions/limitations  -BB    Precautions  Monitor BP PRN- hx of CVA without residual effects   -BB       Subjective Pain    Able to rate subjective pain?  yes  -BB    Pre-Treatment Pain Level  5  -BB       Posture/Observations    Posture/Observations Comments  Incision open to air, little to no drainage noted. No significant redness, Soft edema is mild and present   -BB       General ROM    GENERAL ROM COMMENTS  0-2-75  -BB       MMT (Manual Muscle Testing)    General MMT Comments  independent with SLR with significant quad lag.   -BB       Sensation    Sensation WNL?  WFL  -BB    Additional Comments  in tact to crude touch   -BB       Girth    Girth Measured?  Right Lower Extremity  -BB       RLE Quick Girth (cm)    Largest calf  38.5 cm  -BB    Tib tuberosity  39 cm  -BB    Mid patella  41 cm  -BB    Distal thigh  48.5 cm  -BB    RLE Quick Girth Total  167  -BB       Transfers    Comment (Transfers)  independent with increased time   -BB       Gait/Stairs (Locomotion)    Comment (Gait/Stairs)  Good gait pattern with walker used, does use slowed matt   -BB      User Key  (r) = Recorded By, (t) = Taken By, (c) = Cosigned By    Initials Name Provider Type    Adrienne Aranda PT DPJANESSA Physical Therapist                      Therapy Education  Education Details: QS, HS, SLR   Given: Symptoms/condition management  Program: New  How Provided: Verbal, Written  Provided to: Patient  Level of Understanding:  "Verbalized, Demonstrated     PT OP Goals     Row Name 08/25/21 0938          PT Short Term Goals    STG Date to Achieve  09/15/21  -BB     STG 1  AROM knee ext 0 degrees   -BB     STG 2  AROM knee flexion 100 degrees   -BB     STG 3  Ambulate with SC community distances   -BB     STG 4  Independent SLR without lag   -BB        Long Term Goals    LTG Date to Achieve  09/15/21  -BB     LTG 1  AROM knee 0-120 or better   -BB     LTG 2  Ambulate community distances independent of AD safely '  -BB     LTG 3  Knee MMT 5/5   -BB     LTG 4  SLS RLE 3\" or better on level ground   -BB        Time Calculation    PT Goal Re-Cert Due Date  09/15/21  -BB       User Key  (r) = Recorded By, (t) = Taken By, (c) = Cosigned By    Initials Name Provider Type    Adrienne Aranda, PT DPT Physical Therapist          PT Assessment/Plan     Row Name 08/25/21 0938          PT Assessment    Functional Limitations  Impaired gait;Limitations in functional capacity and performance;Performance in leisure activities;Limitations in community activities;Limitation in home management  -BB     Impairments  Balance;Gait;Pain;Muscle strength;Range of motion;Posture;Edema  -BB     Assessment Comments  Patient presents today s/p TKA. Incision present open to air and appeared clean, skin appears WNL for post op without significant edema or redness or warmth. Mild soft edema present. Sensation grossly in tact to crude touch. Patient tolerated treatment well today and could continue to benefit from skilled PT to address functional mobility deficits after TKA.   -BB     Rehab Potential  Good  -BB     Patient/caregiver participated in establishment of treatment plan and goals  Yes  -BB     Patient would benefit from skilled therapy intervention  Yes  -BB        PT Plan    PT Frequency  2x/week  -BB     Predicted Duration of Therapy Intervention (PT)  6-8 weeks   -BB     Planned CPT's?  PT EVAL MOD COMPLELITY: 70851;PT RE-EVAL: 73183;PT THER PROC EA 15 MIN: " 38840;PT THER ACT EA 15 MIN: 65448;PT NEUROMUSC RE-EDUCATION EA 15 MIN: 09945;PT MANUAL THERAPY EA 15 MIN: 95657;PT GAIT TRAINING EA 15 MIN: 97561;PT ELECTRICAL STIM UNATTEND: ;PT SELF CARE/HOME MGMT/TRAIN EA 15: 98599;PT PROSTHETIC TRAIN EA 15 MIN: 55059;PT THER SUPP EA 15 MIN  -BB     PT Plan Comments  ROM with focus on extrension and gentle progression of flexion, quad control, gait, balance, manual PRN, modalities PRN   -BB       User Key  (r) = Recorded By, (t) = Taken By, (c) = Cosigned By    Initials Name Provider Type    Adrienne Aranda PT DPT Physical Therapist          Modalities     Row Name 08/25/21 0938             Subjective Pain    Post-Treatment Pain Level  -- post pain not noted   -BB         Ice    Ice Applied  Yes  -BB      Location  knee with IFC elevated   -BB      Ice S/P Rx  Yes  -BB         ELECTRICAL STIMULATION    Attended/Unattended  Unattended  -BB      Stimulation Type  IFC  -BB      Location/Electrode Placement/Other  knee with ice elevated   -BB        User Key  (r) = Recorded By, (t) = Taken By, (c) = Cosigned By    Initials Name Provider Type    Adrienne Aranda, PT DPT Physical Therapist        OP Exercises     Row Name 08/25/21 0938             Subjective Comments    Subjective Comments  see pt hx   -BB         Subjective Pain    Able to rate subjective pain?  yes  -BB      Pre-Treatment Pain Level  5  -BB      Post-Treatment Pain Level  -- post pain not noted   -BB         Exercise 1    Exercise Name 1  eval   -BB         Exercise 2    Exercise Name 2  QS   -BB      Sets 2  1  -BB      Reps 2  10  -BB      Additional Comments  ect: 0   -BB         Exercise 3    Exercise Name 3  AAROM SLR   -BB      Sets 3  1  -BB      Reps 3  10  -BB      Additional Comments  quad lag noted   -BB         Exercise 4    Exercise Name 4  Heel slides with green strap   -BB      Sets 4  1  -BB      Reps 4  20 approx  -BB      Additional Comments  BP: 110/70 wrist   -BB         Exercise 5     Exercise Name 5  pro 2 rocking   -BB      Time 5  5'   -BB      Additional Comments  no resistance   -BB         Exercise 6    Exercise Name 6  IFC with ice elevated   -BB      Time 6  15'  -BB        User Key  (r) = Recorded By, (t) = Taken By, (c) = Cosigned By    Initials Name Provider Type    Adrienne Aranda PT DPT Physical Therapist                        Outcome Measure Options: Lower Extremity Functional Scale (LEFS)  Lower Extremity Functional Index  Any of your usual work, housework or school activities: Quite a bit of difficulty  Your usual hobbies, recreational or sporting activities: Quite a bit of difficulty  Getting into or out of the bath: Extreme difficulty or unable to perform activity  Walking between rooms: A little bit of difficulty  Putting on your shoes or socks: Extreme difficulty or unable to perform activity  Squatting: Quite a bit of difficulty  Lifting an object, like a bag of groceries from the floor: Extreme difficulty or unable to perform activity  Performing light activities around your home: A little bit of difficulty  Performing heavy activities around your home: Extreme difficulty or unable to perform activity  Getting into or out of a car: Extreme difficulty or unable to perform activity  Walking 2 blocks: Extreme difficulty or unable to perform activity  Walking a mile: Extreme difficulty or unable to perform activity  Going up or down 10 stairs (about 1 flight of stairs): Moderate difficulty  Standing for 1 hour: Quite a bit of difficulty  Sitting for 1 hour: Moderate difficulty  Running on even ground: Extreme difficulty or unable to perform activity  Running on uneven ground: Extreme difficulty or unable to perform activity  Making sharp turns while running fast: Extreme difficulty or unable to perform activity  Hopping: Extreme difficulty or unable to perform activity  Rolling over in bed: Extreme difficulty or unable to perform activity  Total: 14      Time Calculation:      Start Time: 0938  Stop Time: 1038  Time Calculation (min): 60 min     Therapy Charges for Today     Code Description Service Date Service Provider Modifiers Qty    57697309785 HC PT EVAL LOW COMPLEXITY 2 8/25/2021 Adrienne Garcia, PT DPT GP 1    44324073326 HC PT THER PROC EA 15 MIN 8/25/2021 Adrienne Garcia PT DPT GP 1    45139155798 HC PT ELECTRICAL STIM UNATTENDED 8/25/2021 Adrienne Garcia PT DPT  1          PT G-Codes  Outcome Measure Options: Lower Extremity Functional Scale (LEFS)  Total: 14         Adrienne Garcia, PT DPT  8/25/2021

## 2021-08-27 ENCOUNTER — APPOINTMENT (OUTPATIENT)
Dept: CARDIOLOGY | Facility: HOSPITAL | Age: 66
End: 2021-08-27

## 2021-08-27 ENCOUNTER — APPOINTMENT (OUTPATIENT)
Dept: ULTRASOUND IMAGING | Facility: HOSPITAL | Age: 66
End: 2021-08-27

## 2021-08-27 ENCOUNTER — APPOINTMENT (OUTPATIENT)
Dept: GENERAL RADIOLOGY | Facility: HOSPITAL | Age: 66
End: 2021-08-27

## 2021-08-27 ENCOUNTER — HOSPITAL ENCOUNTER (OUTPATIENT)
Dept: PHYSICAL THERAPY | Facility: HOSPITAL | Age: 66
Setting detail: THERAPIES SERIES
Discharge: HOME | End: 2021-08-27

## 2021-08-27 ENCOUNTER — APPOINTMENT (OUTPATIENT)
Dept: CT IMAGING | Facility: HOSPITAL | Age: 66
End: 2021-08-27

## 2021-08-27 ENCOUNTER — TELEPHONE (OUTPATIENT)
Dept: ORTHOPEDIC SURGERY | Facility: CLINIC | Age: 66
End: 2021-08-27

## 2021-08-27 ENCOUNTER — HOSPITAL ENCOUNTER (INPATIENT)
Facility: HOSPITAL | Age: 66
LOS: 1 days | Discharge: HOME | End: 2021-08-30
Attending: FAMILY MEDICINE | Admitting: INTERNAL MEDICINE

## 2021-08-27 DIAGNOSIS — Z78.9 IMPAIRED MOBILITY AND ACTIVITIES OF DAILY LIVING: ICD-10-CM

## 2021-08-27 DIAGNOSIS — M17.11 PRIMARY OSTEOARTHRITIS OF RIGHT KNEE: Primary | ICD-10-CM

## 2021-08-27 DIAGNOSIS — M17.11 PRIMARY OSTEOARTHRITIS OF RIGHT KNEE: ICD-10-CM

## 2021-08-27 DIAGNOSIS — M71.21 SYNOVIAL CYST OF RIGHT POPLITEAL SPACE: ICD-10-CM

## 2021-08-27 DIAGNOSIS — Z74.09 IMPAIRED FUNCTIONAL MOBILITY, BALANCE, GAIT, AND ENDURANCE: ICD-10-CM

## 2021-08-27 DIAGNOSIS — Z74.09 IMPAIRED MOBILITY AND ACTIVITIES OF DAILY LIVING: ICD-10-CM

## 2021-08-27 DIAGNOSIS — I26.99 BILATERAL PULMONARY EMBOLISM (HCC): Primary | ICD-10-CM

## 2021-08-27 PROBLEM — R09.02 HYPOXIA: Status: ACTIVE | Noted: 2021-08-27

## 2021-08-27 LAB
ALBUMIN SERPL-MCNC: 3.8 G/DL (ref 3.5–5.2)
ALBUMIN/GLOB SERPL: 1.5 G/DL
ALP SERPL-CCNC: 142 U/L (ref 39–117)
ALT SERPL W P-5'-P-CCNC: 12 U/L (ref 1–33)
ANION GAP SERPL CALCULATED.3IONS-SCNC: 12 MMOL/L (ref 5–15)
AST SERPL-CCNC: 20 U/L (ref 1–32)
BASOPHILS # BLD AUTO: 0.07 10*3/MM3 (ref 0–0.2)
BASOPHILS NFR BLD AUTO: 0.9 % (ref 0–1.5)
BH CV ECHO MEAS - ACS: 2.1 CM
BH CV ECHO MEAS - AO MAX PG (FULL): 1.7 MMHG
BH CV ECHO MEAS - AO MAX PG: 6.3 MMHG
BH CV ECHO MEAS - AO MEAN PG (FULL): 2 MMHG
BH CV ECHO MEAS - AO MEAN PG: 4 MMHG
BH CV ECHO MEAS - AO ROOT AREA (BSA CORRECTED): 1.5
BH CV ECHO MEAS - AO ROOT AREA: 7.1 CM^2
BH CV ECHO MEAS - AO ROOT DIAM: 3 CM
BH CV ECHO MEAS - AO V2 MAX: 125 CM/SEC
BH CV ECHO MEAS - AO V2 MEAN: 91.1 CM/SEC
BH CV ECHO MEAS - AO V2 VTI: 22.3 CM
BH CV ECHO MEAS - AVA(I,A): 3.1 CM^2
BH CV ECHO MEAS - AVA(I,D): 3.1 CM^2
BH CV ECHO MEAS - AVA(V,A): 3 CM^2
BH CV ECHO MEAS - AVA(V,D): 3 CM^2
BH CV ECHO MEAS - BSA(HAYCOCK): 2 M^2
BH CV ECHO MEAS - BSA: 2 M^2
BH CV ECHO MEAS - BZI_BMI: 29.8 KILOGRAMS/M^2
BH CV ECHO MEAS - BZI_METRIC_HEIGHT: 170.2 CM
BH CV ECHO MEAS - BZI_METRIC_WEIGHT: 86.2 KG
BH CV ECHO MEAS - EDV(CUBED): 65.5 ML
BH CV ECHO MEAS - EDV(MOD-SP2): 63.2 ML
BH CV ECHO MEAS - EDV(MOD-SP4): 41.5 ML
BH CV ECHO MEAS - EDV(TEICH): 71.3 ML
BH CV ECHO MEAS - EF(CUBED): 71.9 %
BH CV ECHO MEAS - EF(MOD-SP2): 73.9 %
BH CV ECHO MEAS - EF(MOD-SP4): 59 %
BH CV ECHO MEAS - EF(TEICH): 64.1 %
BH CV ECHO MEAS - ESV(CUBED): 18.4 ML
BH CV ECHO MEAS - ESV(MOD-SP2): 16.5 ML
BH CV ECHO MEAS - ESV(MOD-SP4): 17 ML
BH CV ECHO MEAS - ESV(TEICH): 25.6 ML
BH CV ECHO MEAS - FS: 34.5 %
BH CV ECHO MEAS - IVS/LVPW: 1.1
BH CV ECHO MEAS - IVSD: 0.94 CM
BH CV ECHO MEAS - LA DIMENSION: 2.7 CM
BH CV ECHO MEAS - LA/AO: 0.9
BH CV ECHO MEAS - LV DIASTOLIC VOL/BSA (35-75): 21 ML/M^2
BH CV ECHO MEAS - LV MASS(C)D: 108.3 GRAMS
BH CV ECHO MEAS - LV MASS(C)DI: 54.7 GRAMS/M^2
BH CV ECHO MEAS - LV MAX PG: 4.6 MMHG
BH CV ECHO MEAS - LV MEAN PG: 2 MMHG
BH CV ECHO MEAS - LV SYSTOLIC VOL/BSA (12-30): 8.6 ML/M^2
BH CV ECHO MEAS - LV V1 MAX: 107 CM/SEC
BH CV ECHO MEAS - LV V1 MEAN: 65.4 CM/SEC
BH CV ECHO MEAS - LV V1 VTI: 19.9 CM
BH CV ECHO MEAS - LVIDD: 4 CM
BH CV ECHO MEAS - LVIDS: 2.6 CM
BH CV ECHO MEAS - LVLD AP2: 7.3 CM
BH CV ECHO MEAS - LVLD AP4: 6.6 CM
BH CV ECHO MEAS - LVLS AP2: 5 CM
BH CV ECHO MEAS - LVLS AP4: 4.9 CM
BH CV ECHO MEAS - LVOT AREA (M): 3.5 CM^2
BH CV ECHO MEAS - LVOT AREA: 3.5 CM^2
BH CV ECHO MEAS - LVOT DIAM: 2.1 CM
BH CV ECHO MEAS - LVPWD: 0.83 CM
BH CV ECHO MEAS - MV A MAX VEL: 91.3 CM/SEC
BH CV ECHO MEAS - MV DEC SLOPE: 260 CM/SEC^2
BH CV ECHO MEAS - MV E MAX VEL: 67.3 CM/SEC
BH CV ECHO MEAS - MV E/A: 0.74
BH CV ECHO MEAS - MV P1/2T MAX VEL: 73.3 CM/SEC
BH CV ECHO MEAS - MV P1/2T: 82.6 MSEC
BH CV ECHO MEAS - MVA P1/2T LCG: 3 CM^2
BH CV ECHO MEAS - MVA(P1/2T): 2.7 CM^2
BH CV ECHO MEAS - RAP SYSTOLE: 5 MMHG
BH CV ECHO MEAS - RVSP: 46.5 MMHG
BH CV ECHO MEAS - SI(AO): 79.7 ML/M^2
BH CV ECHO MEAS - SI(CUBED): 23.8 ML/M^2
BH CV ECHO MEAS - SI(LVOT): 34.8 ML/M^2
BH CV ECHO MEAS - SI(MOD-SP2): 23.6 ML/M^2
BH CV ECHO MEAS - SI(MOD-SP4): 12.4 ML/M^2
BH CV ECHO MEAS - SI(TEICH): 23.1 ML/M^2
BH CV ECHO MEAS - SV(AO): 157.6 ML
BH CV ECHO MEAS - SV(CUBED): 47.1 ML
BH CV ECHO MEAS - SV(LVOT): 68.9 ML
BH CV ECHO MEAS - SV(MOD-SP2): 46.7 ML
BH CV ECHO MEAS - SV(MOD-SP4): 24.5 ML
BH CV ECHO MEAS - SV(TEICH): 45.7 ML
BH CV ECHO MEAS - TR MAX VEL: 322 CM/SEC
BILIRUB SERPL-MCNC: 0.5 MG/DL (ref 0–1.2)
BUN SERPL-MCNC: 12 MG/DL (ref 8–23)
BUN/CREAT SERPL: 14.3 (ref 7–25)
CALCIUM SPEC-SCNC: 9.2 MG/DL (ref 8.6–10.5)
CHLORIDE SERPL-SCNC: 101 MMOL/L (ref 98–107)
CO2 SERPL-SCNC: 26 MMOL/L (ref 22–29)
CREAT SERPL-MCNC: 0.84 MG/DL (ref 0.57–1)
D-DIMER, QUANTITATIVE (MAD,POW, STR): 2784 NG/ML (FEU) (ref 0–470)
DEPRECATED RDW RBC AUTO: 42.9 FL (ref 37–54)
EOSINOPHIL # BLD AUTO: 0.14 10*3/MM3 (ref 0–0.4)
EOSINOPHIL NFR BLD AUTO: 1.7 % (ref 0.3–6.2)
ERYTHROCYTE [DISTWIDTH] IN BLOOD BY AUTOMATED COUNT: 12.9 % (ref 12.3–15.4)
FLUAV SUBTYP SPEC NAA+PROBE: NOT DETECTED
FLUBV RNA ISLT QL NAA+PROBE: NOT DETECTED
GFR SERPL CREATININE-BSD FRML MDRD: 68 ML/MIN/1.73
GLOBULIN UR ELPH-MCNC: 2.5 GM/DL
GLUCOSE SERPL-MCNC: 109 MG/DL (ref 65–99)
HCT VFR BLD AUTO: 34.6 % (ref 34–46.6)
HCYS SERPL-MCNC: 11.8 UMOL/L (ref 0–15)
HGB BLD-MCNC: 11.6 G/DL (ref 12–15.9)
HOLD SPECIMEN: NORMAL
IMM GRANULOCYTES # BLD AUTO: 0.06 10*3/MM3 (ref 0–0.05)
IMM GRANULOCYTES NFR BLD AUTO: 0.7 % (ref 0–0.5)
LV EF 2D ECHO EST: 63 %
LYMPHOCYTES # BLD AUTO: 1.42 10*3/MM3 (ref 0.7–3.1)
LYMPHOCYTES NFR BLD AUTO: 17.6 % (ref 19.6–45.3)
MAXIMAL PREDICTED HEART RATE: 154 BPM
MCH RBC QN AUTO: 31.2 PG (ref 26.6–33)
MCHC RBC AUTO-ENTMCNC: 33.5 G/DL (ref 31.5–35.7)
MCV RBC AUTO: 93 FL (ref 79–97)
MONOCYTES # BLD AUTO: 0.69 10*3/MM3 (ref 0.1–0.9)
MONOCYTES NFR BLD AUTO: 8.6 % (ref 5–12)
NEUTROPHILS NFR BLD AUTO: 5.68 10*3/MM3 (ref 1.7–7)
NEUTROPHILS NFR BLD AUTO: 70.5 % (ref 42.7–76)
NRBC BLD AUTO-RTO: 0.2 /100 WBC (ref 0–0.2)
PLATELET # BLD AUTO: 239 10*3/MM3 (ref 140–450)
PMV BLD AUTO: 9.7 FL (ref 6–12)
POTASSIUM SERPL-SCNC: 3.6 MMOL/L (ref 3.5–5.2)
PROT SERPL-MCNC: 6.3 G/DL (ref 6–8.5)
QT INTERVAL: 378 MS
QTC INTERVAL: 433 MS
RBC # BLD AUTO: 3.72 10*6/MM3 (ref 3.77–5.28)
SARS-COV-2 RNA PNL SPEC NAA+PROBE: NOT DETECTED
SODIUM SERPL-SCNC: 139 MMOL/L (ref 136–145)
STRESS TARGET HR: 131 BPM
WBC # BLD AUTO: 8.06 10*3/MM3 (ref 3.4–10.8)

## 2021-08-27 PROCEDURE — 87636 SARSCOV2 & INF A&B AMP PRB: CPT | Performed by: FAMILY MEDICINE

## 2021-08-27 PROCEDURE — 81240 F2 GENE: CPT | Performed by: NURSE PRACTITIONER

## 2021-08-27 PROCEDURE — 99284 EMERGENCY DEPT VISIT MOD MDM: CPT

## 2021-08-27 PROCEDURE — 93010 ELECTROCARDIOGRAM REPORT: CPT | Performed by: INTERNAL MEDICINE

## 2021-08-27 PROCEDURE — 71275 CT ANGIOGRAPHY CHEST: CPT

## 2021-08-27 PROCEDURE — 93971 EXTREMITY STUDY: CPT

## 2021-08-27 PROCEDURE — G0378 HOSPITAL OBSERVATION PER HR: HCPCS

## 2021-08-27 PROCEDURE — 85025 COMPLETE CBC W/AUTO DIFF WBC: CPT | Performed by: STUDENT IN AN ORGANIZED HEALTH CARE EDUCATION/TRAINING PROGRAM

## 2021-08-27 PROCEDURE — 93306 TTE W/DOPPLER COMPLETE: CPT

## 2021-08-27 PROCEDURE — 97110 THERAPEUTIC EXERCISES: CPT

## 2021-08-27 PROCEDURE — 93306 TTE W/DOPPLER COMPLETE: CPT | Performed by: INTERNAL MEDICINE

## 2021-08-27 PROCEDURE — 71046 X-RAY EXAM CHEST 2 VIEWS: CPT

## 2021-08-27 PROCEDURE — 0 IOPAMIDOL PER 1 ML: Performed by: FAMILY MEDICINE

## 2021-08-27 PROCEDURE — 93005 ELECTROCARDIOGRAM TRACING: CPT | Performed by: FAMILY MEDICINE

## 2021-08-27 PROCEDURE — 81241 F5 GENE: CPT | Performed by: NURSE PRACTITIONER

## 2021-08-27 PROCEDURE — 85379 FIBRIN DEGRADATION QUANT: CPT | Performed by: STUDENT IN AN ORGANIZED HEALTH CARE EDUCATION/TRAINING PROGRAM

## 2021-08-27 PROCEDURE — 80053 COMPREHEN METABOLIC PANEL: CPT | Performed by: STUDENT IN AN ORGANIZED HEALTH CARE EDUCATION/TRAINING PROGRAM

## 2021-08-27 PROCEDURE — 99204 OFFICE O/P NEW MOD 45 MIN: CPT | Performed by: THORACIC SURGERY (CARDIOTHORACIC VASCULAR SURGERY)

## 2021-08-27 PROCEDURE — 83090 ASSAY OF HOMOCYSTEINE: CPT | Performed by: NURSE PRACTITIONER

## 2021-08-27 PROCEDURE — G0283 ELEC STIM OTHER THAN WOUND: HCPCS

## 2021-08-27 RX ORDER — ALBUTEROL SULFATE 90 UG/1
2 AEROSOL, METERED RESPIRATORY (INHALATION) EVERY 4 HOURS PRN
Status: DISCONTINUED | OUTPATIENT
Start: 2021-08-27 | End: 2021-08-27 | Stop reason: ALTCHOICE

## 2021-08-27 RX ORDER — ASPIRIN 81 MG/1
81 TABLET, CHEWABLE ORAL 2 TIMES DAILY
Status: DISCONTINUED | OUTPATIENT
Start: 2021-08-27 | End: 2021-08-30 | Stop reason: HOSPADM

## 2021-08-27 RX ORDER — ATORVASTATIN CALCIUM 40 MG/1
40 TABLET, FILM COATED ORAL NIGHTLY
Status: DISCONTINUED | OUTPATIENT
Start: 2021-08-27 | End: 2021-08-30 | Stop reason: HOSPADM

## 2021-08-27 RX ORDER — VALSARTAN 160 MG/1
160 TABLET ORAL DAILY
Status: DISCONTINUED | OUTPATIENT
Start: 2021-08-28 | End: 2021-08-30 | Stop reason: HOSPADM

## 2021-08-27 RX ORDER — ALBUTEROL SULFATE 2.5 MG/3ML
2.5 SOLUTION RESPIRATORY (INHALATION) EVERY 4 HOURS PRN
Status: DISCONTINUED | OUTPATIENT
Start: 2021-08-27 | End: 2021-08-29

## 2021-08-27 RX ORDER — SODIUM CHLORIDE 0.9 % (FLUSH) 0.9 %
10 SYRINGE (ML) INJECTION AS NEEDED
Status: DISCONTINUED | OUTPATIENT
Start: 2021-08-27 | End: 2021-08-30 | Stop reason: HOSPADM

## 2021-08-27 RX ORDER — SODIUM CHLORIDE 0.9 % (FLUSH) 0.9 %
10 SYRINGE (ML) INJECTION EVERY 12 HOURS SCHEDULED
Status: DISCONTINUED | OUTPATIENT
Start: 2021-08-27 | End: 2021-08-30 | Stop reason: HOSPADM

## 2021-08-27 RX ORDER — OXYCODONE AND ACETAMINOPHEN 7.5; 325 MG/1; MG/1
1 TABLET ORAL EVERY 6 HOURS PRN
Status: DISCONTINUED | OUTPATIENT
Start: 2021-08-27 | End: 2021-08-30 | Stop reason: HOSPADM

## 2021-08-27 RX ORDER — ACETAMINOPHEN 500 MG
500 TABLET ORAL EVERY 6 HOURS PRN
Status: DISCONTINUED | OUTPATIENT
Start: 2021-08-27 | End: 2021-08-30 | Stop reason: HOSPADM

## 2021-08-27 RX ORDER — ACETAMINOPHEN 500 MG
1000 TABLET ORAL ONCE
Status: COMPLETED | OUTPATIENT
Start: 2021-08-27 | End: 2021-08-27

## 2021-08-27 RX ADMIN — OXYCODONE HYDROCHLORIDE AND ACETAMINOPHEN 1 TABLET: 7.5; 325 TABLET ORAL at 16:47

## 2021-08-27 RX ADMIN — APIXABAN 10 MG: 5 TABLET, FILM COATED ORAL at 22:11

## 2021-08-27 RX ADMIN — ASPIRIN 81 MG: 81 TABLET, CHEWABLE ORAL at 20:18

## 2021-08-27 RX ADMIN — IOPAMIDOL 57 ML: 755 INJECTION, SOLUTION INTRAVENOUS at 13:10

## 2021-08-27 RX ADMIN — APIXABAN 10 MG: 5 TABLET, FILM COATED ORAL at 14:49

## 2021-08-27 RX ADMIN — ATORVASTATIN CALCIUM 40 MG: 40 TABLET, FILM COATED ORAL at 20:18

## 2021-08-27 RX ADMIN — OXYCODONE HYDROCHLORIDE AND ACETAMINOPHEN 1 TABLET: 7.5; 325 TABLET ORAL at 22:12

## 2021-08-27 RX ADMIN — SODIUM CHLORIDE, PRESERVATIVE FREE 10 ML: 5 INJECTION INTRAVENOUS at 20:19

## 2021-08-27 RX ADMIN — ACETAMINOPHEN 1000 MG: 500 TABLET ORAL at 13:14

## 2021-08-27 NOTE — THERAPY TREATMENT NOTE
Outpatient Physical Therapy Ortho Treatment Note  [facility]     Patient Name: Ann Marie Malik  : 1955  MRN: [id number]  Today's Date: 2021      Visit Date: 2021     Subjective Improvement: 0%  Attendance:  2/2 (medicare)  Next MD Visit : 2021  Recert Date:  09/15/2021      Therapy Diagnosis:  S/P R TKA 2021        Visit Dx:    ICD-10-CM ICD-9-CM   1. Primary osteoarthritis of right knee  M17.11 715.16   2. Synovial cyst of right popliteal space  M71.21 727.51       Patient Active Problem List   Diagnosis   • Chronic pain of right knee   • Primary osteoarthritis of right knee   • Internal derangement of right knee   • Degenerative tear of lateral meniscus of right knee   • Chondromalacia of right knee   • Synovial cyst of right popliteal space   • Cerebrovascular accident (CVA) (CMS/HCC)   • Complex tear of lateral meniscus of right knee as current injury        Past Medical History:   Diagnosis Date   • GERD (gastroesophageal reflux disease)    • Hyperlipidemia    • Hypertension    • Primary osteoarthritis of right knee 3/8/2020   • Stroke (CMS/HCC)         Past Surgical History:   Procedure Laterality Date   • TOTAL KNEE ARTHROPLASTY Right 2021    Procedure: RIGHT TOTAL KNEE ARTHROPLASTY  with adductor canal block;  Surgeon: Chris Covington MD;  Location: [facility];  Service: Orthopedics;  Laterality: Right;   • TUBAL ABDOMINAL LIGATION         PT Ortho     Row Name 21 0900       Precautions and Contraindications    Precautions/Limitations  no known precautions/limitations  -    Precautions  Monitor BP PRN- hx of CVA without residual effects   -       Posture/Observations    Posture/Observations Comments  gait with rolling walker; staples intact; no drainage noted  -       General ROM    GENERAL ROM COMMENTS  0-95° AROM   -      User Key  (r) = Recorded By, (t) = Taken By, (c) = Cosigned By    Initials Name Provider Type    LIAN Amanda  MO Brown Physical Therapy Assistant                      PT Assessment/Plan     Row Name 08/27/21 0900          PT Assessment    Functional Limitations  Impaired gait;Limitations in functional capacity and performance;Performance in leisure activities;Limitations in community activities;Limitation in home management  -     Impairments  Balance;Gait;Pain;Muscle strength;Range of motion;Posture;Edema  -     Assessment Comments  patient treatment was shorten and MD office called this date secondary to SOA and low O2 sats, even with rest and breathing exercises secondary to O2 staying and patient still feeling SOA; MD office was called, was unable to speak with MD but spoke with nurse which instructed pt to call her PCP; Pt then called her PCP who wanted her to go to the ER; Pt then went to ER with ; MD did call back around 1115 who stated that ER was the correct place to be and we would await results about continued PT once finding out the cause of the SOA; Instructed pt to continue with HEP as she is able to do but not to push her self; She understood the POC and pt's primary PT was called to notify her of the situation  -     Rehab Potential  Good  -     Patient/caregiver participated in establishment of treatment plan and goals  Yes  -     Patient would benefit from skilled therapy intervention  Yes  -        PT Plan    PT Frequency  2x/week  -     Predicted Duration of Therapy Intervention (PT)  6-8 weeks  -     PT Plan Comments  Continue with ROM, strength and gait; Progress to cane when able; Monitor vitals and SOA  -       User Key  (r) = Recorded By, (t) = Taken By, (c) = Cosigned By    Initials Name Provider Type     Stephanie Amanda PTA Physical Therapy Assistant          Modalities     Row Name 08/27/21 0900             Ice    Ice Applied  Yes  -      Location  R knee with IFC elevated   -KH      Ice S/P Rx  Yes  -         ELECTRICAL STIMULATION    Attended/Unattended  Unattended  " -KH      Stimulation Type  IFC  -KH      Location/Electrode Placement/Other  R knee with ice elevated   -KH      PT E-Stim Unattended Minutes  15  -KH         Functional Testing    Outcome Measure Options  Lower Extremity Functional Scale (LEFS)  -        User Key  (r) = Recorded By, (t) = Taken By, (c) = Cosigned By    Initials Name Provider Type    Stephanie Hameed PTA Physical Therapy Assistant        OP Exercises     Row Name 08/27/21 0900             Subjective Comments    Subjective Comments  Patient reports that her knee just feels really tight and stiff this morning; Also reports that she has been getting SOA just moving from small distances at home;   -         Subjective Pain    Able to rate subjective pain?  yes  -      Pre-Treatment Pain Level  4 took pain pill 30 minutes prior PT   -      Post-Treatment Pain Level  4  -         Total Minutes    76892 - PT Therapeutic Exercise Minutes  40  -KH         Exercise 1    Exercise Name 1  pro ll LE ROM (able to make full circles)  -      Time 1  10 mins  -KH      Additional Comments  level 1; seat 13  -KH         Exercise 2    Exercise Name 2  quad sets  -KH      Cueing 2  Verbal  -KH      Sets 2  2  -KH      Reps 2  10  -KH      Time 2  5\" holds  -KH         Exercise 3    Exercise Name 3  HC/Ham stretch with strap  -KH      Sets 3  3  -KH      Time 3  30\" holds  -KH         Exercise 4    Exercise Name 4  SAQ  -KH      Cueing 4  Verbal  -KH      Sets 4  2  -KH      Reps 4  10  -KH      Additional Comments  O2 checked after secondary to labored breathing noted; 84% HR 87; after purse lip breathing O2 increased to 95%  -KH         Exercise 5    Exercise Name 5  SLR  -KH      Sets 5  1  -KH      Reps 5  10  -KH         Exercise 6    Exercise Name 6  O2 81%   -KH      Cueing 6  --  -KH      Sets 6  --  -KH      Reps 6  --  -KH         Exercise 7    Exercise Name 7  walked pt to [facility] after IFC and ICE O2 81%,   -KH        User Key  (r) = " "Recorded By, (t) = Taken By, (c) = Cosigned By    Initials Name Provider Type    Stephanie Hameed PTA Physical Therapy Assistant                       PT OP Goals     Row Name 08/27/21 0900          PT Short Term Goals    STG Date to Achieve  09/15/21  -     STG 1  AROM knee ext 0 degrees   -     STG 1 Progress  Met  -     STG 2  AROM knee flexion 100 degrees   -     STG 3  Ambulate with SC community distances   -     STG 4  Independent SLR without lag   -        Long Term Goals    LTG Date to Achieve  09/15/21  -     LTG 1  AROM knee 0-120 or better   -     LTG 2  Ambulate community distances independent of AD safely '  -     LTG 3  Knee MMT 5/5   -     LTG 4  SLS RLE 3\" or better on level ground   -        Time Calculation    PT Goal Re-Cert Due Date  09/15/21  -       User Key  (r) = Recorded By, (t) = Taken By, (c) = Cosigned By    Initials Name Provider Type    Stephanie Hameed PTA Physical Therapy Assistant               Outcome Measure Options: Lower Extremity Functional Scale (LEFS)         Time Calculation:   Start Time: 0930  Stop Time: 1025  Time Calculation (min): 55 min  Timed Charges  28507 - PT Therapeutic Exercise Minutes: 40  Untimed Charges  PT E-Stim Unattended Minutes: 15  Total Minutes  Timed Charges Total Minutes: 40  Untimed Charges Total Minutes: 15   Total Minutes: 55  Therapy Charges for Today     Code Description Service Date Service Provider Modifiers Qty    31337815590 HC PT THER PROC EA 15 MIN 8/27/2021 Stephanie Amanda PTA GP 3    57260674176 HC PT ELECTRICAL STIM UNATTENDED 8/27/2021 Stephanie Amanda PTA  1    37881256199 HC PT THER SUPP EA 15 MIN 8/27/2021 Stephanie Amanda, [place] GP 1          PT G-Codes  Outcome Measure Options: Lower Extremity Functional Scale (LEFS)         Stephanie Amanda PTA  8/27/2021     "

## 2021-08-27 NOTE — TELEPHONE ENCOUNTER
PHYSICAL THERAPIST KIMBERLY CALLED TO SAY THAT PT  O2 LEVELS DROPPED INTO THE LOW 80'S DURING THERAPY. THE HIGHEST THEY WERE ABLE TO GET THE LEVELS WAS 92. PT HAS COMPLAINED OF LABORED BREATHING. KIMBERLY WOULD LIKE TO KNOW IF PT NEEDS TO BE SEEN WITH DR. KHAN SOONER THAN NEXT APPOINTMENT AND WHETHER PT SHOULD CONTINUE THERAPY RIGHT NOW. PT WILL ALSO TRY TO GET IN WITH FAMILY DOCTOR FOR HER BREATHING ISSUE. KIMBERLY WOULD LIKE A CALL BACK TO LET HER KNOW HOW TO PROCEED. HER CALL BACK NUMBER [phone].

## 2021-08-28 LAB
ANION GAP SERPL CALCULATED.3IONS-SCNC: 9 MMOL/L (ref 5–15)
BUN SERPL-MCNC: 11 MG/DL (ref 8–23)
BUN/CREAT SERPL: 15.3 (ref 7–25)
CALCIUM SPEC-SCNC: 8.9 MG/DL (ref 8.6–10.5)
CHLORIDE SERPL-SCNC: 102 MMOL/L (ref 98–107)
CO2 SERPL-SCNC: 27 MMOL/L (ref 22–29)
CREAT SERPL-MCNC: 0.72 MG/DL (ref 0.57–1)
DEPRECATED RDW RBC AUTO: 44.2 FL (ref 37–54)
ERYTHROCYTE [DISTWIDTH] IN BLOOD BY AUTOMATED COUNT: 13.1 % (ref 12.3–15.4)
GFR SERPL CREATININE-BSD FRML MDRD: 81 ML/MIN/1.73
GLUCOSE SERPL-MCNC: 110 MG/DL (ref 65–99)
HCT VFR BLD AUTO: 33.8 % (ref 34–46.6)
HGB BLD-MCNC: 11.2 G/DL (ref 12–15.9)
MCH RBC QN AUTO: 31.3 PG (ref 26.6–33)
MCHC RBC AUTO-ENTMCNC: 33.1 G/DL (ref 31.5–35.7)
MCV RBC AUTO: 94.4 FL (ref 79–97)
PLATELET # BLD AUTO: 236 10*3/MM3 (ref 140–450)
PMV BLD AUTO: 9.9 FL (ref 6–12)
POTASSIUM SERPL-SCNC: 3.3 MMOL/L (ref 3.5–5.2)
POTASSIUM SERPL-SCNC: 4.2 MMOL/L (ref 3.5–5.2)
QT INTERVAL: 420 MS
QTC INTERVAL: 462 MS
RBC # BLD AUTO: 3.58 10*6/MM3 (ref 3.77–5.28)
SODIUM SERPL-SCNC: 138 MMOL/L (ref 136–145)
WBC # BLD AUTO: 5.48 10*3/MM3 (ref 3.4–10.8)

## 2021-08-28 PROCEDURE — 84132 ASSAY OF SERUM POTASSIUM: CPT | Performed by: INTERNAL MEDICINE

## 2021-08-28 PROCEDURE — 93010 ELECTROCARDIOGRAM REPORT: CPT | Performed by: INTERNAL MEDICINE

## 2021-08-28 PROCEDURE — G0378 HOSPITAL OBSERVATION PER HR: HCPCS

## 2021-08-28 PROCEDURE — 85027 COMPLETE CBC AUTOMATED: CPT | Performed by: INTERNAL MEDICINE

## 2021-08-28 PROCEDURE — 97162 PT EVAL MOD COMPLEX 30 MIN: CPT

## 2021-08-28 PROCEDURE — 80048 BASIC METABOLIC PNL TOTAL CA: CPT | Performed by: INTERNAL MEDICINE

## 2021-08-28 PROCEDURE — 97110 THERAPEUTIC EXERCISES: CPT

## 2021-08-28 PROCEDURE — 93005 ELECTROCARDIOGRAM TRACING: CPT | Performed by: INTERNAL MEDICINE

## 2021-08-28 PROCEDURE — 97165 OT EVAL LOW COMPLEX 30 MIN: CPT

## 2021-08-28 RX ORDER — POTASSIUM CHLORIDE 7.45 MG/ML
10 INJECTION INTRAVENOUS
Status: DISCONTINUED | OUTPATIENT
Start: 2021-08-28 | End: 2021-08-30 | Stop reason: HOSPADM

## 2021-08-28 RX ORDER — POTASSIUM CHLORIDE 1.5 G/1.77G
40 POWDER, FOR SOLUTION ORAL AS NEEDED
Status: DISCONTINUED | OUTPATIENT
Start: 2021-08-28 | End: 2021-08-30 | Stop reason: HOSPADM

## 2021-08-28 RX ORDER — POTASSIUM CHLORIDE 750 MG/1
40 CAPSULE, EXTENDED RELEASE ORAL AS NEEDED
Status: DISCONTINUED | OUTPATIENT
Start: 2021-08-28 | End: 2021-08-30 | Stop reason: HOSPADM

## 2021-08-28 RX ADMIN — APIXABAN 10 MG: 5 TABLET, FILM COATED ORAL at 20:50

## 2021-08-28 RX ADMIN — POTASSIUM CHLORIDE 40 MEQ: 750 CAPSULE, EXTENDED RELEASE ORAL at 14:28

## 2021-08-28 RX ADMIN — SODIUM CHLORIDE, PRESERVATIVE FREE 10 ML: 5 INJECTION INTRAVENOUS at 20:50

## 2021-08-28 RX ADMIN — ASPIRIN 81 MG: 81 TABLET, CHEWABLE ORAL at 08:09

## 2021-08-28 RX ADMIN — ATORVASTATIN CALCIUM 40 MG: 40 TABLET, FILM COATED ORAL at 20:50

## 2021-08-28 RX ADMIN — POTASSIUM CHLORIDE 40 MEQ: 750 CAPSULE, EXTENDED RELEASE ORAL at 11:16

## 2021-08-28 RX ADMIN — ASPIRIN 81 MG: 81 TABLET, CHEWABLE ORAL at 20:50

## 2021-08-28 RX ADMIN — VALSARTAN 160 MG: 160 TABLET, FILM COATED ORAL at 08:09

## 2021-08-28 RX ADMIN — OXYCODONE HYDROCHLORIDE AND ACETAMINOPHEN 1 TABLET: 7.5; 325 TABLET ORAL at 17:26

## 2021-08-28 RX ADMIN — OXYCODONE HYDROCHLORIDE AND ACETAMINOPHEN 1 TABLET: 7.5; 325 TABLET ORAL at 04:47

## 2021-08-28 RX ADMIN — SODIUM CHLORIDE, PRESERVATIVE FREE 10 ML: 5 INJECTION INTRAVENOUS at 08:09

## 2021-08-28 RX ADMIN — APIXABAN 10 MG: 5 TABLET, FILM COATED ORAL at 08:09

## 2021-08-29 LAB
POTASSIUM SERPL-SCNC: 4.5 MMOL/L (ref 3.5–5.2)
WHOLE BLOOD HOLD SPECIMEN: NORMAL

## 2021-08-29 PROCEDURE — 94640 AIRWAY INHALATION TREATMENT: CPT

## 2021-08-29 PROCEDURE — 94760 N-INVAS EAR/PLS OXIMETRY 1: CPT

## 2021-08-29 PROCEDURE — 84132 ASSAY OF SERUM POTASSIUM: CPT | Performed by: INTERNAL MEDICINE

## 2021-08-29 PROCEDURE — 94799 UNLISTED PULMONARY SVC/PX: CPT

## 2021-08-29 PROCEDURE — 97110 THERAPEUTIC EXERCISES: CPT

## 2021-08-29 PROCEDURE — 97116 GAIT TRAINING THERAPY: CPT

## 2021-08-29 PROCEDURE — 97530 THERAPEUTIC ACTIVITIES: CPT

## 2021-08-29 RX ORDER — ALBUTEROL SULFATE 2.5 MG/3ML
2.5 SOLUTION RESPIRATORY (INHALATION)
Status: DISCONTINUED | OUTPATIENT
Start: 2021-08-29 | End: 2021-08-30 | Stop reason: HOSPADM

## 2021-08-29 RX ADMIN — ASPIRIN 81 MG: 81 TABLET, CHEWABLE ORAL at 08:48

## 2021-08-29 RX ADMIN — VALSARTAN 160 MG: 160 TABLET, FILM COATED ORAL at 08:47

## 2021-08-29 RX ADMIN — ATORVASTATIN CALCIUM 40 MG: 40 TABLET, FILM COATED ORAL at 20:37

## 2021-08-29 RX ADMIN — OXYCODONE HYDROCHLORIDE AND ACETAMINOPHEN 1 TABLET: 7.5; 325 TABLET ORAL at 17:19

## 2021-08-29 RX ADMIN — ALBUTEROL SULFATE 2.5 MG: 2.5 SOLUTION RESPIRATORY (INHALATION) at 19:24

## 2021-08-29 RX ADMIN — OXYCODONE HYDROCHLORIDE AND ACETAMINOPHEN 1 TABLET: 7.5; 325 TABLET ORAL at 01:11

## 2021-08-29 RX ADMIN — OXYCODONE HYDROCHLORIDE AND ACETAMINOPHEN 1 TABLET: 7.5; 325 TABLET ORAL at 06:50

## 2021-08-29 RX ADMIN — APIXABAN 10 MG: 5 TABLET, FILM COATED ORAL at 08:47

## 2021-08-29 RX ADMIN — ALBUTEROL SULFATE 2.5 MG: 2.5 SOLUTION RESPIRATORY (INHALATION) at 13:37

## 2021-08-29 RX ADMIN — ASPIRIN 81 MG: 81 TABLET, CHEWABLE ORAL at 20:36

## 2021-08-29 RX ADMIN — OXYCODONE HYDROCHLORIDE AND ACETAMINOPHEN 1 TABLET: 7.5; 325 TABLET ORAL at 23:40

## 2021-08-29 RX ADMIN — SODIUM CHLORIDE, PRESERVATIVE FREE 10 ML: 5 INJECTION INTRAVENOUS at 20:37

## 2021-08-29 RX ADMIN — SODIUM CHLORIDE, PRESERVATIVE FREE 10 ML: 5 INJECTION INTRAVENOUS at 08:48

## 2021-08-29 RX ADMIN — APIXABAN 10 MG: 5 TABLET, FILM COATED ORAL at 20:36

## 2021-08-30 ENCOUNTER — APPOINTMENT (OUTPATIENT)
Dept: PHYSICAL THERAPY | Facility: HOSPITAL | Age: 66
End: 2021-08-30

## 2021-08-30 ENCOUNTER — READMISSION MANAGEMENT (OUTPATIENT)
Dept: CALL CENTER | Facility: HOSPITAL | Age: 66
End: 2021-08-30

## 2021-08-30 VITALS
WEIGHT: 182.4 LBS | DIASTOLIC BLOOD PRESSURE: 65 MMHG | BODY MASS INDEX: 28.63 KG/M2 | RESPIRATION RATE: 16 BRPM | OXYGEN SATURATION: 96 % | HEART RATE: 100 BPM | TEMPERATURE: 98.3 F | HEIGHT: 67 IN | SYSTOLIC BLOOD PRESSURE: 140 MMHG

## 2021-08-30 LAB
ANION GAP SERPL CALCULATED.3IONS-SCNC: 12 MMOL/L (ref 5–15)
BUN SERPL-MCNC: 11 MG/DL (ref 8–23)
BUN/CREAT SERPL: 15.3 (ref 7–25)
CALCIUM SPEC-SCNC: 9.3 MG/DL (ref 8.6–10.5)
CHLORIDE SERPL-SCNC: 102 MMOL/L (ref 98–107)
CO2 SERPL-SCNC: 25 MMOL/L (ref 22–29)
CREAT SERPL-MCNC: 0.72 MG/DL (ref 0.57–1)
F5 GENE MUT ANL BLD/T: NORMAL
FACTOR II, DNA ANALYSIS: NORMAL
GFR SERPL CREATININE-BSD FRML MDRD: 81 ML/MIN/1.73
GLUCOSE SERPL-MCNC: 101 MG/DL (ref 65–99)
POTASSIUM SERPL-SCNC: 3.9 MMOL/L (ref 3.5–5.2)
SODIUM SERPL-SCNC: 139 MMOL/L (ref 136–145)
WHOLE BLOOD HOLD SPECIMEN: NORMAL

## 2021-08-30 PROCEDURE — 94799 UNLISTED PULMONARY SVC/PX: CPT

## 2021-08-30 PROCEDURE — 99024 POSTOP FOLLOW-UP VISIT: CPT | Performed by: ORTHOPAEDIC SURGERY

## 2021-08-30 PROCEDURE — 97530 THERAPEUTIC ACTIVITIES: CPT

## 2021-08-30 PROCEDURE — 97116 GAIT TRAINING THERAPY: CPT

## 2021-08-30 PROCEDURE — 80048 BASIC METABOLIC PNL TOTAL CA: CPT | Performed by: INTERNAL MEDICINE

## 2021-08-30 PROCEDURE — 97110 THERAPEUTIC EXERCISES: CPT

## 2021-08-30 PROCEDURE — 94760 N-INVAS EAR/PLS OXIMETRY 1: CPT

## 2021-08-30 RX ADMIN — APIXABAN 10 MG: 5 TABLET, FILM COATED ORAL at 08:09

## 2021-08-30 RX ADMIN — VALSARTAN 160 MG: 160 TABLET, FILM COATED ORAL at 08:09

## 2021-08-30 RX ADMIN — OXYCODONE HYDROCHLORIDE AND ACETAMINOPHEN 1 TABLET: 7.5; 325 TABLET ORAL at 08:11

## 2021-08-30 RX ADMIN — SODIUM CHLORIDE, PRESERVATIVE FREE 10 ML: 5 INJECTION INTRAVENOUS at 08:09

## 2021-08-30 RX ADMIN — ALBUTEROL SULFATE 2.5 MG: 2.5 SOLUTION RESPIRATORY (INHALATION) at 07:01

## 2021-08-30 RX ADMIN — ASPIRIN 81 MG: 81 TABLET, CHEWABLE ORAL at 08:09

## 2021-08-30 NOTE — OUTREACH NOTE
Prep Survey      Responses   [facility] [place] patient discharged from?  [place]   Is LACE score < 7 ?  No   Emergency Room discharge w/ pulse ox?  No   Eligibility  [facility]   Date of Admission  08/27/21   Date of Discharge  08/30/21   Discharge Disposition  Home or Self Care   Discharge diagnosis  Bilateral pulmonary embolism    Does the patient have one of the following disease processes/diagnoses(primary or secondary)?  Other   Does the patient have Home health ordered?  No   Is there a DME ordered?  No   Prep survey completed?  Yes          Mar De La Cruz RN

## 2021-08-31 ENCOUNTER — TRANSITIONAL CARE MANAGEMENT TELEPHONE ENCOUNTER (OUTPATIENT)
Dept: CALL CENTER | Facility: HOSPITAL | Age: 66
End: 2021-08-31

## 2021-08-31 ENCOUNTER — OFFICE VISIT (OUTPATIENT)
Dept: FAMILY MEDICINE CLINIC | Facility: CLINIC | Age: 66
End: 2021-08-31

## 2021-08-31 VITALS
WEIGHT: 181 LBS | HEIGHT: 67 IN | BODY MASS INDEX: 28.41 KG/M2 | HEART RATE: 83 BPM | OXYGEN SATURATION: 98 % | SYSTOLIC BLOOD PRESSURE: 128 MMHG | DIASTOLIC BLOOD PRESSURE: 84 MMHG

## 2021-08-31 DIAGNOSIS — I26.99 BILATERAL PULMONARY EMBOLISM (HCC): Primary | ICD-10-CM

## 2021-08-31 DIAGNOSIS — R09.02 HYPOXIA: ICD-10-CM

## 2021-08-31 DIAGNOSIS — Z96.651 STATUS POST TOTAL KNEE REPLACEMENT, RIGHT: ICD-10-CM

## 2021-08-31 PROCEDURE — 99213 OFFICE O/P EST LOW 20 MIN: CPT | Performed by: FAMILY MEDICINE

## 2021-08-31 RX ORDER — SENNOSIDES 8.6 MG
650 CAPSULE ORAL EVERY 8 HOURS PRN
COMMUNITY

## 2021-08-31 NOTE — OUTREACH NOTE
Call Center TCM Note      Responses   [facility] patient discharged from?  [place]   Does the patient have one of the following disease processes/diagnoses(primary or secondary)?  Other   TCM attempt successful?  Yes   Call start time  0941   Call end time  0942   Discharge diagnosis  Bilateral pulmonary embolism    Person spoke with today (if not patient) and relationship  patient   Meds reviewed with patient/caregiver?  Yes   Is the patient having any side effects they believe may be caused by any medication additions or changes?  No   Does the patient have all medications ordered at discharge?  Yes   Is the patient taking all medications as directed (includes completed medication regime)?  Yes   Does the patient have a primary care provider?   Yes   Does the patient have an appointment with their PCP within 7 days of discharge?  Yes   Comments regarding PCP  hospital [place] [facility] appt on 8/31/21 at 3:45 PM   Has the patient kept scheduled appointments due by today?  N/A   Psychosocial issues?  No   Did the patient receive a copy of their discharge instructions?  Yes   Nursing interventions  Reviewed instructions with patient   What is the patient's perception of their health status since discharge?  Improving   Is the patient/caregiver able to teach back signs and symptoms related to disease process for when to call PCP?  Yes   Is the patient/caregiver able to teach back signs and symptoms related to disease process for when to call 911?  Yes   Is the patient/caregiver able to teach back the hierarchy of who to call/visit for symptoms/problems? PCP, Specialist, Home health nurse, Urgent Care, ED, 911  Yes   If the patient is a current smoker, are they able to teach back resources for cessation?  Not a smoker   TCM call completed?  Yes   Wrap up additional comments  Pt states she is doing well. No questions/concerns.          Swetha Quintana RN    8/31/2021, 09:42 EDT

## 2021-09-01 DIAGNOSIS — M25.561 CHRONIC PAIN OF RIGHT KNEE: Primary | ICD-10-CM

## 2021-09-01 DIAGNOSIS — G89.29 CHRONIC PAIN OF RIGHT KNEE: Primary | ICD-10-CM

## 2021-09-01 NOTE — PROGRESS NOTES
"Chief Complaint  Transitional Care Management    Subjective          Ann Marie Malik presents to [facility] [place]  History of Present Illness    Patient presents today for hospital follow-up.  Had right total knee arthroplasty on 8/23/2021.  While at physical therapy as outpatient after hospital discharge, it was noted that patient had low oxygen saturations.  She was advised to go to the emergency department on 8/27/2021.  There she had CTA of the chest and was found to have bilateral pulmonary embolisms.  She was starting on Eliquis 10 mg twice daily, and has continued on this medication.  She continues to require supplemental oxygen.  Currently at 2 L/min via nasal cannula.  Oxygen saturations with supplemental oxygen are between 95 and 99%.  With without supplementation, at rest oxygen saturations can stay around 90.  With activity and no oxygen supplementation, saturations are still dropping down into the upper 80s.  Patient says that she is doing some better.  She returns to therapy this week.  She has follow-up with orthopedic surgery next week.  Patient is tolerating Eliquis okay, but prescription is very expensive.  Patient is continuing to use pain medication, 1 to 3 tablets daily.  Having some constipation with opioid pain medication.    Objective   Vital Signs:   /84   Pulse 83   Ht 170.2 cm (67\")   Wt 82.1 kg (181 lb)   SpO2 98%   BMI 28.35 kg/m²     Physical Exam  Vitals reviewed.   Constitutional:       General: She is not in acute distress.     Appearance: She is well-developed.   Cardiovascular:      Rate and Rhythm: Normal rate and regular rhythm.      Heart sounds: Normal heart sounds. No murmur heard.     Pulmonary:      Effort: Pulmonary effort is normal. No respiratory distress.      Breath sounds: Normal breath sounds. No wheezing or rales.   Abdominal:      Palpations: Abdomen is soft.      Tenderness: There is no abdominal " "tenderness.   Skin:     General: Skin is warm and dry.      Comments: Staples in place, right knee incision clean dry and intact   Neurological:      Mental Status: She is alert and oriented to person, place, and time.        Result Review :   The following data was reviewed by: Verito He MD on 08/31/2021:  Common labs    Common Labsle 8/28/21 8/28/21 8/28/21 8/29/21 8/30/21    0532 0532 1848     Glucose  110 (A)   101 (A)   BUN  11   11   Creatinine  0.72   0.72   eGFR Non African Am  81   81   Sodium  138   139   Potassium  3.3 (A) 4.2 4.5 3.9   Chloride  102   102   Calcium  8.9   9.3   WBC 5.48       Hemoglobin 11.2 (A)       Hematocrit 33.8 (A)       Platelets 236       (A) Abnormal value            CTA chest 8/27/2021  \"LUNGS/PLEURA/AIRWAYS: There is a calcified nodule in the right  lower lobe likely due to old granulomatous disease. There is  centrilobular emphysema. The lungs otherwise appear clear.  MEDIASTINUM, ALEJA AND LYMPH NODES: The mediastinum, aleja and  lymph nodes are normal.  HEART AND PERICARDIUM: The heart and pericardium are normal.  UPPER ABDOMEN: Unremarkable  OSSEOUS STRUCTURES: Unremarkable.     IMPRESSION:  Multiple small bilateral segmental and smaller filling defects in  the pulmonary arteries consistent with pulmonary emboli.\"                  Assessment and Plan    Diagnoses and all orders for this visit:    1. Bilateral pulmonary embolism (CMS/HCC) (Primary)  -     apixaban (ELIQUIS) 5 MG tablet tablet; Take 1 tablet by mouth Every 12 (Twelve) Hours. Lot: [id number], Exp: 03/23  Dispense: 84 tablet; Refill: 0    2. Hypoxia    3. Status post total knee replacement, right      Patient presents today after hospital follow-up.  Has bilateral pulmonary embolism.  On Eliquis 10 mg twice daily, plan to switch to 5 mg twice daily on 9/3/2021.  Samples of Eliquis provided, and patient has coupon to get another month prescription.  She has follow-up with cardio thoracic surgery on " 9/13/2021.  Still requiring supplemental oxygen, continue to wean as tolerated.  Patient monitoring her oxygen saturations at home.    Right total knee replacement doing well.  Range of motion is good, plan to restart physical therapy as outpatient.  Skin incision is healing well without any signs of infection.  Patient has follow-up with orthopedic surgery next week.        Follow Up   Return for Next scheduled follow up.  Patient was given instructions and counseling regarding her condition or for health maintenance advice. Please see specific information pulled into the AVS if appropriate.         This document has been electronically signed by Verito He MD

## 2021-09-02 ENCOUNTER — HOSPITAL ENCOUNTER (OUTPATIENT)
Dept: PHYSICAL THERAPY | Facility: HOSPITAL | Age: 66
Setting detail: THERAPIES SERIES
Discharge: HOME | End: 2021-09-02

## 2021-09-02 DIAGNOSIS — M17.11 PRIMARY OSTEOARTHRITIS OF RIGHT KNEE: Primary | ICD-10-CM

## 2021-09-02 DIAGNOSIS — M71.21 SYNOVIAL CYST OF RIGHT POPLITEAL SPACE: ICD-10-CM

## 2021-09-02 PROCEDURE — 97162 PT EVAL MOD COMPLEX 30 MIN: CPT | Performed by: PHYSICAL THERAPIST

## 2021-09-02 PROCEDURE — 97110 THERAPEUTIC EXERCISES: CPT | Performed by: PHYSICAL THERAPIST

## 2021-09-02 PROCEDURE — G0283 ELEC STIM OTHER THAN WOUND: HCPCS | Performed by: PHYSICAL THERAPIST

## 2021-09-02 NOTE — THERAPY EVALUATION
Outpatient Physical Therapy Ortho Initial Evaluation  [facility]     Patient Name: Ann Marie Malik  : 1955  MRN: [id number]  Today's Date: 2021      Visit Date: 2021  Visit   Return to MD: 21  Re-cert date: 21  Patient Active Problem List   Diagnosis   • Chronic pain of right knee   • Primary osteoarthritis of right knee   • Internal derangement of right knee   • Degenerative tear of lateral meniscus of right knee   • Chondromalacia of right knee   • Synovial cyst of right popliteal space   • Cerebrovascular accident (CVA) (CMS/HCC)   • Complex tear of lateral meniscus of right knee as current injury   • Bilateral pulmonary embolism (CMS/HCC)   • Hypoxia        Past Medical History:   Diagnosis Date   • GERD (gastroesophageal reflux disease)    • Hyperlipidemia    • Hypertension    • Osteoarthritis    • Pulmonary emboli (CMS/HCC)    • Stroke (CMS/HCC)         Past Surgical History:   Procedure Laterality Date   • TOTAL KNEE ARTHROPLASTY Right 2021       Visit Dx:     ICD-10-CM ICD-9-CM   1. Primary osteoarthritis of right knee  M17.11 715.16   2. Synovial cyst of right popliteal space  M71.21 727.51     Medications (Admitted on 2021)     acetaminophen (TYLENOL) 650 MG 8 hr tablet  albuterol sulfate  (90 Base) MCG/ACT inhaler  apixaban (ELIQUIS) 5 MG tablet tablet  apixaban (ELIQUIS) 5 MG tablet tablet  apixaban (ELIQUIS) 5 MG tablet tablet  atorvastatin (LIPITOR) 40 MG tablet  hydroCHLOROthiazide (HYDRODIURIL) 25 MG tablet  Multiple Vitamins-Minerals (CENTRUM SILVER 50+WOMEN PO)  omeprazole (priLOSEC) 20 MG capsule  oxyCODONE-acetaminophen (PERCOCET) 7.5-325 MG per tablet  valsartan (Diovan) 160 MG tablet  VITAMIN E PO     Allergies      Adhesive TapeOther (See Comments)   Wound Dressing AdhesiveRash      PT Ortho     Row Name 21 0927       Subjective Comments    Subjective Comments  64 yo female presenting to skilled PT re-admitted for R knee pain  s/p R TKA oin 8/23/21. Recently hospitalized for 3 nights due to bilateral pulmonary emboli from 8/27/21-8/30/21 at [facility].  -BS       Precautions and Contraindications    Precautions/Limitations  no known precautions/limitations  -BS    Precautions  Monitor BP PRN- hx of CVA without residual effects   -BS       Subjective Pain    Able to rate subjective pain?  yes  -BS    Pre-Treatment Pain Level  3  -BS    Post-Treatment Pain Level  2  -BS       Posture/Observations    Posture/Observations Comments  mod edema R knee, ecchymosis medial R thigh  -BS       General ROM    GENERAL ROM COMMENTS  0-145° R knee; L knee; 0-101°  -BS       MMT (Manual Muscle Testing)    General MMT Comments  L LE grossly 5/5 R LE-hip flex 4-/5 knee ext 4-/5 knee flex 4/5 ankle DF 5/5   -BS       Sensation    Light Touch  Partial deficits in the RLE numb along lat R knee  -BS    Additional Comments  intact fine motor coodination B LE's  -BS       Gait/Stairs (Locomotion)    Comment (Gait/Stairs)  Fanny gait training with reciprocal step through pattern with RW; close SBA gait training x 50' with SC with step to gait pattern with occasional cues for sequencing  -BS      User Key  (r) = Recorded By, (t) = Taken By, (c) = Cosigned By    Initials Name Provider Type    Kishan Poon, PT Physical Therapist                            PT OP Goals     Row Name 09/02/21 0927          PT Short Term Goals    STG Date to Achieve  09/15/21  -BS     STG 1  AROM knee ext 0 degrees   -BS     STG 1 Progress  Met  -BS     STG 2  AROM knee flexion 110 degrees   -BS     STG 2 Progress  New  -BS     STG 3  Ambulate with SC community distances   -BS     STG 3 Progress  New  -BS     STG 4  Independent SLR without lag   -BS     STG 4 Progress  Ongoing  -BS        Long Term Goals    LTG Date to Achieve  09/30/21  -BS     LTG 1  AROM R knee 0-120 or better   -BS     LTG 1 Progress  Ongoing  -BS     LTG 2  Ambulate community distances independent of AD  "safely '  -BS     LTG 2 Progress  Ongoing  -BS     LTG 3  R Knee MMT 5/5   -BS     LTG 3 Progress  Ongoing  -BS     LTG 4  SLS RLE 3\" or better on level ground   -BS     LTG 4 Progress  Ongoing  -BS     LTG 5  Improve LEFS score to 50 or higher  -BS     LTG 5 Progress  New  -BS        Time Calculation    PT Goal Re-Cert Due Date  09/23/21  -BS       User Key  (r) = Recorded By, (t) = Taken By, (c) = Cosigned By    Initials Name Provider Type    Kishan Poon, PT Physical Therapist          PT Assessment/Plan     Row Name 09/02/21 1027          PT Assessment    Functional Limitations  Impaired gait;Limitations in functional capacity and performance;Performance in leisure activities;Limitations in community activities;Limitation in home management  -BS     Impairments  Balance;Gait;Pain;Muscle strength;Range of motion;Posture;Edema  -BS     Assessment Comments  66 yo female re-admitted s/p R TKA on 8/23/21 with recent 3 night hospitalization for bilateral PE. Presents with R knee flex ROM loss, R knee and hip weakness, R knee pain, R knee edema and altered gait mechanics with use of a SPC. Would benefit from continuing skilled PT to improved ROM/MMT of the R knee and improve gait mechanics to return to PLOF.  -BS     Rehab Potential  Good  -BS     Patient/caregiver participated in establishment of treatment plan and goals  Yes  -BS     Patient would benefit from skilled therapy intervention  Yes  -BS        PT Plan    PT Frequency  2x/week  -BS     Predicted Duration of Therapy Intervention (PT)  6-8 weeks  -BS     Planned CPT's?  PT RE-EVAL: 37828;PT THER PROC EA 15 MIN: 31243;PT THER ACT EA 15 MIN: 46295;PT MANUAL THERAPY EA 15 MIN: 36404;PT NEUROMUSC RE-EDUCATION EA 15 MIN: 03840;PT GAIT TRAINING EA 15 MIN: 77127;PT HOT OR COLD PACK TREAT MCARE;PT ELECTRICAL STIM UNATTEND: ;PT THER SUPP EA 15 MIN  -BS     Physical Therapy Interventions (Optional Details)  balance training;gait training;home exercise " program;joint mobilization;manual therapy techniques;modalities;neuromuscular re-education;patient/family education;postural re-education;ROM (Range of Motion);strengthening;stretching;transfer training  -BS     PT Plan Comments  Continue to push R knee flex ROM, quad strengthening. Add step ups, TKE w/ TB.   -BS       User Key  (r) = Recorded By, (t) = Taken By, (c) = Cosigned By    Initials Name Provider Type    Kishan Poon, PT Physical Therapist          Modalities     Row Name 09/02/21 0927             Ice    Ice Applied  Yes 15'  -BS      Location  R knee with IFC elevated   -BS      Ice S/P Rx  Yes  -BS         ELECTRICAL STIMULATION    Attended/Unattended  Unattended  -BS      Stimulation Type  IFC  -BS      Location/Electrode Placement/Other  R knee with ice elevated   -BS      PT E-Stim Unattended Minutes  15  -BS        User Key  (r) = Recorded By, (t) = Taken By, (c) = Cosigned By    Initials Name Provider Type    Kishan Poon, PT Physical Therapist        OP Exercises     Row Name 09/02/21 0927             Precautions    Existing Precautions/Restrictions  other (see comments) recent bilat PE  -BS         Subjective Comments    Subjective Comments  66 yo female presenting to skilled PT re-admitted for R knee pain s/p R TKA oin 8/23/21. Recently hospitalized for 3 nights due to bilateral pulmonary emboli from 8/27/21-8/30/21 at [facility].  -BS         Subjective Pain    Able to rate subjective pain?  yes  -BS      Pre-Treatment Pain Level  3  -BS      Post-Treatment Pain Level  2  -BS         Exercise 1    Exercise Name 1  pro ll LE ROM (able to make full circles)  -BS      Time 1  10 mins  -BS      Additional Comments  L1  -BS         Exercise 2    Exercise Name 2  supine R hip abduction AROM  -BS      Sets 2  1  -BS      Reps 2  15  -BS         Exercise 3    Exercise Name 3  long sitting R heel slides w/ strap  -BS      Sets 3  1  -BS      Reps 3  15  -BS         Exercise 4    Exercise  Name 4  R SAQ's  -BS      Sets 4  1  -BS      Reps 4  20  -BS         Exercise 5    Exercise Name 5  LAQ's, R  -BS      Sets 5  1  -BS      Reps 5  10  -BS         Exercise 6    Exercise Name 6  seated R knee flex AAROM  -BS      Sets 6  1  -BS      Reps 6  7  -BS         Exercise 7    Exercise Name 7  see modalities  -BS         Exercise 8    Exercise Name 8  attempted SLR   -BS      Sets 8  1  -BS      Reps 8  3 reps  -BS      Additional Comments  extension lag noted R LE, poor LE clearance  -BS        User Key  (r) = Recorded By, (t) = Taken By, (c) = Cosigned By    Initials Name Provider Type    Kishan Poon, PT Physical Therapist                        Outcome Measure Options: Lower Extremity Functional Scale (LEFS)  Lower Extremity Functional Index  Any of your usual work, housework or school activities: Quite a bit of difficulty  Your usual hobbies, recreational or sporting activities: Quite a bit of difficulty  Getting into or out of the bath: Extreme difficulty or unable to perform activity  Walking between rooms: A little bit of difficulty  Putting on your shoes or socks: A little bit of difficulty  Squatting: Quite a bit of difficulty  Lifting an object, like a bag of groceries from the floor: A little bit of difficulty  Performing light activities around your home: A little bit of difficulty  Performing heavy activities around your home: A little bit of difficulty  Getting into or out of a car: A little bit of difficulty  Walking 2 blocks: Extreme difficulty or unable to perform activity  Walking a mile: Extreme difficulty or unable to perform activity  Going up or down 10 stairs (about 1 flight of stairs): A little bit of difficulty  Standing for 1 hour: Extreme difficulty or unable to perform activity  Sitting for 1 hour: Moderate difficulty  Running on even ground: Extreme difficulty or unable to perform activity  Running on uneven ground: Extreme difficulty or unable to perform activity  Making  sharp turns while running fast: Extreme difficulty or unable to perform activity  Hopping: Extreme difficulty or unable to perform activity  Rolling over in bed: Quite a bit of difficulty  Total: 27      Time Calculation:     Start Time: 0927  Stop Time: 1037  Time Calculation (min): 70 min  PT Non-Billable Time (min): 15 min  Total Timed Code Minutes- PT: 55 minute(s)  Untimed Charges  PT E-Stim Unattended Minutes: 15  Total Minutes  Untimed Charges Total Minutes: 15   Total Minutes: 15     Therapy Charges for Today     Code Description Service Date Service Provider Modifiers Qty    46876577930 HC PT EVAL MOD COMPLEXITY 4 9/2/2021 Kishan Castro, PT GP 1    96256345487 HC PT THER PROC EA 15 MIN 9/2/2021 Kishan Castro, PT GP 1    29710527665 HC PT ELECTRICAL STIM UNATTENDED 9/2/2021 Kishan Castro, PT  1          PT G-Codes  Outcome Measure Options: Lower Extremity Functional Scale (LEFS)  Total: 27         Kishan Castro PT  9/2/2021

## 2021-09-07 ENCOUNTER — APPOINTMENT (OUTPATIENT)
Dept: PHYSICAL THERAPY | Facility: HOSPITAL | Age: 66
End: 2021-09-07

## 2021-09-07 ENCOUNTER — HOSPITAL ENCOUNTER (OUTPATIENT)
Dept: PHYSICAL THERAPY | Facility: HOSPITAL | Age: 66
Setting detail: THERAPIES SERIES
Discharge: HOME | End: 2021-09-07

## 2021-09-07 ENCOUNTER — OFFICE VISIT (OUTPATIENT)
Dept: ORTHOPEDIC SURGERY | Facility: CLINIC | Age: 66
End: 2021-09-07

## 2021-09-07 VITALS
DIASTOLIC BLOOD PRESSURE: 78 MMHG | HEIGHT: 67 IN | HEART RATE: 83 BPM | BODY MASS INDEX: 27.78 KG/M2 | SYSTOLIC BLOOD PRESSURE: 110 MMHG | WEIGHT: 177 LBS | OXYGEN SATURATION: 97 %

## 2021-09-07 DIAGNOSIS — G89.29 CHRONIC PAIN OF RIGHT KNEE: Primary | ICD-10-CM

## 2021-09-07 DIAGNOSIS — M17.11 PRIMARY OSTEOARTHRITIS OF RIGHT KNEE: Primary | ICD-10-CM

## 2021-09-07 DIAGNOSIS — M23.91 INTERNAL DERANGEMENT OF RIGHT KNEE: ICD-10-CM

## 2021-09-07 DIAGNOSIS — Z74.09 IMPAIRED MOBILITY AND ACTIVITIES OF DAILY LIVING: ICD-10-CM

## 2021-09-07 DIAGNOSIS — Z96.651 PRESENCE OF TOTAL KNEE JOINT PROSTHESIS, RIGHT: ICD-10-CM

## 2021-09-07 DIAGNOSIS — M25.561 CHRONIC PAIN OF RIGHT KNEE: Primary | ICD-10-CM

## 2021-09-07 DIAGNOSIS — Z78.9 IMPAIRED MOBILITY AND ACTIVITIES OF DAILY LIVING: ICD-10-CM

## 2021-09-07 DIAGNOSIS — M71.21 SYNOVIAL CYST OF RIGHT POPLITEAL SPACE: ICD-10-CM

## 2021-09-07 DIAGNOSIS — Z74.09 IMPAIRED FUNCTIONAL MOBILITY, BALANCE, GAIT, AND ENDURANCE: ICD-10-CM

## 2021-09-07 DIAGNOSIS — M17.11 PRIMARY OSTEOARTHRITIS OF RIGHT KNEE: ICD-10-CM

## 2021-09-07 PROCEDURE — 99024 POSTOP FOLLOW-UP VISIT: CPT | Performed by: ORTHOPAEDIC SURGERY

## 2021-09-07 PROCEDURE — 97110 THERAPEUTIC EXERCISES: CPT

## 2021-09-07 RX ORDER — OXYCODONE AND ACETAMINOPHEN 7.5; 325 MG/1; MG/1
1 TABLET ORAL EVERY 6 HOURS PRN
Qty: 30 TABLET | Refills: 0 | Status: SHIPPED | OUTPATIENT
Start: 2021-09-07 | End: 2021-09-20 | Stop reason: SDUPTHER

## 2021-09-07 NOTE — PROGRESS NOTES
"Ann Marie Malik : 1955 MRN: [id number] DATE: 9/10/2021    Chief Complaint:  Chief Complaint   Patient presents with   • Right Knee - Post-op        21 (2w 1d) Chris Covington MD    RIGHT TOTAL KNEE ARTHROPLASTY  with adductor canal block - Right         SUBJECTIVE:Patient returns today for 2 week follow up of right total knee replacement. Patient reports doing well with no unusual complaints. Appears to be progressing appropriately.    OBJECTIVE:   Vitals:    21 0902   BP: 110/78   Pulse: 83   SpO2: 97%   Weight: 80.3 kg (177 lb)   Height: 170.2 cm (67\")       Exam:. The incision is healing appropriately. No sign of infection.   Range of motion:   Flexion:115  Extension:0  The calf is soft and nontender with a negative Homans sign.      DIAGNOSTIC STUDIES  Xrays:    Adult Transthoracic Echo Complete W/ Cont if Necessary Per Protocol    Result Date: 2021  Narrative: · The study is technically difficult for diagnosis. · Estimated left ventricular EF = 63% Left ventricular ejection fraction appears to be 61 - 65%. Left ventricular systolic function is normal. · Left ventricular diastolic function is consistent with (grade I) impaired relaxation. · The right ventricular cavity is moderately dilated. · Mildly reduced right ventricular systolic function noted. · The right atrial cavity is mildly dilated. · Saline test results are positive. · Moderate tricuspid valve regurgitation is present. RVSP 46 mmHg      XR Chest 2 View    Result Date: 2021  Narrative: TWO VIEW CHEST HISTORY: Shortness of breath. Recent right knee replacement. Frontal and lateral films of the chest were obtained. COMPARISON: 2020 FINDINGS:  EKG leads. The lungs are clear of an acute process. Linear scarring right upper lobe. Old granulomatous disease is present. The heart is not enlarged. The pulmonary vasculature is not increased. No pleural effusion. No pneumothorax. No acute osseous abnormality. " Minimal degenerative changes are present in the thoracic spine.     Impression: CONCLUSION: No Acute Disease [id number] Electronically signed by:  Srinivas Butts MD  8/27/2021 1:51 PM CDT Workstation: [id number]    XR Knee 1 or 2 View Right    Result Date: 9/7/2021  Narrative: Ordering Provider:  Chris Covington MD Ordering Diagnosis/Indication:  Chronic pain of right knee Procedure:  XR KNEE 1 OR 2 VW RIGHT Exam Date:  9/7/21    COMPARISON:  Todays X-rays were compared to previous images dated August 23, 2021.      Impression:  AP bilateral standing of the knees with lateral of the right knee show acceptable position and alignment of a right total knee arthroplasty.  No sign of implant loosening or failure is noted.  Appropriate sizing noted on both views.  No interval change noted in comparison to prior x-ray.     Chris Covington MD 9/7/21 Chris Covington MD 9/7/21     XR Knee 1 or 2 View Right    Result Date: 8/23/2021  Narrative: EXAM DESCRIPTION:  XR KNEE 1 OR 2 VW RIGHT CLINICAL HISTORY: 65 years  Female  implant , M25.561 Pain in right knee G89.29 Other chronic pain M23.91 Unspecified internal derangement of right knee M17.11 Unilateral primary osteoarthritis, right knee M94.261 Chondromalacia, right knee M71.21 Synovial cyst of popliteal space (Baker), right knee S83.271D Complex tear of lateral meniscus, current injury, right knee, subsequent encounter: Post-op knee arthroplasty COMPARISON: May 5, 2021 TECHNIQUE: Two views-postoperative images of the right knee in the AP and lateral projection. FINDINGS: Images demonstrating total arthroplasty of the right knee with expected postoperative appearance. There is no radiographic evidence of complication of the hardware. The alignment is anatomic. Surgical drain and skin staples are noted.     Impression: 1. Postoperative images of the right knee demonstrating total arthroplasty with expected postoperative appearance as above. Electronically signed  by:  Mayuri Ma MD  8/23/2021 9:58 AM CDT Workstation: [id number]    XR Knee Bilateral AP Standing    Result Date: 9/7/2021  Narrative: Ordering Provider:  Chris Covington MD Ordering Diagnosis/Indication:  Chronic pain of right knee Procedure:  XR KNEE BILATERAL AP STANDING Exam Date:  9/7/21 COMPARISON:  Todays X-rays were compared to previous images dated August 23, 2021.     Impression:  AP bilateral standing of the knees with lateral of the right knee show acceptable position and alignment of a right total knee arthroplasty.  No sign of implant loosening or failure is noted.  Appropriate sizing noted on both views.  No interval change noted in comparison to prior x-ray. Chris Covington MD 9/7/21     Peripheral Block    Result Date: 8/23/2021  Narrative: Zaira Quick CRNA     8/23/2021  7:38 AM Peripheral Block Patient reassessed immediately prior to procedure Patient location during procedure: OR Start time: 8/23/2021 7:10 AM Stop time: 8/23/2021 7:16 AM Reason for block: at surgeon's request and post-op pain management Performed by CRNA: Zaira Quick CRNA Assisted by: Jany Tejada RN Preanesthetic Checklist Completed: patient identified, IV checked, site marked, risks and benefits discussed, surgical consent, monitors and equipment checked, pre-op evaluation and timeout performed Prep: Sterile barriers:cap, gloves, mask and sterile barriers Prep: ChloraPrep Patient monitoring: blood pressure monitoring, continuous pulse oximetry and EKG Procedure Sedation:yes Guidance:ultrasound guided ULTRASOUND INTERPRETATION.  Using ultrasound guidance a 22 G gauge needle was placed in close proximity to the femoral nerve, at which point, under ultrasound guidance anesthetic was injected in the area of the nerve and spread of the anesthesia was seen on ultrasound in close proximity thereto.  There were no abnormalities seen on ultrasound; a digital image was taken; and the patient tolerated  the procedure with no complications. Images:still images obtained Laterality:right Block Type:adductor canal block Injection Technique:single-shot Needle Type:echogenic Needle Gauge:22 G Medications Used: bupivacaine (PF) (MARCAINE) 0.5 % injection, 30 mL Med admintered at 8/23/2021 7:16 AM Post Assessment Injection Assessment: negative aspiration for heme, incremental injection and no paresthesia on injection Patient Tolerance:comfortable throughout block Complications:no     US Venous Doppler Lower Extremity Right (duplex)    Result Date: 8/27/2021  Narrative: Doppler duplex venous examination right lower extremity. CLINICAL INDICATION: Right knee pain. Shortness of breath. Recent knee surgery COMPARISON: None FINDINGS: The common femoral,  femoral, and popliteal veins of the right    lower extremity are well identified and compress normally. Doppler signals are heard either spontaneously or on distal compression.  No evidence of intraluminal thrombus was noted. The visualized posterior calf veins are normal.     Impression: No evidence of deep venous thrombosis in the common femoral, femoral, or popliteal veins of the right     lower extremity. Electronically signed by:  Andrew Ray MD  8/27/2021 1:01 PM CDT Workstation: [id number]    CT Angiogram Chest    Result Date: 8/27/2021  Narrative: PROCEDURE: CT/CTA THORAX/PULMONARY WITH CONTRAST CLINICAL INFORMATION:  Elevated D dimer, SOB, s/p TKA 8/23   TECHNIQUE: Axial images were obtained and multiplanar reconstructions were made.  This exam was performed using radiation doses that are as low as reasonably achievable (ALARA). This exam was performed according to our departmental dose optimization program, which includes automated exposure control, adjustment of the mA and/or KV according to patient size and/or use of iterative reconstruction technique. CONTRAST:   57 cc intravenous Isovue 370 COMPARISON: None available. Note: Reconstructed MIP images were  obtained in multiple obliquities. No 3-D surface rendered images were obtained for this exam. FINDINGS: PULMONARY CTA: Multiple small bilateral segmental and smaller filling defects in the pulmonary arteries consistent with pulmonary emboli. OTHER VASCULAR: Unremarkable LUNGS/PLEURA/AIRWAYS: There is a calcified nodule in the right lower lobe likely due to old granulomatous disease. There is centrilobular emphysema. The lungs otherwise appear clear. MEDIASTINUM, JOSHUA AND LYMPH NODES: The mediastinum, joshua and lymph nodes are normal. HEART AND PERICARDIUM: The heart and pericardium are normal. UPPER ABDOMEN: Unremarkable OSSEOUS STRUCTURES: Unremarkable.     Impression: Multiple small bilateral segmental and smaller filling defects in the pulmonary arteries consistent with pulmonary emboli. Critical results discussed with AURA DIANA on 8/27/2021 1:35 PM CDT Electronically signed by:  Alexandro Alvarez MD  8/27/2021 1:37 PM CDT Workstation: [id number]        ASSESSMENT: 2 week status post right knee replacement.    PLAN: 1) Staples removed and steri strips applied   2) Continue PT   3) Discontinue TD hose   4) Continue ice PRN   5) eliquis secondary to PE for per vascular service. total postop   6) Follow up in 4 weeks       09/10/21 at 09:02 CDT by Chris Covington MD

## 2021-09-07 NOTE — THERAPY TREATMENT NOTE
Outpatient Physical Therapy Ortho Treatment Note  [facility]     Patient Name: Ann Marie Malik  : 1955  MRN: [id number]  Today's Date: 2021      Visit Date: 2021     Subjective Improvement: 25%  Attendance:   (medicare)  Next MD Visit : 2021  Recert Date:  2021        Therapy Diagnosis:  S/P R TKA 2021    Visit Dx:    ICD-10-CM ICD-9-CM   1. Primary osteoarthritis of right knee  M17.11 715.16   2. Synovial cyst of right popliteal space  M71.21 727.51       Patient Active Problem List   Diagnosis   • Chronic pain of right knee   • Primary osteoarthritis of right knee   • Internal derangement of right knee   • Degenerative tear of lateral meniscus of right knee   • Chondromalacia of right knee   • Synovial cyst of right popliteal space   • Cerebrovascular accident (CVA) (CMS/HCC)   • Complex tear of lateral meniscus of right knee as current injury   • Bilateral pulmonary embolism (CMS/HCC)   • Hypoxia        Past Medical History:   Diagnosis Date   • GERD (gastroesophageal reflux disease)    • Hyperlipidemia    • Hypertension    • Osteoarthritis    • Pulmonary emboli (CMS/HCC)    • Stroke (CMS/HCC)         Past Surgical History:   Procedure Laterality Date   • TOTAL KNEE ARTHROPLASTY Right 2021       PT Ortho     Row Name 21 0800       Subjective Comments    Subjective Comments  patient reports that her knee is starting to feel not as stiff; reports that she is trying to ween herself from her O2 and is doing well but keeps it with her; Took pain pill around 0745 this morning; reports that she is only taking pain pills at night and in the morning; Sees MD today at 0900  -       Precautions and Contraindications    Precautions/Limitations  no known precautions/limitations  -    Precautions  Monitor BP PRN- hx of CVA without residual effects   -       Posture/Observations    Posture/Observations Comments  staples intact; minimal swelling; gati with RW   -       General ROM    GENERAL ROM COMMENTS  0-115° AROM  -      User Key  (r) = Recorded By, (t) = Taken By, (c) = Cosigned By    Initials Name Provider Type    Stephanie Hameed PTA Physical Therapy Assistant                      PT Assessment/Plan     Row Name 09/07/21 0800          PT Assessment    Assessment Comments  MD note sent with patient this date; progressing very well with ROM; Lacking with quad strength and LE R strength but gait is progressing well; was able to do the entire treatment without 02 this date and 02 stayed about 90%; decreased treatment secondary to pt seeing MD at 0900 this date  -        PT Plan    PT Frequency  2x/week  -LIAN     Predicted Duration of Therapy Intervention (PT)  6-8 weeks  -     PT Plan Comments  Resume IFC and ice next visit; Progress gait with cane and standing CKC next visit; Monitor 02  -KH       User Key  (r) = Recorded By, (t) = Taken By, (c) = Cosigned By    Initials Name Provider Type    Stephanie Hameed PTA Physical Therapy Assistant          Modalities     Row Name 09/07/21 0800             Subjective Pain    Post-Treatment Pain Level  2  -         Ice    Patient reports will apply ice at home to involved area  -- ice  to go  -        User Key  (r) = Recorded By, (t) = Taken By, (c) = Cosigned By    Initials Name Provider Type    Stephanie Hameed PTA Physical Therapy Assistant        OP Exercises     Row Name 09/07/21 0800             Precautions    Existing Precautions/Restrictions  other (see comments) recent bilat PE  -         Subjective Comments    Subjective Comments  patient reports that her knee is starting to feel not as stiff; reports that she is trying to ween herself from her O2 and is doing well but keeps it with her; Took pain pill around 0745 this morning; reports that she is only taking pain pills at night and in the morning; Sees MD today at 0900  -         Subjective Pain    Able to rate subjective pain?  yes  -LIAN       Pre-Treatment Pain Level  2  -KH      Post-Treatment Pain Level  2  -KH         Total Minutes    01153 - PT Therapeutic Exercise Minutes  41  -KH         Exercise 1    Exercise Name 1  pro ll LE ROM O2 prior to pro ll after walking to back is 92%  -KH      Time 1  10 mins  -KH      Additional Comments  level 1; seat 10  -KH         Exercise 2    Exercise Name 2  LAQ 95% after pro ll  -KH      Sets 2  2  -KH      Reps 2  10  -KH         Exercise 3    Exercise Name 3  ham culrs with tband  -KH      Cueing 3  Verbal  -KH      Sets 3  2  -KH      Reps 3  10  -KH      Additional Comments  green  -KH         Exercise 4    Exercise Name 4  seated hip flexin with 1# aw  -KH      Sets 4  2  -KH      Reps 4  10  -KH         Exercise 5    Exercise Name 5  hip abd with tband  -KH      Cueing 5  Verbal  -KH      Sets 5  2  -KH      Reps 5  10  -KH      Additional Comments  green  -KH         Exercise 6    Exercise Name 6  SLR  -KH      Sets 6  2  -KH      Reps 6  10  -KH         Exercise 7    Exercise Name 7  heel slides with strap  -KH      Reps 7  20  -KH        User Key  (r) = Recorded By, (t) = Taken By, (c) = Cosigned By    Initials Name Provider Type    Stephanie Hameed PTA Physical Therapy Assistant                       PT OP Goals     Row Name 09/07/21 0800          PT Short Term Goals    STG Date to Achieve  09/15/21  -KH     STG 1  AROM knee ext 0 degrees   -KH     STG 1 Progress  Met  -KH     STG 2  AROM knee flexion 110 degrees   -KH     STG 2 Progress  (S) Met  -KH     STG 3  Ambulate with SC community distances   -KH     STG 3 Progress  Progressing  -KH     STG 4  Independent SLR without lag   -KH     STG 4 Progress  Ongoing  -KH     STG 4 Progress Comments  5 degree lag today   -KH        Long Term Goals    LTG Date to Achieve  09/30/21  -KH     LTG 1  AROM R knee 0-120 or better   -KH     LTG 1 Progress  Ongoing  -KH     LTG 2  Ambulate community distances independent of AD safely '  -KH     LTG 2 Progress   "Ongoing  -     LTG 3  R Knee MMT 5/5   -     LTG 3 Progress  Ongoing  -     LTG 4  SLS RLE 3\" or better on level ground   -     LTG 4 Progress  Ongoing  -     LTG 5  Improve LEFS score to 50 or higher  -     LTG 5 Progress  New  -        Time Calculation    PT Goal Re-Cert Due Date  09/23/21  -       User Key  (r) = Recorded By, (t) = Taken By, (c) = Cosigned By    Initials Name Provider Type    Stephanie Hameed PTA Physical Therapy Assistant                         Time Calculation:   Start Time: 0758  Stop Time: 0839  Time Calculation (min): 41 min  Timed Charges  73798 - PT Therapeutic Exercise Minutes: 41  Total Minutes  Timed Charges Total Minutes: 41   Total Minutes: 41  Therapy Charges for Today     Code Description Service Date Service Provider Modifiers Qty    11680739718 HC PT THER PROC EA 15 MIN 9/7/2021 Stephanie Amanda PTA GP 3                    tSephanie Amanda PTA  9/7/2021     "

## 2021-09-09 ENCOUNTER — HOSPITAL ENCOUNTER (OUTPATIENT)
Dept: PHYSICAL THERAPY | Facility: HOSPITAL | Age: 66
Setting detail: THERAPIES SERIES
Discharge: HOME | End: 2021-09-09

## 2021-09-09 DIAGNOSIS — M17.11 PRIMARY OSTEOARTHRITIS OF RIGHT KNEE: Primary | ICD-10-CM

## 2021-09-09 DIAGNOSIS — M71.21 SYNOVIAL CYST OF RIGHT POPLITEAL SPACE: ICD-10-CM

## 2021-09-09 PROCEDURE — 97110 THERAPEUTIC EXERCISES: CPT

## 2021-09-09 PROCEDURE — G0283 ELEC STIM OTHER THAN WOUND: HCPCS

## 2021-09-09 NOTE — THERAPY TREATMENT NOTE
Outpatient Physical Therapy Ortho Treatment Note  [facility]     Patient Name: Ann Marie Malik  : 1955  MRN: [id number]  Today's Date: 2021      Visit Date: 2021     Subjective Improvement: 25%  Attendance:  3/3 (medicare)  Next MD Visit : 10/08/2021  Recert Date:  2021        Therapy Diagnosis:  S/P R TKA 2021    Visit Dx:    ICD-10-CM ICD-9-CM   1. Primary osteoarthritis of right knee  M17.11 715.16   2. Synovial cyst of right popliteal space  M71.21 727.51       Patient Active Problem List   Diagnosis   • Chronic pain of right knee   • Primary osteoarthritis of right knee   • Internal derangement of right knee   • Degenerative tear of lateral meniscus of right knee   • Chondromalacia of right knee   • Synovial cyst of right popliteal space   • Cerebrovascular accident (CVA) (CMS/HCC)   • Complex tear of lateral meniscus of right knee as current injury   • Bilateral pulmonary embolism (CMS/HCC)   • Hypoxia        Past Medical History:   Diagnosis Date   • GERD (gastroesophageal reflux disease)    • Hyperlipidemia    • Hypertension    • Osteoarthritis    • Pulmonary emboli (CMS/HCC)    • Stroke (CMS/HCC)         Past Surgical History:   Procedure Laterality Date   • TOTAL KNEE ARTHROPLASTY Right 2021       PT Ortho     Row Name 21       Precautions and Contraindications    Precautions/Limitations  no known precautions/limitations  -    Precautions  Monitor BP PRN- hx of CVA without residual effects   -       Posture/Observations    Posture/Observations Comments  sterri strips intact; incision well healed; gait with SPC, slow but non antalgic  -       General ROM    GENERAL ROM COMMENTS  AROM 0-115°  -      User Key  (r) = Recorded By, (t) = Taken By, (c) = Cosigned By    Initials Name Provider Type    Stephanie Hameed PTA Physical Therapy Assistant                      PT Assessment/Plan     Row Name 21          PT Assessment     Assessment Comments  Continues to respond well to current treatment; No increased pain or drop in 02 noted; Flexion ROM and gait continue to improve each visit; Has met all STGs at this time;   -        PT Plan    PT Frequency  2x/week  -     Predicted Duration of Therapy Intervention (PT)  6-8 weeks  -     PT Plan Comments  Continue to monitor vitals, warmth and redness at the lateral jt line; progress CKC added TKE and lateral step ups next; progress to gait without cane;   -       User Key  (r) = Recorded By, (t) = Taken By, (c) = Cosigned By    Initials Name Provider Type    Stephanie Hameed PTA Physical Therapy Assistant          Modalities     Row Name 09/09/21 0800             Ice    Ice Applied  Yes  -      Location  R knee with [facility]   -      Ice S/P Rx  Yes  -         ELECTRICAL STIMULATION    Attended/Unattended  Unattended  -      Stimulation Type  IFC  -      Location/Electrode Placement/Other  R knee with ice   -KH      PT E-Stim Unattended Minutes  15  -KH        User Key  (r) = Recorded By, (t) = Taken By, (c) = Cosigned By    Initials Name Provider Type    Stephanie Hameed PTA Physical Therapy Assistant        OP Exercises     Row Name 09/09/21 0800             Precautions    Existing Precautions/Restrictions  other (see comments) recent bilat PE  -         Subjective Comments    Subjective Comments  patient reports that the MD was well pleased with her progress; Doesn't want her to push it because of the blood thinner but otherwsie progress as tolerated; Reports that she slept without her 02 and is trying to go without during the day; Took pain pill this morning but currently having no pain   -         Subjective Pain    Able to rate subjective pain?  yes  -KH      Pre-Treatment Pain Level  0  -KH         Total Minutes    25040 - PT Therapeutic Exercise Minutes  45  -KH         Exercise 1    Exercise Name 1  pro ll LE ROM O2 prior to treatment 98% (no O2)  -KH      Time 1  10 mins  " -KH      Additional Comments  level 3; seat 10  -KH         Exercise 2    Exercise Name 2  incline  stretch  -KH      Cueing 2  Verbal  -KH      Sets 2  3  -KH      Time 2  30\" holds  -KH         Exercise 3    Exercise Name 3  standing hamstring stretch  -KH      Cueing 3  Verbal  -KH      Sets 3  3  -KH      Time 3  30\" holds  -KH         Exercise 4    Exercise Name 4  CR/TR  -KH      Cueing 4  Verbal  -KH      Sets 4  2  -KH      Reps 4  10  -KH         Exercise 5    Exercise Name 5  step ups fwd  -KH      Cueing 5  Verbal  -KH      Sets 5  2  -KH      Reps 5  10  -KH      Additional Comments  4\" step  02 95% after step ups  -KH         Exercise 6    Exercise Name 6  LAQ w/ aw  -KH      Sets 6  3  -KH      Reps 6  10  -KH      Additional Comments  1# aw  -KH         Exercise 7    Exercise Name 7  seated hip flexion w/ aw  -KH      Sets 7  2  -KH      Reps 7  10  -KH      Additional Comments  1# aw  -KH         Exercise 8    Exercise Name 8  quad sets  -KH      Cueing 8  Verbal  -KH      Sets 8  2  -KH      Reps 8  10  -KH      Time 8  5\" holds  -KH         Exercise 9    Exercise Name 9  heel slides w/ strap  -KH      Sets 9  2  -KH      Reps 9  10  -KH        User Key  (r) = Recorded By, (t) = Taken By, (c) = Cosigned By    Initials Name Provider Type    Stephanie Hameed PTA Physical Therapy Assistant                       PT OP Goals     Row Name 09/09/21 0800          PT Short Term Goals    STG Date to Achieve  09/15/21  -KH     STG 1  AROM knee ext 0 degrees   -KH     STG 1 Progress  Met  -KH     STG 2  AROM knee flexion 110 degrees   -KH     STG 2 Progress  Met  -KH     STG 3  Ambulate with SC community distances   -KH     STG 3 Progress  Met  -KH     STG 4  Independent SLR without lag   -KH     STG 4 Progress  Met  -KH        Long Term Goals    LTG Date to Achieve  09/30/21  -KH     LTG 1  AROM R knee 0-120 or better   -KH     LTG 1 Progress  Ongoing  -KH     LTG 2  Ambulate community distances independent of " "AD safely '  -     LTG 2 Progress  Ongoing  -     LTG 3  R Knee MMT 5/5   -     LTG 3 Progress  Ongoing  -     LTG 4  SLS RLE 3\" or better on level ground   -     LTG 4 Progress  Ongoing  -     LTG 5  Improve LEFS score to 50 or higher  -     LTG 5 Progress  New  -        Time Calculation    PT Goal Re-Cert Due Date  09/23/21  -       User Key  (r) = Recorded By, (t) = Taken By, (c) = Cosigned By    Initials Name Provider Type    Stephanie Hameed PTA Physical Therapy Assistant                         Time Calculation:   Start Time: 0845  Stop Time: 0945  Time Calculation (min): 60 min  Timed Charges  67330 - PT Therapeutic Exercise Minutes: 45  Untimed Charges  PT E-Stim Unattended Minutes: 15  Total Minutes  Timed Charges Total Minutes: 45  Untimed Charges Total Minutes: 15   Total Minutes: 60  Therapy Charges for Today     Code Description Service Date Service Provider Modifiers Qty    55955302445 HC PT THER PROC EA 15 MIN 9/9/2021 Stephanie Amanda PTA GP 3    83902164675 HC PT ELECTRICAL STIM UNATTENDED 9/9/2021 Stephanie Amanda PTA  1    65760870745 HC PT THER SUPP EA 15 MIN 9/9/2021 Stephanie Amanda PTA GP 1                    Stephanie Amanda PTA  9/9/2021     "

## 2021-09-10 ENCOUNTER — APPOINTMENT (OUTPATIENT)
Dept: PHYSICAL THERAPY | Facility: HOSPITAL | Age: 66
End: 2021-09-10

## 2021-09-13 ENCOUNTER — OFFICE VISIT (OUTPATIENT)
Dept: CARDIAC SURGERY | Facility: CLINIC | Age: 66
End: 2021-09-13

## 2021-09-13 VITALS
BODY MASS INDEX: 28.03 KG/M2 | HEART RATE: 70 BPM | SYSTOLIC BLOOD PRESSURE: 128 MMHG | OXYGEN SATURATION: 97 % | DIASTOLIC BLOOD PRESSURE: 84 MMHG | HEIGHT: 67 IN | WEIGHT: 178.6 LBS | TEMPERATURE: 97.7 F

## 2021-09-13 DIAGNOSIS — I26.99 BILATERAL PULMONARY EMBOLISM (HCC): Primary | ICD-10-CM

## 2021-09-13 DIAGNOSIS — Z71.89 ENCOUNTER FOR ANTICOAGULATION DISCUSSION AND COUNSELING: ICD-10-CM

## 2021-09-13 PROCEDURE — 99214 OFFICE O/P EST MOD 30 MIN: CPT | Performed by: NURSE PRACTITIONER

## 2021-09-13 NOTE — PATIENT INSTRUCTIONS
Adverse bleeding events that require evaluation includes blood in the urine, stool, gums, and nose.  If you are to experience these symptoms you should present to the heart and vascular center for evaluation.  Avoid NSAID's such as ibuprofen and naproxen for pain relief as these medications increase your risk of gastrointestinal bleeding.  Over the counter supplements such as garlic, gingko biloba, and other herbs may increase bleeding risks.     Return 3 months recheck echocardiogram.  CTA chest in 3 months followed by hypercoagulable.

## 2021-09-14 ENCOUNTER — HOSPITAL ENCOUNTER (OUTPATIENT)
Dept: PHYSICAL THERAPY | Facility: HOSPITAL | Age: 66
Setting detail: THERAPIES SERIES
Discharge: HOME | End: 2021-09-14

## 2021-09-14 DIAGNOSIS — M17.11 PRIMARY OSTEOARTHRITIS OF RIGHT KNEE: Primary | ICD-10-CM

## 2021-09-14 DIAGNOSIS — M71.21 SYNOVIAL CYST OF RIGHT POPLITEAL SPACE: ICD-10-CM

## 2021-09-14 PROBLEM — Z71.89 ENCOUNTER FOR ANTICOAGULATION DISCUSSION AND COUNSELING: Status: ACTIVE | Noted: 2021-09-14

## 2021-09-14 PROCEDURE — 97116 GAIT TRAINING THERAPY: CPT

## 2021-09-14 PROCEDURE — 97110 THERAPEUTIC EXERCISES: CPT

## 2021-09-14 PROCEDURE — G0283 ELEC STIM OTHER THAN WOUND: HCPCS

## 2021-09-14 NOTE — THERAPY TREATMENT NOTE
Outpatient Physical Therapy Ortho Treatment Note  [facility]     Patient Name: Ann Marie Malik  : 1955  MRN: [id number]  Today's Date: 2021      Visit Date: 2021     Subjective Improvement: 25%  Attendance:   (medicare)  Next MD Visit : 10/08/2021  Recert Date:  2021        Therapy Diagnosis:  S/P R TKA 2021    Visit Dx:    ICD-10-CM ICD-9-CM   1. Primary osteoarthritis of right knee  M17.11 715.16   2. Synovial cyst of right popliteal space  M71.21 727.51       Patient Active Problem List   Diagnosis   • Chronic pain of right knee   • Primary osteoarthritis of right knee   • Internal derangement of right knee   • Degenerative tear of lateral meniscus of right knee   • Chondromalacia of right knee   • Synovial cyst of right popliteal space   • Cerebrovascular accident (CVA) (CMS/HCC)   • Complex tear of lateral meniscus of right knee as current injury   • Bilateral pulmonary embolism (CMS/HCC)   • Hypoxia        Past Medical History:   Diagnosis Date   • GERD (gastroesophageal reflux disease)    • Hyperlipidemia    • Hypertension    • Osteoarthritis    • Pulmonary emboli (CMS/HCC)    • Stroke (CMS/HCC)         Past Surgical History:   Procedure Laterality Date   • TOTAL KNEE ARTHROPLASTY Right 2021       PT Ortho     Row Name 21 0800       Subjective Comments    Subjective Comments  Patient reports that she saw the nurse prac yesterday for her lungs and PEs, states that her 02 was 97% on room air and is continuing to do well; Reports that the MD was pleased with progress and will have an Echo of heart in Dec and then 5 months for scan of the lungs; Knee is doing well; Denies any pain this morning but was unable to do HEP yesterday secondary to many appts; Patient reports also that she is doing well without using cane inside the house;   -KH       Precautions and Contraindications    Precautions/Limitations  no known precautions/limitations  -KH     Precautions  Monitor BP PRN- hx of CVA without residual effects   -       Posture/Observations    Posture/Observations Comments  sterri strips intact; incision well healed; gait with SPC, slow but non antalgic  -       General ROM    GENERAL ROM COMMENTS  AROM 0-117°  -      User Key  (r) = Recorded By, (t) = Taken By, (c) = Cosigned By    Initials Name Provider Type    Stephanie Hameed PTA Physical Therapy Assistant                      PT Assessment/Plan     Row Name 09/14/21 0800          PT Plan    PT Frequency  2x/week  -LIAN     Predicted Duration of Therapy Intervention (PT)  6-8 weeks  -     PT Plan Comments  Ramp 4 way  next for endurance and strength   -       User Key  (r) = Recorded By, (t) = Taken By, (c) = Cosigned By    Initials Name Provider Type    Stephanie Hameed PTA Physical Therapy Assistant          Modalities     Row Name 09/14/21 0800             Ice    Ice Applied  Yes  -KH      Location  R knee with IFC   -      Ice S/P Rx  Yes  -         ELECTRICAL STIMULATION    Attended/Unattended  Unattended  -      Stimulation Type  IFC  -      Location/Electrode Placement/Other  R knee with ice   -      PT E-Stim Unattended Minutes  15  -KH        User Key  (r) = Recorded By, (t) = Taken By, (c) = Cosigned By    Initials Name Provider Type    Stephanie Hameed PTA Physical Therapy Assistant        OP Exercises     Row Name 09/14/21 0800             Precautions    Existing Precautions/Restrictions  other (see comments) recent bilat PE  -         Subjective Comments    Subjective Comments  Patient reports that she saw the nurse prac yesterday for her lungs and PEs, states that her 02 was 97% on room air and is continuing to do well; Reports that the MD was pleased with progress and will have an Echo of heart in Dec and then 5 months for scan of the lungs; Knee is doing well; Denies any pain this morning but was unable to do HEP yesterday secondary to many appts; Patient reports also that  "she is doing well without using cane inside the house;   -KH         Subjective Pain    Able to rate subjective pain?  yes  -KH      Pre-Treatment Pain Level  0  -KH         Exercise 1    Exercise Name 1  pro ll LE ROM/Strength   -KH      Time 1  10 mins  -KH      Additional Comments  level 2.5; seat 10  -KH         Exercise 2    Exercise Name 2  incline  stretch  -KH      Cueing 2  Verbal  -KH      Sets 2  3  -KH      Time 2  30\" holds  -KH         Exercise 3    Exercise Name 3  standing hamstring stretch  -KH      Cueing 3  Verbal  -KH      Sets 3  3  -KH      Time 3  30\" holds  -KH         Exercise 4    Exercise Name 4  step ups fwd & lat  -KH      Cueing 4  Verbal  -KH      Sets 4  2  -KH      Reps 4  10 each   -KH      Additional Comments  4\" step   -KH         Exercise 5    Exercise Name 5  sit to stands  -KH      Sets 5  2  -KH      Reps 5  5  -KH      Additional Comments  no UE assist airex in chair to raise 2 inches  -KH         Exercise 6    Exercise Name 6  gait around clinic w/o clinic  -KH      Additional Comments  270ft x1 w/ VC fo rheel toe patteren  -KH         Exercise 7    Exercise Name 7  quad sets with towel roll  -KH      Sets 7  2  -KH      Reps 7  10  -KH      Time 7  5\" holds  -KH         Exercise 8    Exercise Name 8  SLR  -KH      Sets 8  2  -KH      Reps 8  10  -KH      Additional Comments  3° quad lag  -KH         Exercise 9    Exercise Name 9  measured ROM of R knee  -KH        User Key  (r) = Recorded By, (t) = Taken By, (c) = Cosigned By    Initials Name Provider Type    Stephanie Hameed PTA Physical Therapy Assistant                       PT OP Goals     Row Name 09/14/21 0800          PT Short Term Goals    STG Date to Achieve  09/15/21  -KH     STG 1  AROM knee ext 0 degrees   -KH     STG 1 Progress  Met  -KH     STG 2  AROM knee flexion 110 degrees   -KH     STG 2 Progress  Met  -KH     STG 3  Ambulate with SC community distances   -KH     STG 3 Progress  Met  -KH     STG 4  " "Independent SLR without lag   -     STG 4 Progress  Met  -        Long Term Goals    LTG Date to Achieve  09/30/21  -     LTG 1  AROM R knee 0-120 or better   -     LTG 1 Progress  Ongoing  -     LTG 2  Ambulate community distances independent of AD safely '  -     LTG 2 Progress  Ongoing  -     LTG 3  R Knee MMT 5/5   -     LTG 3 Progress  Ongoing  -     LTG 4  SLS RLE 3\" or better on level ground   -     LTG 4 Progress  Ongoing  -     LTG 5  Improve LEFS score to 50 or higher  -     LTG 5 Progress  New  -        Time Calculation    PT Goal Re-Cert Due Date  09/23/21  -       User Key  (r) = Recorded By, (t) = Taken By, (c) = Cosigned By    Initials Name Provider Type    Stephanie Hameed PTA Physical Therapy Assistant                         Time Calculation:   Start Time: 0800  Stop Time: 0900  Time Calculation (min): 60 min  Untimed Charges  PT E-Stim Unattended Minutes: 15  Total Minutes  Untimed Charges Total Minutes: 15   Total Minutes: 15  Therapy Charges for Today     Code Description Service Date Service Provider Modifiers Qty    19392756099 HC PT ELECTRICAL STIM UNATTENDED 9/14/2021 Stephanie Amanda, PTA  1    42467686085 HC PT THER SUPP EA 15 MIN 9/14/2021 Stephanie Amanda, [place] GP 1    08993175698 HC PT THER PROC EA 15 MIN 9/14/2021 Stephanie Amanda, [place] GP 2    [id number] HC GAIT TRAINING EA 15 MIN 9/14/2021 Stephanie Amanda, [place] GP 1                    Stephanie Amanda PTA  9/14/2021     "

## 2021-09-14 NOTE — PROGRESS NOTES
CVTS Office Progress Note     2021    Ann Marie Malik  1955    Chief Complaint:    Chief Complaint   Patient presents with   • 2-3 week f/u     anticoagulation       HPI:      PCP:  Verito He MD     65 y.o. female with HTN(stable, increased risk stroke, rupture) and Hyperlipidemia(stable, increased risk cardiovascular events)  GERD, OA, prior CVA no residual. former smoker and quit 200.  Total R knee replacement earlier this week, developed some shortness of air x2 days at PT where O2 saturation was obtained in the low 80's.  Reported to emergency department and was found to have small bilateral PE's.   CTVS consulted for treatment recs.  Denies FH of VTE, 1st event provoked with recent surgery.  Returns today in follow up.  Now on room air  No other associated symptoms or modifying factors.     2021 CTA Chest: Small bilateral PE  2021 Bilateral venous: Negative for DVT   TTE: mild reduction in RV function, RVSP 46, EF 60%, mild TR      The following portions of the patient's history were reviewed and updated as appropriate: allergies, current medications, past family history, past medical history, past social history, past surgical history and problem list.  Recent images independently reviewed.  Available laboratory values reviewed.    PMH:  Past Medical History:   Diagnosis Date   • GERD (gastroesophageal reflux disease)    • Hyperlipidemia    • Hypertension    • Osteoarthritis    • Pulmonary emboli (CMS/HCC)    • Stroke (CMS/HCC)      Past Surgical History:   Procedure Laterality Date   • TOTAL KNEE ARTHROPLASTY Right 2021     Family History   Problem Relation Age of Onset   • Hypertension Mother    • Hyperlipidemia Father    • Cancer Sister      Social History     Tobacco Use   • Smoking status: Former Smoker     Packs/day: 1.00     Years: 25.00     Pack years: 25.00     Types: Cigarettes     Quit date:      Years since quittin.7   • Smokeless tobacco: Never  Used   Vaping Use   • Vaping Use: Never used   Substance Use Topics   • Alcohol use: No   • Drug use: Never       ALLERGIES:  Allergies   Allergen Reactions   • Adhesive Tape Other (See Comments)     Pulls skin off   • Wound Dressing Adhesive Rash         MEDICATIONS:    Current Outpatient Medications:   •  acetaminophen (TYLENOL) 650 MG 8 hr tablet, Take 650 mg by mouth Every 8 (Eight) Hours As Needed for Mild Pain ., Disp: , Rfl:   •  albuterol sulfate  (90 Base) MCG/ACT inhaler, Inhale 2 puffs Every 6 (Six) Hours As Needed for Wheezing., Disp: 18 g, Rfl: 0  •  apixaban (ELIQUIS) 5 MG tablet tablet, Take 1 tablet by mouth Every 12 (Twelve) Hours. Indications: DVT/PE (active thrombosis), Disp: 60 tablet, Rfl: 2  •  atorvastatin (LIPITOR) 40 MG tablet, Take 40 mg by mouth Every Night., Disp: , Rfl:   •  hydroCHLOROthiazide (HYDRODIURIL) 25 MG tablet, Take 25 mg by mouth Daily., Disp: , Rfl:   •  omeprazole (priLOSEC) 20 MG capsule, Take 20 mg by mouth Daily., Disp: , Rfl:   •  oxyCODONE-acetaminophen (PERCOCET) 7.5-325 MG per tablet, Take 1 tablet by mouth Every 6 (Six) Hours As Needed for Moderate Pain., Disp: 30 tablet, Rfl: 0  •  valsartan (Diovan) 160 MG tablet, Take 1 tablet by mouth Daily., Disp: 90 tablet, Rfl: 3  •  VITAMIN E PO, Take 180 mg by mouth 2 (Two) Times a Day., Disp: , Rfl:   •  apixaban (ELIQUIS) 5 MG tablet tablet, Take 1 tablet by mouth Every 12 (Twelve) Hours. Lot: [id number], Exp: 03/23, Disp: 84 tablet, Rfl: 0      Review of Systems   Constitutional: Negative for chills, decreased appetite, fever and weight loss.   HENT: Negative for congestion, nosebleeds and sore throat.    Eyes: Negative for blurred vision, visual disturbance and visual halos.   Cardiovascular: Positive for dyspnea on exertion (improving). Negative for chest pain and leg swelling.   Respiratory: Negative for cough, shortness of breath, sputum production and wheezing.    Endocrine: Negative for cold intolerance and  "polyuria.   Hematologic/Lymphatic: Negative for bleeding problem. Does not bruise/bleed easily.   Skin: Negative for flushing, nail changes and unusual hair distribution.   Musculoskeletal: Positive for arthritis and joint pain. Negative for back pain.   Gastrointestinal: Negative for bloating, abdominal pain, hematemesis, melena, nausea and vomiting.   Genitourinary: Negative for flank pain and hematuria.   Neurological: Negative for brief paralysis, difficulty with concentration, focal weakness, light-headedness, loss of balance, numbness, paresthesias and weakness.   Psychiatric/Behavioral: Negative for altered mental status, depression, substance abuse and suicidal ideas.   Allergic/Immunologic: Negative for hives and persistent infections.         Vitals:    09/13/21 1010   BP: 128/84   BP Location: Left arm   Patient Position: Sitting   Cuff Size: Adult   Pulse: 70   Temp: 97.7 °F (36.5 °C)   SpO2: 97%   Weight: 81 kg (178 lb 9.6 oz)   Height: 170.2 cm (67\")     Physical Exam  Vitals and nursing note reviewed.   Constitutional:       Appearance: Normal appearance.   HENT:      Head: Normocephalic.      Nose: Nose normal.      Mouth/Throat:      Mouth: Mucous membranes are moist.   Eyes:      Pupils: Pupils are equal, round, and reactive to light.   Cardiovascular:      Rate and Rhythm: Normal rate.      Pulses: Normal pulses.   Pulmonary:      Effort: Pulmonary effort is normal.      Comments: Now on room air  Abdominal:      General: Abdomen is flat.      Palpations: Abdomen is soft.   Musculoskeletal:         General: Normal range of motion.      Cervical back: Normal range of motion.   Skin:     General: Skin is warm and dry.      Capillary Refill: Capillary refill takes 2 to 3 seconds.   Neurological:      General: No focal deficit present.      Mental Status: She is alert and oriented to person, place, and time.   Psychiatric:         Mood and Affect: Mood normal.         Assessment & Plan     Independent " Review of Studies    1. Bilateral pulmonary embolism (CMS/HCC)  1st provoked event.  now on room air 3 weeks therapy.     Factor V, Factor II, homocysteine negative.  Completion of anti-phospholipid, lupus anticoagulant, antithrombin III, and C/S proteins once anticoagulant therapy has been completed.      Anticoagulation x6 months     Extensive risks and benefits discussion regarding selection of anticoagulant.  Pro's/con's of DOAC vs. Vitamin K antagonist discussed. Patient understands and wishes to proceed with current plan.  Patient instructed to monitor for signs of adverse bleeding event not limited to but including hematemesis, hematuria, hematochezia.  In the event of trauma or fall patient is advised to undergo evaluation at nearest emergency department.       Repeat echo in 3 months reassess RV    - Adult Transthoracic Echo Complete w/ Color, Spectral and Contrast if necessary per protocol; Future    2. Encounter for anticoagulation discussion and counseling  Adverse bleeding events that require evaluation includes blood in the urine, stool, gums, and nose.  If you are to experience these symptoms you should present to the heart and vascular center for evaluation.  Avoid NSAID's such as ibuprofen and naproxen for pain relief as these medications increase your risk of gastrointestinal bleeding.  Over the counter supplements such as garlic, gingko biloba, and other herbs may increase bleeding risks.     Using samples, copay 400$.  If requires long term anticoagulation will have to review insurance and options.     Detailed discussion regarding risks, benefits, and treatment plan. Images independently reviewed. Patient understands, agrees, and wishes to proceed with plan.       This document has been electronically signed by Eladio Salazar [email or web address]-BC @  On September 14, 2021 10:48 CDT      EMR Dragon/Transcription disclaimer:   Part of this note may be an electronic transcription/translation of spoken language to  printed text using the Dragon Dictation System.

## 2021-09-16 ENCOUNTER — HOSPITAL ENCOUNTER (OUTPATIENT)
Dept: PHYSICAL THERAPY | Facility: HOSPITAL | Age: 66
Setting detail: THERAPIES SERIES
Discharge: HOME | End: 2021-09-16

## 2021-09-16 DIAGNOSIS — M71.21 SYNOVIAL CYST OF RIGHT POPLITEAL SPACE: ICD-10-CM

## 2021-09-16 DIAGNOSIS — M17.11 PRIMARY OSTEOARTHRITIS OF RIGHT KNEE: Primary | ICD-10-CM

## 2021-09-16 PROCEDURE — G0283 ELEC STIM OTHER THAN WOUND: HCPCS

## 2021-09-16 PROCEDURE — 97110 THERAPEUTIC EXERCISES: CPT

## 2021-09-16 NOTE — THERAPY TREATMENT NOTE
Outpatient Physical Therapy Ortho Treatment Note  [facility]     Patient Name: Ann Marie Malik  : 1955  MRN: [id number]  Today's Date: 2021      Visit Date: 2021     Subjective Improvement: 25%  Attendance:  / (medicare;20/yr secondary)  Next MD Visit : 10/08/2021  Recert Date:  2021        Therapy Diagnosis:  S/P R TKA 2021    Visit Dx:    ICD-10-CM ICD-9-CM   1. Primary osteoarthritis of right knee  M17.11 715.16   2. Synovial cyst of right popliteal space  M71.21 727.51       Patient Active Problem List   Diagnosis   • Chronic pain of right knee   • Primary osteoarthritis of right knee   • Internal derangement of right knee   • Degenerative tear of lateral meniscus of right knee   • Chondromalacia of right knee   • Synovial cyst of right popliteal space   • Cerebrovascular accident (CVA) (CMS/HCC)   • Complex tear of lateral meniscus of right knee as current injury   • Bilateral pulmonary embolism (CMS/HCC)   • Hypoxia   • Encounter for anticoagulation discussion and counseling        Past Medical History:   Diagnosis Date   • GERD (gastroesophageal reflux disease)    • Hyperlipidemia    • Hypertension    • Osteoarthritis    • Pulmonary emboli (CMS/HCC)    • Stroke (CMS/HCC)         Past Surgical History:   Procedure Laterality Date   • TOTAL KNEE ARTHROPLASTY Right 2021       PT Ortho     Row Name 21       Precautions and Contraindications    Precautions/Limitations  no known precautions/limitations  -    Precautions  Monitor BP PRN- hx of CVA without residual effects   -       Posture/Observations    Posture/Observations Comments  good stride with gait using [facility]  -       General ROM    GENERAL ROM COMMENTS  AROM 0-120°  -      User Key  (r) = Recorded By, (t) = Taken By, (c) = Cosigned By    Initials Name Provider Type    Stephanie Hameed PTA Physical Therapy Assistant                      PT Assessment/Plan     Row Name 21 08        "   PT Assessment    Assessment Comments  no fatigue noted with exercises this date; continues to improve with ROM and strength; Progressing with good quad control; Pt to obtain vitamin E today and begin scar massage for home; Met LTG ROM goal;   -        PT Plan    PT Frequency  2x/week  -KH     Predicted Duration of Therapy Intervention (PT)  6-8 weeks  -     PT Plan Comments  Progress to 6\" step next visit; progress gait and ROM as able; Balance as needed;   -       User Key  (r) = Recorded By, (t) = Taken By, (c) = Cosigned By    Initials Name Provider Type    Stephanie Hameed PTA Physical Therapy Assistant          Modalities     Row Name 09/16/21 0800             Subjective Pain    Post-Treatment Pain Level  1  -KH         Ice    Ice Applied  Yes  -      Location  R knee with [facility]   -      Ice S/P Rx  Yes  -         ELECTRICAL STIMULATION    Attended/Unattended  Unattended  -      Stimulation Type  IFC  -      Location/Electrode Placement/Other  R knee with ice   -      PT E-Stim Unattended Minutes  15  -KH        User Key  (r) = Recorded By, (t) = Taken By, (c) = Cosigned By    Initials Name Provider Type    Stephanie Hameed PTA Physical Therapy Assistant        OP Exercises     Row Name 09/16/21 0800             Precautions    Existing Precautions/Restrictions  other (see comments) recent bilat PE  -         Subjective Comments    Subjective Comments  Patient reports that she had increased soreness yesterday from treatment on Tuesday but much better today; Reports that her pain meds have not kicked in yet this morning; Only taking pain meds in the morning and before bed;   -         Subjective Pain    Able to rate subjective pain?  yes  -KH      Pre-Treatment Pain Level  2  -KH      Post-Treatment Pain Level  1  -KH         Total Minutes    73224 - PT Therapeutic Exercise Minutes  45  -KH         Exercise 1    Exercise Name 1  pro ll LE ROM/Strength   -      Time 1  10 mins  -KH      " "Additional Comments  level 3; seat 10  -KH         Exercise 2    Exercise Name 2  incline  stretch  -KH      Cueing 2  Verbal  -KH      Sets 2  3  -KH      Time 2  30\" holds  -KH         Exercise 3    Exercise Name 3  standing hamstring stretch  -KH      Cueing 3  Verbal  -KH      Sets 3  3  -KH      Time 3  30\" holds  -KH         Exercise 4    Exercise Name 4  step ups fwd & lat  -KH      Cueing 4  Verbal  -KH      Sets 4  3  -KH      Reps 4  10 each   -KH      Additional Comments  4\" step   -KH         Exercise 5    Exercise Name 5  Ramp 2 way next  -KH      Reps 5  5 trips each   -KH         Exercise 6    Exercise Name 6  LAQ w/ aw  -KH      Sets 6  3  -KH      Reps 6  10  -KH      Additional Comments  2# aw  -KH         Exercise 7    Exercise Name 7  Measured ROM & educated on scar massage  -KH      Time 7  5 mins  -KH        User Key  (r) = Recorded By, (t) = Taken By, (c) = Cosigned By    Initials Name Provider Type    Stephanie Hameed PTA Physical Therapy Assistant                       PT OP Goals     Row Name 09/16/21 0800          PT Short Term Goals    STG Date to Achieve  09/15/21  -KH     STG 1  AROM knee ext 0 degrees   -KH     STG 1 Progress  Met  -KH     STG 2  AROM knee flexion 110 degrees   -KH     STG 2 Progress  Met  -KH     STG 3  Ambulate with SC community distances   -KH     STG 3 Progress  Met  -KH     STG 4  Independent SLR without lag   -KH     STG 4 Progress  Met  -KH        Long Term Goals    LTG Date to Achieve  09/30/21  -KH     LTG 1  AROM R knee 0-120 or better   -     LTG 1 Progress  (S) Met  -KH     LTG 2  Ambulate community distances independent of AD safely '  -KH     LTG 2 Progress  Ongoing  -KH     LTG 3  R Knee MMT 5/5   -KH     LTG 3 Progress  Ongoing  -KH     LTG 4  SLS RLE 3\" or better on level ground   -     LTG 4 Progress  Ongoing  -KH     LTG 5  Improve LEFS score to 50 or higher  -     LTG 5 Progress  New  -KH        Time Calculation    PT Goal Re-Cert Due Date  " 09/23/21  -LIAN       User Key  (r) = Recorded By, (t) = Taken By, (c) = Cosigned By    Initials Name Provider Type    Stephanie Hameed PTA Physical Therapy Assistant                         Time Calculation:   Start Time: 0757  Stop Time: 0857  Time Calculation (min): 60 min  Timed Charges  55422 - PT Therapeutic Exercise Minutes: 45  Untimed Charges  PT E-Stim Unattended Minutes: 15  Total Minutes  Timed Charges Total Minutes: 45  Untimed Charges Total Minutes: 15   Total Minutes: 60  Therapy Charges for Today     Code Description Service Date Service Provider Modifiers Qty    71412069311 HC PT THER SUPP EA 15 MIN 9/16/2021 Stephanie Amanda, PTA GP 1    77436589216 HC PT THER PROC EA 15 MIN 9/16/2021 Stephanie Amanda, [place] GP 3    48935171957 HC PT ELECTRICAL STIM UNATTENDED 9/16/2021 Stephanie Amanda, [place]  1                    Stephanie Amanda PTA  9/16/2021

## 2021-09-20 DIAGNOSIS — M71.21 SYNOVIAL CYST OF RIGHT POPLITEAL SPACE: ICD-10-CM

## 2021-09-20 DIAGNOSIS — Z78.9 IMPAIRED MOBILITY AND ACTIVITIES OF DAILY LIVING: ICD-10-CM

## 2021-09-20 DIAGNOSIS — Z74.09 IMPAIRED MOBILITY AND ACTIVITIES OF DAILY LIVING: ICD-10-CM

## 2021-09-20 DIAGNOSIS — M17.11 PRIMARY OSTEOARTHRITIS OF RIGHT KNEE: ICD-10-CM

## 2021-09-20 DIAGNOSIS — Z74.09 IMPAIRED FUNCTIONAL MOBILITY, BALANCE, GAIT, AND ENDURANCE: ICD-10-CM

## 2021-09-20 RX ORDER — OXYCODONE AND ACETAMINOPHEN 7.5; 325 MG/1; MG/1
1 TABLET ORAL EVERY 8 HOURS PRN
Qty: 30 TABLET | Refills: 0 | Status: SHIPPED | OUTPATIENT
Start: 2021-09-20 | End: 2021-12-03

## 2021-09-20 NOTE — TELEPHONE ENCOUNTER
DR KHAN.  PATIENT IS REQUESTING A REFILL OF OXYCODONE 7.5 MG AT [facility] [place].

## 2021-09-21 ENCOUNTER — HOSPITAL ENCOUNTER (OUTPATIENT)
Dept: PHYSICAL THERAPY | Facility: HOSPITAL | Age: 66
Setting detail: THERAPIES SERIES
Discharge: HOME | End: 2021-09-21

## 2021-09-21 DIAGNOSIS — M17.11 PRIMARY OSTEOARTHRITIS OF RIGHT KNEE: Primary | ICD-10-CM

## 2021-09-21 PROCEDURE — G0283 ELEC STIM OTHER THAN WOUND: HCPCS

## 2021-09-21 PROCEDURE — 97110 THERAPEUTIC EXERCISES: CPT

## 2021-09-21 NOTE — THERAPY TREATMENT NOTE
Outpatient Physical Therapy Ortho Treatment Note  [facility]     Patient Name: Ann Marie Malik  : 1955  MRN: [id number]  Today's Date: 2021      Visit Date: 2021    Visit Dx:    ICD-10-CM ICD-9-CM   1. Primary osteoarthritis of right knee  M17.11 715.16       Patient Active Problem List   Diagnosis   • Chronic pain of right knee   • Primary osteoarthritis of right knee   • Internal derangement of right knee   • Degenerative tear of lateral meniscus of right knee   • Chondromalacia of right knee   • Synovial cyst of right popliteal space   • Cerebrovascular accident (CVA) (CMS/HCC)   • Complex tear of lateral meniscus of right knee as current injury   • Bilateral pulmonary embolism (CMS/HCC)   • Hypoxia   • Encounter for anticoagulation discussion and counseling        Past Medical History:   Diagnosis Date   • GERD (gastroesophageal reflux disease)    • Hyperlipidemia    • Hypertension    • Osteoarthritis    • Pulmonary emboli (CMS/HCC)    • Stroke (CMS/HCC)         Past Surgical History:   Procedure Laterality Date   • TOTAL KNEE ARTHROPLASTY Right 2021                       PT Assessment/Plan     Row Name 21 0800          PT Assessment    Assessment Comments  Pt reports no pain at end of tx but did feel pain with some ex's which resolved upon completion of each ex.  -TW        PT Plan    PT Frequency  2x/week  -TW     Predicted Duration of Therapy Intervention (PT)  6-8 weeks  -TW     PT Plan Comments  Cont with current tx POC.  -TW       User Key  (r) = Recorded By, (t) = Taken By, (c) = Cosigned By    Initials Name Provider Type    TW Carlos Blum PTA Physical Therapy Assistant          Modalities     Row Name 21 0800             Ice    Ice Applied  Yes  -TW      Location  R knee with IFC   -TW      PT Ice Rx Minutes  15  -TW      Ice S/P Rx  Yes  -TW         ELECTRICAL STIMULATION    Attended/Unattended  Unattended  -TW      Stimulation Type  IFC  -TW    "   Max mAmp  8.4  -TW      Location/Electrode Placement/Other  R knee with ice   -TW      PT E-Stim Unattended Minutes  15  -TW        User Key  (r) = Recorded By, (t) = Taken By, (c) = Cosigned By    Initials Name Provider Type    TW Carlos Blum PTA Physical Therapy Assistant        OP Exercises     Row Name 09/21/21 0800             Subjective Comments    Subjective Comments  Pt verb no new c/o at this time.  -TW         Subjective Pain    Able to rate subjective pain?  yes  -TW      Pre-Treatment Pain Level  0  -TW      Post-Treatment Pain Level  0  -TW         Exercise 1    Exercise Name 1  pro ll LE ROM/Strength   -TW      Time 1  10 mins  -TW      Additional Comments  lvl 3, seat 9  -TW         Exercise 2    Exercise Name 2  incline  stretch  -TW      Sets 2  3  -TW      Time 2  30\" holds  -TW         Exercise 3    Exercise Name 3  standing hamstring stretch  -TW      Sets 3  3  -TW      Time 3  30\" holds  -TW         Exercise 4    Exercise Name 4  step ups fwd & lat  -TW      Sets 4  3  -TW      Additional Comments  6'' step  -TW         Exercise 5    Exercise Name 5  Ramp 2 way next  -TW      Reps 5  5 trips each   -TW         Exercise 6    Exercise Name 6  LAQ w/ aw  -TW      Sets 6  3  -TW      Reps 6  10  -TW         Exercise 7    Exercise Name 7  Seated hip flexion with AW  -TW      Sets 7  3  -TW      Reps 7  10  -TW      Additional Comments  2# AW  -TW         Exercise 8    Exercise Name 8  IFC with ice/elevation  -TW      Time 8  15'  -TW        User Key  (r) = Recorded By, (t) = Taken By, (c) = Cosigned By    Initials Name Provider Type    TW Carlos Blum PTA Physical Therapy Assistant                       PT OP Goals     Row Name 09/21/21 0800          PT Short Term Goals    STG Date to Achieve  09/15/21  -TW     STG 1  AROM knee ext 0 degrees   -TW     STG 1 Progress  (S) Met  -TW     STG 2  AROM knee flexion 110 degrees   -TW     STG 2 Progress  (S) Met  -TW     STG 3  Ambulate " "with SC community distances   -TW     STG 3 Progress  (S) Met  -TW     STG 4  Independent SLR without lag   -TW     STG 4 Progress  (S) Met  -TW        Long Term Goals    LTG Date to Achieve  09/30/21  -TW     LTG 1  AROM R knee 0-120 or better   -TW     LTG 1 Progress  (S) Met  -TW     LTG 2  Ambulate community distances independent of AD safely '  -TW     LTG 2 Progress  Ongoing  -TW     LTG 3  R Knee MMT 5/5   -TW     LTG 3 Progress  Ongoing  -TW     LTG 4  SLS RLE 3\" or better on level ground   -TW     LTG 4 Progress  Ongoing  -TW     LTG 5  Improve LEFS score to 50 or higher  -TW     LTG 5 Progress  New  -TW       User Key  (r) = Recorded By, (t) = Taken By, (c) = Cosigned By    Initials Name Provider Type    TW Carlos Blum PTA Physical Therapy Assistant                         Time Calculation:   Start Time: 0806  Stop Time: 0911  Time Calculation (min): 65 min  Total Timed Code Minutes- PT: 65 minute(s)  Untimed Charges  PT E-Stim Unattended Minutes: 15  PT Ice Rx Minutes: 15  Total Minutes  Untimed Charges Total Minutes: 30   Total Minutes: 30  Therapy Charges for Today     Code Description Service Date Service Provider Modifiers Qty    22787656231 HC PT THER PROC EA 15 MIN 9/21/2021 Carlos Blum PTA GP 3    11840380980 HC PT ELECTRICAL STIM UNATTENDED 9/21/2021 Carlos Blum PTA  1    63331101535 HC PT THER SUPP EA 15 MIN 9/21/2021 Carlos Blum PTA GP 1                    Carlos Blum PTA  9/21/2021     "

## 2021-09-23 ENCOUNTER — HOSPITAL ENCOUNTER (OUTPATIENT)
Dept: PHYSICAL THERAPY | Facility: HOSPITAL | Age: 66
Setting detail: THERAPIES SERIES
Discharge: HOME | End: 2021-09-23

## 2021-09-23 DIAGNOSIS — M71.21 SYNOVIAL CYST OF RIGHT POPLITEAL SPACE: ICD-10-CM

## 2021-09-23 DIAGNOSIS — M17.11 PRIMARY OSTEOARTHRITIS OF RIGHT KNEE: Primary | ICD-10-CM

## 2021-09-23 PROCEDURE — 97110 THERAPEUTIC EXERCISES: CPT

## 2021-09-23 NOTE — THERAPY DISCHARGE NOTE
Outpatient Physical Therapy Ortho Treatment Note/Discharge Summary  [facility]     Patient Name: Ann Marie Malik  : 1955  MRN: [id number]  Today's Date: 2021      Visit Date: 2021     Subjective Improvement: %  Attendance:   (medicare;20/yr secondary)  Next MD Visit : 10/08/2021  Recert Date:  2021        Therapy Diagnosis:  S/P R TKA 2021    Visit Dx:    ICD-10-CM ICD-9-CM   1. Primary osteoarthritis of right knee  M17.11 715.16   2. Synovial cyst of right popliteal space  M71.21 727.51       Patient Active Problem List   Diagnosis   • Chronic pain of right knee   • Primary osteoarthritis of right knee   • Internal derangement of right knee   • Degenerative tear of lateral meniscus of right knee   • Chondromalacia of right knee   • Synovial cyst of right popliteal space   • Cerebrovascular accident (CVA) (CMS/HCC)   • Complex tear of lateral meniscus of right knee as current injury   • Bilateral pulmonary embolism (CMS/HCC)   • Hypoxia   • Encounter for anticoagulation discussion and counseling        Past Medical History:   Diagnosis Date   • GERD (gastroesophageal reflux disease)    • Hyperlipidemia    • Hypertension    • Osteoarthritis    • Pulmonary emboli (CMS/HCC)    • Stroke (CMS/HCC)         Past Surgical History:   Procedure Laterality Date   • TOTAL KNEE ARTHROPLASTY Right 2021       PT Ortho     Row Name 21 0700       Precautions and Contraindications    Precautions/Limitations  no known precautions/limitations  -    Precautions  Monitor BP PRN- hx of CVA without residual effects   -       Posture/Observations    Posture/Observations Comments  good stride, holding cane in hand  -       General ROM    GENERAL ROM COMMENTS  AROM 0-123°  -       MMT (Manual Muscle Testing)    General MMT Comments  5/5 all planes of R LE  -      User Key  (r) = Recorded By, (t) = Taken By, (c) = Cosigned By    Initials Name Provider Type    LIAN Amanda  "MO Brown Physical Therapy Assistant                      PT Assessment/Plan     Row Name 09/23/21 0700          PT Assessment    Assessment Comments  met all goals at this time; all patients questions answered and feels she can be finished with PT at this time; added bridges to HEP;   -        PT Plan    PT Frequency  2x/week  -LIAN     Predicted Duration of Therapy Intervention (PT)  6-8 weeks  -     PT Plan Comments  D/C to independent program and HEP;   -       User Key  (r) = Recorded By, (t) = Taken By, (c) = Cosigned By    Initials Name Provider Type    Stephanie Hameed PTA Physical Therapy Assistant          Modalities     Row Name 09/23/21 0700             Ice    Patient reports will apply ice at home to involved area  Yes  -        User Key  (r) = Recorded By, (t) = Taken By, (c) = Cosigned By    Initials Name Provider Type    Stephanie Hameed PTA Physical Therapy Assistant          OP Exercises     Row Name 09/23/21 0700             Precautions    Existing Precautions/Restrictions  other (see comments) recent bilat PE  -KH         Subjective Comments    Subjective Comments  Patient reports that she is doing well; Doing everything at home that she needs to be doing; Reports that she is 90% better overall;   -KH         Subjective Pain    Able to rate subjective pain?  yes  -KH      Pre-Treatment Pain Level  0  -KH      Post-Treatment Pain Level  0  -KH         Total Minutes    59053 - PT Therapeutic Exercise Minutes  42  -KH         Exercise 1    Exercise Name 1  pro ll LE ROM/Strength   -KH      Time 1  10 mins  -KH      Additional Comments  lvl 3, seat 9  -KH         Exercise 2    Exercise Name 2  recipercal stairs with one HR  -KH      Reps 2  6 staris x2  -KH         Exercise 3    Exercise Name 3  ME to anterior torsion on L  -KH      Additional Comments  checked strength  -KH         Exercise 4    Exercise Name 4  incline stretch  -KH      Sets 4  3  -KH      Time 4  30\" holds  -KH         " "Exercise 5    Exercise Name 5  SLS w/o UE assist  -KH      Reps 5  5\" holds x3  -KH         Exercise 6    Exercise Name 6  step ups fwd & lat  -KH      Cueing 6  Verbal  -KH      Sets 6  2  -KH      Reps 6  10 each   -KH      Additional Comments  6\" step   -KH         Exercise 7    Exercise Name 7  Bridges  -KH      Cueing 7  Verbal  -KH      Sets 7  2  -KH      Reps 7  10  -KH      Additional Comments  added to HEP   -KH         Exercise 8    Exercise Name 8  LAQ w/ aw  -KH      Sets 8  3  -KH      Reps 8  10  -KH      Additional Comments  2# aw  -KH        User Key  (r) = Recorded By, (t) = Taken By, (c) = Cosigned By    Initials Name Provider Type    Stephanie Hameed PTA Physical Therapy Assistant                         PT OP Goals     Row Name 09/23/21 0700          PT Short Term Goals    STG Date to Achieve  09/15/21  -KH     STG 1  AROM knee ext 0 degrees   -KH     STG 1 Progress  Met  -KH     STG 2  AROM knee flexion 110 degrees   -KH     STG 2 Progress  Met  -KH     STG 3  Ambulate with SC community distances   -KH     STG 3 Progress  Met  -KH     STG 4  Independent SLR without lag   -KH     STG 4 Progress  Met  -KH        Long Term Goals    LTG Date to Achieve  09/30/21  -KH     LTG 1  AROM R knee 0-120 or better   -     LTG 1 Progress  Met  -KH     LTG 2  Ambulate community distances independent of AD safely '  -KH     LTG 2 Progress  Met  -KH     LTG 3  R Knee MMT 5/5   -KH     LTG 3 Progress  Met  -KH     LTG 4  SLS RLE 3\" or better on level ground   -     LTG 4 Progress  Met  -KH     LTG 5  Improve LEFS score to 50 or higher  -     LTG 5 Progress  Met  -KH        Time Calculation    PT Goal Re-Cert Due Date  09/23/21  -       User Key  (r) = Recorded By, (t) = Taken By, (c) = Cosigned By    Initials Name Provider Type    Stephanie Hameed PTA Physical Therapy Assistant                  Lower Extremity Functional Index  Any of your usual work, housework or school activities: No difficulty  Your " usual hobbies, recreational or sporting activities: No difficulty  Getting into or out of the bath: Moderate difficulty  Walking between rooms: No difficulty  Putting on your shoes or socks: No difficulty  Squatting: Moderate difficulty  Lifting an object, like a bag of groceries from the floor: No difficulty  Performing light activities around your home: No difficulty  Performing heavy activities around your home: No difficulty  Getting into or out of a car: No difficulty  Walking 2 blocks: Moderate difficulty  Walking a mile: Quite a bit of difficulty  Going up or down 10 stairs (about 1 flight of stairs): No difficulty  Standing for 1 hour: No difficulty  Sitting for 1 hour: No difficulty  Running on even ground: Extreme difficulty or unable to perform activity  Running on uneven ground: Extreme difficulty or unable to perform activity  Making sharp turns while running fast: Extreme difficulty or unable to perform activity  Hopping: Extreme difficulty or unable to perform activity  Rolling over in bed: No difficulty  Total: 55      Time Calculation:   Start Time: 0758  Stop Time: 0840  Time Calculation (min): 42 min  Timed Charges  65870 - PT Therapeutic Exercise Minutes: 42  Total Minutes  Timed Charges Total Minutes: 42   Total Minutes: 42  Therapy Charges for Today     Code Description Service Date Service Provider Modifiers Qty    60947748630 HC PT THER PROC EA 15 MIN 9/23/2021 Stephanie Amanda PTA GP 3          PT G-Codes  Total: 55     OP PT Discharge Summary  Date of Discharge: 09/23/21  Reason for Discharge: All goals achieved, Independent  Outcomes Achieved: Able to achieve all goals within established timeline  Discharge Destination: Home with home program  Discharge Instructions/Additional Comments: Returns to MD 10/08/2021; to continue with HEP and to call with any questions or concerns      Stephanie Amanda PTA  9/23/2021

## 2021-09-28 ENCOUNTER — APPOINTMENT (OUTPATIENT)
Dept: PHYSICAL THERAPY | Facility: HOSPITAL | Age: 66
End: 2021-09-28

## 2021-09-30 ENCOUNTER — APPOINTMENT (OUTPATIENT)
Dept: PHYSICAL THERAPY | Facility: HOSPITAL | Age: 66
End: 2021-09-30

## 2021-10-08 ENCOUNTER — OFFICE VISIT (OUTPATIENT)
Dept: ORTHOPEDIC SURGERY | Facility: CLINIC | Age: 66
End: 2021-10-08

## 2021-10-08 VITALS — WEIGHT: 178 LBS | BODY MASS INDEX: 27.94 KG/M2 | HEIGHT: 67 IN

## 2021-10-08 DIAGNOSIS — Z79.01 LONG TERM CURRENT USE OF ANTICOAGULANT THERAPY: ICD-10-CM

## 2021-10-08 DIAGNOSIS — Z96.651 PRESENCE OF TOTAL KNEE JOINT PROSTHESIS, RIGHT: Primary | ICD-10-CM

## 2021-10-08 DIAGNOSIS — I26.99 BILATERAL PULMONARY EMBOLISM (HCC): ICD-10-CM

## 2021-10-08 PROCEDURE — 99024 POSTOP FOLLOW-UP VISIT: CPT | Performed by: ORTHOPAEDIC SURGERY

## 2021-10-08 RX ORDER — FEXOFENADINE HCL 180 MG/1
180 TABLET ORAL DAILY
COMMUNITY

## 2021-10-08 NOTE — PROGRESS NOTES
Ann Marie Malik is a 65 y.o. female is s/p     08/23/21 (6w 4d) Chris Covington MD    RIGHT TOTAL KNEE ARTHROPLASTY  with adductor canal block - Right          Chief Complaint   Patient presents with   • Right Knee - Follow-up       HISTORY OF PRESENT ILLNESS:  Patient in for post-op follow up on right knee  Patient reports, she is finished with therapy  No numbness or tingling  She reports no problems.  She states that she is doing much better than she was prior to surgery.  No fevers or chills.  She continues on Eliquis due to postoperative PE.       Allergies   Allergen Reactions   • Adhesive Tape Other (See Comments)     Pulls skin off   • Wound Dressing Adhesive Rash         Current Outpatient Medications:   •  acetaminophen (TYLENOL) 650 MG 8 hr tablet, Take 650 mg by mouth Every 8 (Eight) Hours As Needed for Mild Pain ., Disp: , Rfl:   •  albuterol sulfate  (90 Base) MCG/ACT inhaler, Inhale 2 puffs Every 6 (Six) Hours As Needed for Wheezing., Disp: 18 g, Rfl: 0  •  apixaban (ELIQUIS) 5 MG tablet tablet, Take 1 tablet by mouth Every 12 (Twelve) Hours. Indications: DVT/PE (active thrombosis), Disp: 60 tablet, Rfl: 2  •  atorvastatin (LIPITOR) 40 MG tablet, Take 40 mg by mouth Every Night., Disp: , Rfl:   •  fexofenadine (ALLEGRA) 180 MG tablet, Take 180 mg by mouth Daily., Disp: , Rfl:   •  hydroCHLOROthiazide (HYDRODIURIL) 25 MG tablet, Take 25 mg by mouth Daily., Disp: , Rfl:   •  omeprazole (priLOSEC) 20 MG capsule, Take 20 mg by mouth Daily., Disp: , Rfl:   •  oxyCODONE-acetaminophen (PERCOCET) 7.5-325 MG per tablet, Take 1 tablet by mouth Every 8 (Eight) Hours As Needed for Moderate Pain ., Disp: 30 tablet, Rfl: 0  •  valsartan (Diovan) 160 MG tablet, Take 1 tablet by mouth Daily., Disp: 90 tablet, Rfl: 3  •  VITAMIN E PO, Take 180 mg by mouth 2 (Two) Times a Day., Disp: , Rfl:     No fevers or chills.  No nausea or vomiting.      PHYSICAL EXAMINATION:       Ann Marie Malik is a  65 y.o. female    Patient is awake and alert, answers questions appropriately and is in no apparent distress.    GAIT:     [x]  Normal  []  Antalgic    Assistive device: [x]  None  []  Walker     []  Crutches  []  Cane     []  Wheelchair  []  Stretcher    Right Knee Exam     Muscle Strength   The patient has normal right knee strength.    Tenderness   The patient is experiencing no tenderness.     Range of Motion   Extension: 0   Flexion: 130     Tests   Varus: negative Valgus: negative    Other   Erythema: absent  Scars: present  Sensation: normal  Pulse: present  Swelling: mild                      ASSESSMENT:    Diagnoses and all orders for this visit:    Presence of total knee joint prosthesis, right    Bilateral pulmonary embolism (HCC)    Long term current use of anticoagulant therapy    Other orders  -     fexofenadine (ALLEGRA) 180 MG tablet; Take 180 mg by mouth Daily.          PLAN    She will continue with general strength and conditioning exercises.  She will continue to slowly progress activity as pain allows.  She is going to continue with Eliquis for 6 months postoperatively per the vascular service.  No restrictions.  Activity as tolerated.  Follow-up in 8 weeks with repeat x-rays.    Return in about 8 weeks (around 12/3/2021) for Recheck with repeat xrays.    Chris Covington MD

## 2021-10-10 PROBLEM — M23.300 DEGENERATIVE TEAR OF LATERAL MENISCUS OF RIGHT KNEE: Status: RESOLVED | Noted: 2020-03-23 | Resolved: 2021-10-10

## 2021-10-10 PROBLEM — G89.29 CHRONIC PAIN OF RIGHT KNEE: Status: RESOLVED | Noted: 2019-01-23 | Resolved: 2021-10-10

## 2021-10-10 PROBLEM — M71.21 SYNOVIAL CYST OF RIGHT POPLITEAL SPACE: Status: RESOLVED | Noted: 2020-03-23 | Resolved: 2021-10-10

## 2021-10-10 PROBLEM — M17.11 PRIMARY OSTEOARTHRITIS OF RIGHT KNEE: Status: RESOLVED | Noted: 2020-03-08 | Resolved: 2021-10-10

## 2021-10-10 PROBLEM — M23.91 INTERNAL DERANGEMENT OF RIGHT KNEE: Status: RESOLVED | Noted: 2020-03-08 | Resolved: 2021-10-10

## 2021-10-10 PROBLEM — S83.271A COMPLEX TEAR OF LATERAL MENISCUS OF RIGHT KNEE AS CURRENT INJURY: Status: RESOLVED | Noted: 2021-07-20 | Resolved: 2021-10-10

## 2021-10-10 PROBLEM — M94.261 CHONDROMALACIA OF RIGHT KNEE: Status: RESOLVED | Noted: 2020-03-23 | Resolved: 2021-10-10

## 2021-10-10 PROBLEM — M25.561 CHRONIC PAIN OF RIGHT KNEE: Status: RESOLVED | Noted: 2019-01-23 | Resolved: 2021-10-10

## 2021-10-11 ENCOUNTER — OFFICE VISIT (OUTPATIENT)
Dept: FAMILY MEDICINE CLINIC | Facility: CLINIC | Age: 66
End: 2021-10-11

## 2021-10-11 VITALS
HEART RATE: 75 BPM | DIASTOLIC BLOOD PRESSURE: 84 MMHG | SYSTOLIC BLOOD PRESSURE: 110 MMHG | OXYGEN SATURATION: 95 % | HEIGHT: 67 IN | BODY MASS INDEX: 27.31 KG/M2 | WEIGHT: 174 LBS

## 2021-10-11 DIAGNOSIS — I26.99 BILATERAL PULMONARY EMBOLISM (HCC): ICD-10-CM

## 2021-10-11 DIAGNOSIS — Z78.0 POSTMENOPAUSAL: ICD-10-CM

## 2021-10-11 DIAGNOSIS — I10 ESSENTIAL HYPERTENSION: Primary | ICD-10-CM

## 2021-10-11 DIAGNOSIS — Z23 FLU VACCINE NEED: ICD-10-CM

## 2021-10-11 DIAGNOSIS — Z12.31 ENCOUNTER FOR SCREENING MAMMOGRAM FOR MALIGNANT NEOPLASM OF BREAST: ICD-10-CM

## 2021-10-11 DIAGNOSIS — Z96.651 STATUS POST TOTAL KNEE REPLACEMENT, RIGHT: ICD-10-CM

## 2021-10-11 DIAGNOSIS — E78.2 MIXED HYPERLIPIDEMIA: ICD-10-CM

## 2021-10-11 PROCEDURE — 90662 IIV NO PRSV INCREASED AG IM: CPT | Performed by: FAMILY MEDICINE

## 2021-10-11 PROCEDURE — G0008 ADMIN INFLUENZA VIRUS VAC: HCPCS | Performed by: FAMILY MEDICINE

## 2021-10-11 PROCEDURE — 99214 OFFICE O/P EST MOD 30 MIN: CPT | Performed by: FAMILY MEDICINE

## 2021-10-11 RX ORDER — ATORVASTATIN CALCIUM 40 MG/1
40 TABLET, FILM COATED ORAL NIGHTLY
Qty: 90 TABLET | Refills: 3 | Status: SHIPPED | OUTPATIENT
Start: 2021-10-11 | End: 2022-09-27 | Stop reason: SDUPTHER

## 2021-10-11 NOTE — PROGRESS NOTES
"Chief Complaint  Hypertension (7 week recheck)    Subjective          Ann Marie Malik presents to [facility] [place]  History of Present Illness    Patient presents today for follow-up.  Chronic medical problems include hypertension and hyperlipidemia.  Had recent right total knee replacement, with bilateral pulmonary embolism in postop course.  Breathing is doing well.  Patient is no longer requiring supplemental oxygen, has been off for about 2 weeks.  Monitoring oxygen saturations at home.  Patient has good range of motion of her knee, no longer in physical therapy.  She rides stationary bike for about 15 minutes daily.  Was recommended by therapy to continue quad exercises as well.  Blood pressure has been well controlled, patient taking valsartan 160 mg daily and hydrochlorothiazide 25 mg daily.  On Lipitor 40 mg nightly for hyperlipidemia.  No new concerns today.  Patient following with cardiothoracic surgeon for bilateral pulmonary embolism, plan for repeat imaging in 3 to 6 months, with anticoagulation on Eliquis for at least 6 months total.        Objective   Vital Signs:   /84   Pulse 75   Ht 170.2 cm (67\")   Wt 78.9 kg (174 lb)   SpO2 95%   BMI 27.25 kg/m²     Physical Exam  Vitals reviewed.   Constitutional:       General: She is not in acute distress.     Appearance: She is well-developed.   HENT:      Head: Normocephalic and atraumatic.   Cardiovascular:      Rate and Rhythm: Normal rate and regular rhythm.      Heart sounds: Normal heart sounds. No murmur heard.      Pulmonary:      Effort: Pulmonary effort is normal. No respiratory distress.      Breath sounds: Normal breath sounds. No wheezing or rales.   Abdominal:      Palpations: Abdomen is soft.      Tenderness: There is no abdominal tenderness.   Musculoskeletal:      Cervical back: Neck supple.      Right knee: Normal range of motion (good range of motion).   Skin:     General: " Skin is warm and dry.      Findings: No rash.   Neurological:      Mental Status: She is alert and oriented to person, place, and time.        Result Review :   The following data was reviewed by: Verito He MD on 10/11/2021:  Common labs    Common Labsle 8/28/21 8/28/21 8/28/21 8/29/21 8/30/21    0532 0532 1848     Glucose  110 (A)   101 (A)   BUN  11   11   Creatinine  0.72   0.72   eGFR Non African Am  81   81   Sodium  138   139   Potassium  3.3 (A) 4.2 4.5 3.9   Chloride  102   102   Calcium  8.9   9.3   WBC 5.48       Hemoglobin 11.2 (A)       Hematocrit 33.8 (A)       Platelets 236       (A) Abnormal value                      Assessment and Plan    Diagnoses and all orders for this visit:    1. Essential hypertension (Primary)    2. Mixed hyperlipidemia  -     atorvastatin (LIPITOR) 40 MG tablet; Take 1 tablet by mouth Every Night.  Dispense: 90 tablet; Refill: 3    3. Status post total knee replacement, right    4. Bilateral pulmonary embolism (HCC)  -     apixaban (ELIQUIS) 5 MG tablet tablet; Take 1 tablet by mouth Every 12 (Twelve) Hours. Lot: [id number]  Exp: Aug 2023  Dispense: 56 tablet; Refill: 0    5. Encounter for screening mammogram for malignant neoplasm of breast  -     Mammo Screening Digital Tomosynthesis Bilateral With CAD; Future    6. Postmenopausal  -     DEXA Bone Density Axial; Future    7. Flu vaccine need      Patient presents for follow up.  Blood pressure well controlled, continue current antihypertensive medications.  Refill of atorvastatin provided today.  Patient doing well status post knee replacement, continue with stationary bike and range of motion exercises.  Breathing improved, no longer requiring oxygen supplementation for bilateral pulmonary embolism.  Continuing anticoagulation with Eliquis, samples provided today.  Due for mammogram and bone scan, ordered today.  Patient desires flu vaccine.      Follow Up   Return in about 4 months (around 2/11/2022) for  Recheck.  Patient was given instructions and counseling regarding her condition or for health maintenance advice. Please see specific information pulled into the AVS if appropriate.         This document has been electronically signed by Verito He MD

## 2021-11-02 ENCOUNTER — TELEPHONE (OUTPATIENT)
Dept: FAMILY MEDICINE CLINIC | Facility: CLINIC | Age: 66
End: 2021-11-02

## 2021-11-02 NOTE — TELEPHONE ENCOUNTER
Per LUIS Briggs, Ms. Malik has been called with recent DEXA Bone Density Scan results & recommendations.  Continue current medications and follow-up as planned or sooner if any problems.       ----- Message from Verito He MD sent at 11/2/2021  4:58 PM CDT -----  Please call and let patient know that DEXA bone scan showed normal bone density.  Would recommend healthy diet and regular weight bearing exercise like walking for bone health.  Will plan to repeat for screening in 2 years.  Thanks, KEVAN He

## 2021-11-05 ENCOUNTER — TRANSCRIBE ORDERS (OUTPATIENT)
Dept: LAB | Facility: HOSPITAL | Age: 66
End: 2021-11-05

## 2021-11-05 ENCOUNTER — LAB (OUTPATIENT)
Dept: LAB | Facility: HOSPITAL | Age: 66
End: 2021-11-05

## 2021-11-05 DIAGNOSIS — K75.81 NONALCOHOLIC STEATOHEPATITIS: Primary | ICD-10-CM

## 2021-11-05 DIAGNOSIS — K75.81 NONALCOHOLIC STEATOHEPATITIS: ICD-10-CM

## 2021-11-05 PROCEDURE — 80053 COMPREHEN METABOLIC PANEL: CPT

## 2021-11-05 PROCEDURE — 36415 COLL VENOUS BLD VENIPUNCTURE: CPT

## 2021-11-06 LAB
ALBUMIN SERPL-MCNC: 4.6 G/DL (ref 3.5–5.2)
ALBUMIN/GLOB SERPL: 1.9 G/DL
ALP SERPL-CCNC: 170 U/L (ref 39–117)
ALT SERPL W P-5'-P-CCNC: 17 U/L (ref 1–33)
ANION GAP SERPL CALCULATED.3IONS-SCNC: 9.3 MMOL/L (ref 5–15)
AST SERPL-CCNC: 19 U/L (ref 1–32)
BILIRUB SERPL-MCNC: 0.5 MG/DL (ref 0–1.2)
BUN SERPL-MCNC: 21 MG/DL (ref 8–23)
BUN/CREAT SERPL: 26.6 (ref 7–25)
CALCIUM SPEC-SCNC: 9.8 MG/DL (ref 8.6–10.5)
CHLORIDE SERPL-SCNC: 101 MMOL/L (ref 98–107)
CO2 SERPL-SCNC: 25.7 MMOL/L (ref 22–29)
CREAT SERPL-MCNC: 0.79 MG/DL (ref 0.57–1)
GFR SERPL CREATININE-BSD FRML MDRD: 73 ML/MIN/1.73
GLOBULIN UR ELPH-MCNC: 2.4 GM/DL
GLUCOSE SERPL-MCNC: 92 MG/DL (ref 65–99)
POTASSIUM SERPL-SCNC: 3.6 MMOL/L (ref 3.5–5.2)
PROT SERPL-MCNC: 7 G/DL (ref 6–8.5)
SODIUM SERPL-SCNC: 136 MMOL/L (ref 136–145)

## 2021-12-02 DIAGNOSIS — Z96.651 PRESENCE OF TOTAL KNEE JOINT PROSTHESIS, RIGHT: Primary | ICD-10-CM

## 2021-12-03 ENCOUNTER — TELEPHONE (OUTPATIENT)
Dept: FAMILY MEDICINE CLINIC | Facility: CLINIC | Age: 66
End: 2021-12-03

## 2021-12-03 ENCOUNTER — OFFICE VISIT (OUTPATIENT)
Dept: ORTHOPEDIC SURGERY | Facility: CLINIC | Age: 66
End: 2021-12-03

## 2021-12-03 VITALS — BODY MASS INDEX: 27.78 KG/M2 | HEIGHT: 67 IN | WEIGHT: 177 LBS

## 2021-12-03 DIAGNOSIS — Z79.01 LONG TERM CURRENT USE OF ANTICOAGULANT THERAPY: ICD-10-CM

## 2021-12-03 DIAGNOSIS — M17.11 PRIMARY OSTEOARTHRITIS OF RIGHT KNEE: ICD-10-CM

## 2021-12-03 DIAGNOSIS — Z96.651 PRESENCE OF TOTAL KNEE JOINT PROSTHESIS, RIGHT: Primary | ICD-10-CM

## 2021-12-03 PROCEDURE — 99213 OFFICE O/P EST LOW 20 MIN: CPT | Performed by: ORTHOPAEDIC SURGERY

## 2021-12-03 NOTE — PROGRESS NOTES
"Ann Marie Malik is a 66 y.o. female returns for     Chief Complaint   Patient presents with   • Right Knee - Follow-up       HISTORY OF PRESENT ILLNESS:  Follow up Right knee with Xray  No problems.  She states she is doing much better than she was prior to surgery.  She has some occasional burning on the outside of her knee.  Her pain is significantly better than it was 6 weeks ago.  No fevers or chills.  No numbness or tingling.     08/23/21 (3m 10d) Chris Covington MD    RIGHT TOTAL KNEE ARTHROPLASTY  with adductor canal block - Right            CONCURRENT MEDICAL HISTORY:    The following portions of the patient's history were reviewed and updated as appropriate: allergies, current medications, past family history, past medical history, past social history, past surgical history and problem list.     ROS  No fevers or chills.  No chest pain or shortness of air.  No GI or  disturbances.    PHYSICAL EXAMINATION:       Ht 170.2 cm (67\")   Wt 80.3 kg (177 lb)   BMI 27.72 kg/m²     Physical Exam  Constitutional:       General: She is not in acute distress.     Appearance: Normal appearance.   Pulmonary:      Effort: Pulmonary effort is normal. No respiratory distress.   Neurological:      Mental Status: She is alert and oriented to person, place, and time.         GAIT:     [x]  Normal  []  Antalgic    Assistive device: [x]  None  []  Walker     []  Crutches  []  Cane     []  Wheelchair  []  Stretcher    Right Knee Exam     Comments:  Well-healed scar.  No erythema.  No significant swelling.  Range of motion 0 to 130 degrees.  Good distal pulses and sensation.              XR Knee 1 or 2 View Right    Result Date: 12/5/2021  Narrative: Ordering Provider:  Chris Covington MD Ordering Diagnosis/Indication:  Presence of total knee joint prosthesis, right Procedure:  XR KNEE 1 OR 2 VW RIGHT Exam Date:  12/3/21 COMPARISON:  Todays X-rays were compared to previous images dated September 7, 2021. "     Impression:  AP bilateral standing of the knees with lateral of the right knee shows acceptable position alignment of a right total knee arthroplasty.  No sign of implant loosening or failure is noted.  No interval change noted in comparison to prior x-rays.  The left knee shows mild medial joint space narrowing also unchanged from prior x-rays. Chris Covington MD 12/3/21     XR Knee Bilateral AP Standing    Result Date: 12/5/2021  Narrative: Ordering Provider:  Chris Covington MD Ordering Diagnosis/Indication:  Presence of total knee joint prosthesis, right Procedure:  XR KNEE BILATERAL AP STANDING Exam Date:  12/3/21 COMPARISON:  Todays X-rays were compared to previous images dated September 7, 2021.     Impression:  AP bilateral standing of the knees with lateral of the right knee shows acceptable position alignment of a right total knee arthroplasty.  No sign of implant loosening or failure is noted.  No interval change noted in comparison to prior x-rays.  The left knee shows mild medial joint space narrowing also unchanged from prior x-rays. Chris Covington MD 12/3/21     Mammo Screening Digital Tomosynthesis Bilateral With CAD    Result Date: 12/3/2021  Narrative: PROCEDURE: Bilateral digital screening mammogram HISTORY: Routine screening mammography. COMPARISON: 8/24/20 and 8/6/19 NOTE: Computer-aided detection was utilized during this exam. Digital breast tomosynthesis was performed. FINDINGS: CC and MLO views are obtained of both breasts. Parenchymal pattern: There are scattered areas of fibroglandular density.  No suspicious mass, architectural distortion or suspicious microcalcifications.     Impression: No mammographic evidence of malignancy.  In the absence of suspicious clinical or physical findings, routine follow-up mammography is recommended in one year. BIRADS Category 2: Benign findings Electronically signed by:  Alexandro Alvarez MD  12/3/2021 12:01 PM CST Workstation:  [id number]            ASSESSMENT:    Diagnoses and all orders for this visit:    Presence of total knee joint prosthesis, right    Primary osteoarthritis of right knee    Long term current use of anticoagulant therapy          PLAN    Continue to slowly progress general strengthening conditioning exercises.  No restrictions.  Increase activity as pain allows.  Continue to work on swelling modalities.  Follow-up approximately 1 year postoperatively with repeat x-rays.    Return in about 9 months (around 9/3/2022) for Recheck with repeat xrays.    Chris Covington MD

## 2021-12-03 NOTE — TELEPHONE ENCOUNTER
Per Dr. He, Ms. Malik has been called with recent Screening Mammogram results & recommendations.  Continue current medications and follow-up as planned or sooner if any problems.       ----- Message from Verito He MD sent at 12/3/2021  2:23 PM CST -----  Please call and let patient know that mammogram is normal.  Plan to repeat in 1 year.  Thanks, KEVAN He

## 2021-12-20 ENCOUNTER — OFFICE VISIT (OUTPATIENT)
Dept: CARDIAC SURGERY | Facility: CLINIC | Age: 66
End: 2021-12-20

## 2021-12-20 VITALS
SYSTOLIC BLOOD PRESSURE: 112 MMHG | WEIGHT: 181 LBS | OXYGEN SATURATION: 99 % | HEART RATE: 84 BPM | HEIGHT: 67 IN | BODY MASS INDEX: 28.41 KG/M2 | DIASTOLIC BLOOD PRESSURE: 72 MMHG

## 2021-12-20 DIAGNOSIS — I26.99 BILATERAL PULMONARY EMBOLISM (HCC): Primary | ICD-10-CM

## 2021-12-20 DIAGNOSIS — Z71.89 ENCOUNTER FOR ANTICOAGULATION DISCUSSION AND COUNSELING: ICD-10-CM

## 2021-12-20 PROCEDURE — 99214 OFFICE O/P EST MOD 30 MIN: CPT | Performed by: NURSE PRACTITIONER

## 2021-12-20 NOTE — PATIENT INSTRUCTIONS
CTA chest in 2 months, if negative will plan to obtain rest of hypercoagulable labs.      Adverse bleeding events that require evaluation includes blood in the urine, stool, gums, and nose.  If you are to experience these symptoms you should present to the heart and vascular center for evaluation.  Avoid NSAID's such as ibuprofen and naproxen for pain relief as these medications increase your risk of gastrointestinal bleeding.  Over the counter supplements such as garlic, gingko biloba, and other herbs may increase bleeding risks. Do not stop your medication unless directed by a provider, take care in making sure you have adequate supply so that you are not without medication. If this medicine becomes unaffordable please contact heart and vascular center for evaluation before discontinuing.

## 2021-12-21 NOTE — PROGRESS NOTES
CVTS Office Progress Note     12/21/2021    Ann Marie Malik  1955    Chief Complaint:    Chief Complaint   Patient presents with   • Pulmonary Embolism       HPI:      PCP:  Verito He MD     65 y.o. female with HTN(stable, increased risk stroke, rupture) and Hyperlipidemia(stable, increased risk cardiovascular events)  GERD, OA, prior CVA no residual. former smoker and quit 200.  Total R knee replacement earlier this week, developed some shortness of air x2 days at PT where O2 saturation was obtained in the low 80's.  Reported to emergency department and was found to have small bilateral PE's.   CTVS consulted for treatment recs.  Denies FH of VTE, 1st event provoked with recent surgery.  Returns today in follow up.  Now on room air  No other associated symptoms or modifying factors.     8/2021 CTA Chest: Small bilateral PE  8/2021 Bilateral venous: Negative for DVT  8/221 TTE: mild reduction in RV function, RVSP 46, EF 60%, mild TR  12/2021 TTE: EF 60%, grade I DD, RVSP 28, normal RV function      The following portions of the patient's history were reviewed and updated as appropriate: allergies, current medications, past family history, past medical history, past social history, past surgical history and problem list.  Recent images independently reviewed.  Available laboratory values reviewed.    PMH:  Past Medical History:   Diagnosis Date   • Breast cyst    • GERD (gastroesophageal reflux disease)    • Hyperlipidemia    • Hypertension    • Osteoarthritis    • Pulmonary emboli (HCC)    • Stroke (HCC)      Past Surgical History:   Procedure Laterality Date   • BREAST CYST ASPIRATION     • TOTAL KNEE ARTHROPLASTY Right 8/23/2021     Family History   Problem Relation Age of Onset   • Hypertension Mother    • Hyperlipidemia Father    • Cancer Sister    • Breast cancer Sister    • Breast cancer Maternal Grandmother      Social History     Tobacco Use   • Smoking status: Former Smoker      Packs/day: 1.00     Years: 25.00     Pack years: 25.00     Types: Cigarettes     Quit date:      Years since quittin.9   • Smokeless tobacco: Never Used   Vaping Use   • Vaping Use: Never used   Substance Use Topics   • Alcohol use: No   • Drug use: Never       ALLERGIES:  Allergies   Allergen Reactions   • Adhesive Tape Other (See Comments)     Pulls skin off   • Wound Dressing Adhesive Rash         MEDICATIONS:    Current Outpatient Medications:   •  acetaminophen (TYLENOL) 650 MG 8 hr tablet, Take 650 mg by mouth Every 8 (Eight) Hours As Needed for Mild Pain ., Disp: , Rfl:   •  albuterol sulfate  (90 Base) MCG/ACT inhaler, Inhale 2 puffs Every 6 (Six) Hours As Needed for Wheezing., Disp: 18 g, Rfl: 0  •  apixaban (ELIQUIS) 5 MG tablet tablet, Take 1 tablet by mouth Every 12 (Twelve) Hours. Lot: [id number] Exp: Aug 2023, Disp: 56 tablet, Rfl: 0  •  atorvastatin (LIPITOR) 40 MG tablet, Take 1 tablet by mouth Every Night., Disp: 90 tablet, Rfl: 3  •  fexofenadine (ALLEGRA) 180 MG tablet, Take 180 mg by mouth Daily., Disp: , Rfl:   •  hydroCHLOROthiazide (HYDRODIURIL) 25 MG tablet, Take 25 mg by mouth Daily., Disp: , Rfl:   •  omeprazole (priLOSEC) 20 MG capsule, Take 20 mg by mouth Daily., Disp: , Rfl:   •  valsartan (Diovan) 160 MG tablet, Take 1 tablet by mouth Daily., Disp: 90 tablet, Rfl: 3  •  VITAMIN E PO, Take 180 mg by mouth 2 (Two) Times a Day., Disp: , Rfl:       Review of Systems   Constitutional: Negative for chills, decreased appetite, fever and weight loss.   HENT: Negative for congestion, nosebleeds and sore throat.    Eyes: Negative for blurred vision, visual disturbance and visual halos.   Cardiovascular: Negative for chest pain, dyspnea on exertion and leg swelling.   Respiratory: Negative for cough, shortness of breath, sputum production and wheezing.    Endocrine: Negative for cold intolerance and polyuria.   Hematologic/Lymphatic: Negative for bleeding problem. Does not  "bruise/bleed easily.   Skin: Negative for flushing, nail changes and unusual hair distribution.   Musculoskeletal: Positive for arthritis and joint pain. Negative for back pain.   Gastrointestinal: Negative for bloating, abdominal pain, hematemesis, melena, nausea and vomiting.   Genitourinary: Negative for flank pain and hematuria.   Neurological: Negative for brief paralysis, difficulty with concentration, focal weakness, light-headedness, loss of balance, numbness, paresthesias and weakness.   Psychiatric/Behavioral: Negative for altered mental status, depression, substance abuse and suicidal ideas.   Allergic/Immunologic: Negative for hives and persistent infections.         Vitals:    12/20/21 1046   BP: 112/72   BP Location: Left arm   Patient Position: Sitting   Cuff Size: Adult   Pulse: 84   SpO2: 99%   Weight: 82.1 kg (181 lb)   Height: 170.2 cm (67\")     Physical Exam  Vitals and nursing note reviewed.   Constitutional:       Appearance: Normal appearance.   HENT:      Head: Normocephalic.      Nose: Nose normal.      Mouth/Throat:      Mouth: Mucous membranes are moist.   Eyes:      Pupils: Pupils are equal, round, and reactive to light.   Cardiovascular:      Rate and Rhythm: Normal rate.      Pulses: Normal pulses.   Pulmonary:      Effort: Pulmonary effort is normal.      Comments: Now on room air  Abdominal:      General: Abdomen is flat.      Palpations: Abdomen is soft.   Musculoskeletal:         General: Normal range of motion.      Cervical back: Normal range of motion.   Skin:     General: Skin is warm and dry.      Capillary Refill: Capillary refill takes 2 to 3 seconds.   Neurological:      General: No focal deficit present.      Mental Status: She is alert and oriented to person, place, and time.   Psychiatric:         Mood and Affect: Mood normal.         Assessment & Plan     Independent Review of Studies  12/2021 TTE: EF 60%, grade I DD, RVSP 28, normal RV function    1. Bilateral pulmonary " embolism (CMS/HCC)  1st provoked event.       Factor V, Factor II, homocysteine negative.  Completion of anti-phospholipid, lupus anticoagulant, antithrombin III, and C/S proteins once anticoagulant therapy has been completed.      Anticoagulation x6 months     Extensive risks and benefits discussion regarding selection of anticoagulant.  Pro's/con's of DOAC vs. Vitamin K antagonist discussed. Patient understands and wishes to proceed with current plan.  Patient instructed to monitor for signs of adverse bleeding event not limited to but including hematemesis, hematuria, hematochezia.  In the event of trauma or fall patient is advised to undergo evaluation at nearest emergency department.       Echocardiogram with improvement in RV function, RVSP    CTA of chest in 3 months to confirm resolution    2. Encounter for anticoagulation discussion and counseling  Adverse bleeding events that require evaluation includes blood in the urine, stool, gums, and nose.  If you are to experience these symptoms you should present to the heart and vascular center for evaluation.  Avoid NSAID's such as ibuprofen and naproxen for pain relief as these medications increase your risk of gastrointestinal bleeding.  Over the counter supplements such as garlic, gingko biloba, and other herbs may increase bleeding risks.     Using samples, copay 400$.  If requires long term anticoagulation will have to review insurance and options.     Detailed discussion regarding risks, benefits, and treatment plan. Images independently reviewed. Patient understands, agrees, and wishes to proceed with plan.       This document has been electronically signed by PANDA Gaona-BC @  On December 21, 2021 16:06 CST

## 2022-02-02 ENCOUNTER — TRANSCRIBE ORDERS (OUTPATIENT)
Dept: GENERAL RADIOLOGY | Facility: HOSPITAL | Age: 67
End: 2022-02-02

## 2022-02-02 DIAGNOSIS — I26.99 BILATERAL PULMONARY EMBOLISM: Primary | ICD-10-CM

## 2022-02-07 ENCOUNTER — HOSPITAL ENCOUNTER (OUTPATIENT)
Dept: CT IMAGING | Facility: HOSPITAL | Age: 67
Discharge: HOME | End: 2022-02-07

## 2022-02-07 ENCOUNTER — LAB (OUTPATIENT)
Dept: LAB | Facility: HOSPITAL | Age: 67
End: 2022-02-07

## 2022-02-07 DIAGNOSIS — I26.99 BILATERAL PULMONARY EMBOLISM: ICD-10-CM

## 2022-02-07 LAB
BUN SERPL-MCNC: 16 MG/DL (ref 8–23)
CREAT SERPL-MCNC: 0.78 MG/DL (ref 0.57–1)
GFR SERPL CREATININE-BSD FRML MDRD: 74 ML/MIN/1.73

## 2022-02-07 PROCEDURE — 0 IOPAMIDOL PER 1 ML: Performed by: NURSE PRACTITIONER

## 2022-02-07 PROCEDURE — 71275 CT ANGIOGRAPHY CHEST: CPT

## 2022-02-07 PROCEDURE — 82565 ASSAY OF CREATININE: CPT

## 2022-02-07 PROCEDURE — 84520 ASSAY OF UREA NITROGEN: CPT

## 2022-02-07 PROCEDURE — 36415 COLL VENOUS BLD VENIPUNCTURE: CPT

## 2022-02-07 RX ADMIN — IOPAMIDOL 51 ML: 755 INJECTION, SOLUTION INTRAVENOUS at 12:53

## 2022-02-11 ENCOUNTER — LAB (OUTPATIENT)
Dept: LAB | Facility: HOSPITAL | Age: 67
End: 2022-02-11

## 2022-02-11 ENCOUNTER — OFFICE VISIT (OUTPATIENT)
Dept: FAMILY MEDICINE CLINIC | Facility: CLINIC | Age: 67
End: 2022-02-11

## 2022-02-11 VITALS
BODY MASS INDEX: 28.22 KG/M2 | HEART RATE: 69 BPM | RESPIRATION RATE: 18 BRPM | DIASTOLIC BLOOD PRESSURE: 64 MMHG | HEIGHT: 67 IN | OXYGEN SATURATION: 98 % | WEIGHT: 179.8 LBS | SYSTOLIC BLOOD PRESSURE: 122 MMHG

## 2022-02-11 DIAGNOSIS — Z96.651 STATUS POST TOTAL KNEE REPLACEMENT, RIGHT: ICD-10-CM

## 2022-02-11 DIAGNOSIS — I26.99 BILATERAL PULMONARY EMBOLISM: ICD-10-CM

## 2022-02-11 DIAGNOSIS — E78.2 MIXED HYPERLIPIDEMIA: ICD-10-CM

## 2022-02-11 DIAGNOSIS — I10 ESSENTIAL HYPERTENSION: ICD-10-CM

## 2022-02-11 DIAGNOSIS — I10 ESSENTIAL HYPERTENSION: Primary | ICD-10-CM

## 2022-02-11 LAB
ALBUMIN SERPL-MCNC: 4.8 G/DL (ref 3.5–5.2)
ALBUMIN/GLOB SERPL: 2 G/DL
ALP SERPL-CCNC: 173 U/L (ref 39–117)
ALT SERPL W P-5'-P-CCNC: 35 U/L (ref 1–33)
ANION GAP SERPL CALCULATED.3IONS-SCNC: 7.3 MMOL/L (ref 5–15)
AST SERPL-CCNC: 32 U/L (ref 1–32)
BASOPHILS # BLD AUTO: 0.06 10*3/MM3 (ref 0–0.2)
BASOPHILS NFR BLD AUTO: 1.3 % (ref 0–1.5)
BILIRUB SERPL-MCNC: 0.5 MG/DL (ref 0–1.2)
BUN SERPL-MCNC: 12 MG/DL (ref 8–23)
BUN/CREAT SERPL: 16.9 (ref 7–25)
CALCIUM SPEC-SCNC: 9.9 MG/DL (ref 8.6–10.5)
CHLORIDE SERPL-SCNC: 100 MMOL/L (ref 98–107)
CHOLEST SERPL-MCNC: 182 MG/DL (ref 0–200)
CO2 SERPL-SCNC: 29.7 MMOL/L (ref 22–29)
CREAT SERPL-MCNC: 0.71 MG/DL (ref 0.57–1)
DEPRECATED RDW RBC AUTO: 42.6 FL (ref 37–54)
EOSINOPHIL # BLD AUTO: 0.22 10*3/MM3 (ref 0–0.4)
EOSINOPHIL NFR BLD AUTO: 4.9 % (ref 0.3–6.2)
ERYTHROCYTE [DISTWIDTH] IN BLOOD BY AUTOMATED COUNT: 12.4 % (ref 12.3–15.4)
GFR SERPL CREATININE-BSD FRML MDRD: 82 ML/MIN/1.73
GLOBULIN UR ELPH-MCNC: 2.4 GM/DL
GLUCOSE SERPL-MCNC: 92 MG/DL (ref 65–99)
HCT VFR BLD AUTO: 44.3 % (ref 34–46.6)
HDLC SERPL-MCNC: 52 MG/DL (ref 40–60)
HGB BLD-MCNC: 15.2 G/DL (ref 12–15.9)
IMM GRANULOCYTES # BLD AUTO: 0.03 10*3/MM3 (ref 0–0.05)
IMM GRANULOCYTES NFR BLD AUTO: 0.7 % (ref 0–0.5)
LDLC SERPL CALC-MCNC: 72 MG/DL (ref 0–100)
LDLC/HDLC SERPL: 1.09 {RATIO}
LYMPHOCYTES # BLD AUTO: 1.56 10*3/MM3 (ref 0.7–3.1)
LYMPHOCYTES NFR BLD AUTO: 35.1 % (ref 19.6–45.3)
MCH RBC QN AUTO: 32.6 PG (ref 26.6–33)
MCHC RBC AUTO-ENTMCNC: 34.3 G/DL (ref 31.5–35.7)
MCV RBC AUTO: 95.1 FL (ref 79–97)
MONOCYTES # BLD AUTO: 0.46 10*3/MM3 (ref 0.1–0.9)
MONOCYTES NFR BLD AUTO: 10.3 % (ref 5–12)
NEUTROPHILS NFR BLD AUTO: 2.12 10*3/MM3 (ref 1.7–7)
NEUTROPHILS NFR BLD AUTO: 47.7 % (ref 42.7–76)
NRBC BLD AUTO-RTO: 0 /100 WBC (ref 0–0.2)
PLATELET # BLD AUTO: 310 10*3/MM3 (ref 140–450)
PMV BLD AUTO: 9.5 FL (ref 6–12)
POTASSIUM SERPL-SCNC: 4.5 MMOL/L (ref 3.5–5.2)
PROT SERPL-MCNC: 7.2 G/DL (ref 6–8.5)
RBC # BLD AUTO: 4.66 10*6/MM3 (ref 3.77–5.28)
SODIUM SERPL-SCNC: 137 MMOL/L (ref 136–145)
TRIGL SERPL-MCNC: 367 MG/DL (ref 0–150)
VLDLC SERPL-MCNC: 58 MG/DL (ref 5–40)
WBC NRBC COR # BLD: 4.45 10*3/MM3 (ref 3.4–10.8)

## 2022-02-11 PROCEDURE — 99213 OFFICE O/P EST LOW 20 MIN: CPT | Performed by: FAMILY MEDICINE

## 2022-02-11 PROCEDURE — 36415 COLL VENOUS BLD VENIPUNCTURE: CPT

## 2022-02-11 PROCEDURE — 85025 COMPLETE CBC W/AUTO DIFF WBC: CPT

## 2022-02-11 PROCEDURE — 80061 LIPID PANEL: CPT

## 2022-02-11 PROCEDURE — 80053 COMPREHEN METABOLIC PANEL: CPT

## 2022-02-11 RX ORDER — FEXOFENADINE HCL AND PSEUDOEPHEDRINE HCI 180; 240 MG/1; MG/1
TABLET, EXTENDED RELEASE ORAL
COMMUNITY
Start: 2021-11-05 | End: 2022-02-11

## 2022-02-14 ENCOUNTER — TELEPHONE (OUTPATIENT)
Dept: FAMILY MEDICINE CLINIC | Facility: CLINIC | Age: 67
End: 2022-02-14

## 2022-02-14 ENCOUNTER — OFFICE VISIT (OUTPATIENT)
Dept: CARDIAC SURGERY | Facility: CLINIC | Age: 67
End: 2022-02-14

## 2022-02-14 VITALS
BODY MASS INDEX: 28.09 KG/M2 | WEIGHT: 179 LBS | HEIGHT: 67 IN | OXYGEN SATURATION: 96 % | SYSTOLIC BLOOD PRESSURE: 118 MMHG | HEART RATE: 76 BPM | DIASTOLIC BLOOD PRESSURE: 68 MMHG

## 2022-02-14 DIAGNOSIS — I82.90 CLOT: ICD-10-CM

## 2022-02-14 DIAGNOSIS — I26.99 BILATERAL PULMONARY EMBOLISM: Primary | ICD-10-CM

## 2022-02-14 DIAGNOSIS — Z71.89 ENCOUNTER FOR ANTICOAGULATION DISCUSSION AND COUNSELING: ICD-10-CM

## 2022-02-14 PROCEDURE — 99213 OFFICE O/P EST LOW 20 MIN: CPT | Performed by: NURSE PRACTITIONER

## 2022-02-14 RX ORDER — ASPIRIN 81 MG/1
81 TABLET, CHEWABLE ORAL DAILY
COMMUNITY

## 2022-02-14 NOTE — PROGRESS NOTES
CVTS Office Progress Note     2/14/2022    Ann Marie Malik  1955    Chief Complaint:    Chief Complaint   Patient presents with   • Pulmonary Embolism       HPI:      PCP:  Verito He MD     65 y.o. female with HTN(stable, increased risk stroke, rupture) and Hyperlipidemia(stable, increased risk cardiovascular events)  GERD, OA, prior CVA no residual. former smoker and quit 200.  Total R knee replacement earlier this week, developed some shortness of air x2 days at PT where O2 saturation was obtained in the low 80's.  Reported to emergency department and was found to have small bilateral PE's.   CTVS consulted for treatment recs.  Denies FH of VTE, 1st event provoked with recent surgery.  Returns today in follow up.  Now on room air  No other associated symptoms or modifying factors.     8/2021 CTA Chest: Small bilateral PE  8/2021 Bilateral venous: Negative for DVT  8/221 TTE: mild reduction in RV function, RVSP 46, EF 60%, mild TR  12/2021 TTE: EF 60%, grade I DD, RVSP 28, normal RV function  2/2022 CTA Chest: Negative for PE      The following portions of the patient's history were reviewed and updated as appropriate: allergies, current medications, past family history, past medical history, past social history, past surgical history and problem list.  Recent images independently reviewed.  Available laboratory values reviewed.    PMH:  Past Medical History:   Diagnosis Date   • Breast cyst    • GERD (gastroesophageal reflux disease)    • Hyperlipidemia    • Hypertension    • Osteoarthritis    • Pulmonary emboli (HCC)    • Stroke (HCC)      Past Surgical History:   Procedure Laterality Date   • BREAST CYST ASPIRATION     • TOTAL KNEE ARTHROPLASTY Right 8/23/2021     Family History   Problem Relation Age of Onset   • Hypertension Mother    • Hyperlipidemia Father    • Cancer Sister    • Breast cancer Sister    • Breast cancer Maternal Grandmother      Social History     Tobacco Use   •  Smoking status: Former Smoker     Packs/day: 1.00     Years: 25.00     Pack years: 25.00     Types: Cigarettes     Quit date:      Years since quittin.1   • Smokeless tobacco: Never Used   Vaping Use   • Vaping Use: Never used   Substance Use Topics   • Alcohol use: No   • Drug use: Never       ALLERGIES:  Allergies   Allergen Reactions   • Adhesive Tape Other (See Comments)     Pulls skin off   • Wound Dressing Adhesive Rash         MEDICATIONS:    Current Outpatient Medications:   •  acetaminophen (TYLENOL) 650 MG 8 hr tablet, Take 650 mg by mouth Every 8 (Eight) Hours As Needed for Mild Pain ., Disp: , Rfl:   •  albuterol sulfate  (90 Base) MCG/ACT inhaler, Inhale 2 puffs Every 6 (Six) Hours As Needed for Wheezing., Disp: 18 g, Rfl: 0  •  aspirin 81 MG chewable tablet, Chew 81 mg Daily., Disp: , Rfl:   •  atorvastatin (LIPITOR) 40 MG tablet, Take 1 tablet by mouth Every Night., Disp: 90 tablet, Rfl: 3  •  fexofenadine (ALLEGRA) 180 MG tablet, Take 180 mg by mouth Daily., Disp: , Rfl:   •  hydroCHLOROthiazide (HYDRODIURIL) 25 MG tablet, Take 25 mg by mouth Daily., Disp: , Rfl:   •  MILK THISTLE PO, Take 1 tablet by mouth 2 (Two) Times a Day., Disp: , Rfl:   •  omeprazole (priLOSEC) 20 MG capsule, Take 20 mg by mouth Daily., Disp: , Rfl:   •  valsartan (Diovan) 160 MG tablet, Take 1 tablet by mouth Daily., Disp: 90 tablet, Rfl: 3  •  VITAMIN E PO, Take 180 mg by mouth 2 (Two) Times a Day., Disp: , Rfl:       Review of Systems   Constitutional: Negative for chills, decreased appetite, fever and weight loss.   HENT: Negative for congestion, nosebleeds and sore throat.    Eyes: Negative for blurred vision, visual disturbance and visual halos.   Cardiovascular: Negative for chest pain, dyspnea on exertion and leg swelling.   Respiratory: Negative for cough, shortness of breath, sputum production and wheezing.    Endocrine: Negative for cold intolerance and polyuria.   Hematologic/Lymphatic: Negative for  "bleeding problem. Does not bruise/bleed easily.   Skin: Negative for flushing, nail changes and unusual hair distribution.   Musculoskeletal: Positive for arthritis and joint pain. Negative for back pain.   Gastrointestinal: Negative for bloating, abdominal pain, hematemesis, melena, nausea and vomiting.   Genitourinary: Negative for flank pain and hematuria.   Neurological: Negative for brief paralysis, difficulty with concentration, focal weakness, light-headedness, loss of balance, numbness, paresthesias and weakness.   Psychiatric/Behavioral: Negative for altered mental status, depression, substance abuse and suicidal ideas.   Allergic/Immunologic: Negative for hives and persistent infections.         Vitals:    02/14/22 1119   BP: 118/68   BP Location: Left arm   Patient Position: Sitting   Cuff Size: Adult   Pulse: 76   SpO2: 96%   Weight: 81.2 kg (179 lb)   Height: 170.2 cm (67\")     Physical Exam  Vitals and nursing note reviewed.   Constitutional:       Appearance: Normal appearance.   HENT:      Head: Normocephalic.      Nose: Nose normal.      Mouth/Throat:      Mouth: Mucous membranes are moist.   Eyes:      Pupils: Pupils are equal, round, and reactive to light.   Cardiovascular:      Rate and Rhythm: Normal rate.      Pulses: Normal pulses.   Pulmonary:      Effort: Pulmonary effort is normal.      Comments: Now on room air  Abdominal:      General: Abdomen is flat.      Palpations: Abdomen is soft.   Musculoskeletal:         General: Normal range of motion.      Cervical back: Normal range of motion.   Skin:     General: Skin is warm and dry.      Capillary Refill: Capillary refill takes 2 to 3 seconds.   Neurological:      General: No focal deficit present.      Mental Status: She is alert and oriented to person, place, and time.   Psychiatric:         Mood and Affect: Mood normal.         Assessment & Plan     Independent Review of Studies  2/2022 CTA Chest: Negative for PE    1. Bilateral pulmonary " embolism (CMS/HCC)  1st provoked event.  S/T to knee surgery.      Factor V, Factor II, homocysteine negative.  Completion of anti-phospholipid, lupus anticoagulant, antithrombin III, and C/S proteins once anticoagulant therapy has been completed.      CTA chest with resolution of PE.     Can discontinue anticoagulation, return 4 weeks for completion of hypercoagulable profile.  Will call with results.     2. Encounter for anticoagulation discussion and counseling  As above, would need long term anticoagulation should she have another event.     Detailed discussion regarding risks, benefits, and treatment plan. Images independently reviewed. Patient understands, agrees, and wishes to proceed with plan.       This document has been electronically signed by Eladio Salazar, AGACNP-BC @  On February 14, 2022 15:46 CST

## 2022-02-14 NOTE — TELEPHONE ENCOUNTER
Per Dr. He, Ms. Malik has been called with recent lab results and recommendations.   Please continue current medications and follow-up as planned

## 2022-02-14 NOTE — TELEPHONE ENCOUNTER
Please call patient and following labs:    -Lipid panel okay, triglycerides slightly elevated likely due to nonfasting state.  We will continue to monitor.  -CBC okay  -CMP okay.  There is a slight elevation of her enzymes which we will continue to monitor.    Thanks, KEVAN He

## 2022-03-08 ENCOUNTER — LAB (OUTPATIENT)
Dept: LAB | Facility: HOSPITAL | Age: 67
End: 2022-03-08

## 2022-03-08 DIAGNOSIS — I82.90 CLOT: ICD-10-CM

## 2022-03-08 DIAGNOSIS — I26.99 BILATERAL PULMONARY EMBOLISM: ICD-10-CM

## 2022-03-08 PROCEDURE — 86146 BETA-2 GLYCOPROTEIN ANTIBODY: CPT

## 2022-03-08 PROCEDURE — 36415 COLL VENOUS BLD VENIPUNCTURE: CPT

## 2022-03-08 PROCEDURE — 85305 CLOT INHIBIT PROT S TOTAL: CPT

## 2022-03-08 PROCEDURE — 85306 CLOT INHIBIT PROT S FREE: CPT

## 2022-03-08 PROCEDURE — 85613 RUSSELL VIPER VENOM DILUTED: CPT

## 2022-03-08 PROCEDURE — 85730 THROMBOPLASTIN TIME PARTIAL: CPT

## 2022-03-08 PROCEDURE — 86147 CARDIOLIPIN ANTIBODY EA IG: CPT

## 2022-03-08 PROCEDURE — 85300 ANTITHROMBIN III ACTIVITY: CPT

## 2022-03-08 PROCEDURE — 85302 CLOT INHIBIT PROT C ANTIGEN: CPT

## 2022-03-08 PROCEDURE — 85670 THROMBIN TIME PLASMA: CPT

## 2022-03-08 PROCEDURE — 85610 PROTHROMBIN TIME: CPT

## 2022-03-08 PROCEDURE — 85598 HEXAGNAL PHOSPH PLTLT NEUTRL: CPT

## 2022-03-08 PROCEDURE — 85303 CLOT INHIBIT PROT C ACTIVITY: CPT

## 2022-03-09 LAB
CARDIOLIPIN IGG SER IA-ACNC: <9 GPL U/ML (ref 0–14)
CARDIOLIPIN IGM SER IA-ACNC: <9 MPL U/ML (ref 0–12)

## 2022-03-10 LAB
APTT HEX PL PPP: 2 SEC (ref 0–11)
APTT PPP: 23.9 SEC (ref 22.9–30.2)
B2 GLYCOPROT1 IGG SER-ACNC: <9 GPI IGG UNITS (ref 0–20)
B2 GLYCOPROT1 IGM SER-ACNC: <9 GPI IGM UNITS (ref 0–32)
CARDIOLIPIN IGG SER IA-ACNC: <9 GPL U/ML (ref 0–14)
CARDIOLIPIN IGM SER IA-ACNC: 10 MPL U/ML (ref 0–12)
INR PPP: 1 (ref 0.9–1.2)
PATH INTERP BLD-IMP: NORMAL
PATH INTERP SPEC-IMP: NORMAL
PROTHROMBIN TIME: 10.3 SEC (ref 9.1–12)
SCREEN DRVVT: 35.5 SEC (ref 0–47)
THROMBIN TIME: 19.9 SEC (ref 0–23)

## 2022-03-11 LAB
AT III ACT/NOR PPP CHRO: 114 % (ref 75–135)
PROT C ACT/NOR PPP: 190 % (ref 73–180)
PROT S ACT/NOR PPP: 112 % (ref 63–140)

## 2022-03-12 LAB
PROT C AG ACT/NOR PPP IA: 159 % (ref 60–150)
PROT S AG ACT/NOR PPP IA: 142 % (ref 60–150)
PROT S FREE AG ACT/NOR PPP IA: 128 % (ref 61–136)

## 2022-03-14 RX ORDER — VALSARTAN 160 MG/1
160 TABLET ORAL DAILY
Qty: 90 TABLET | Refills: 3 | Status: SHIPPED | OUTPATIENT
Start: 2022-03-14 | End: 2023-02-10 | Stop reason: SDUPTHER

## 2022-03-14 NOTE — TELEPHONE ENCOUNTER
Incoming Refill Request      Medication requested (name and dose): valsartan (Diovan) 160 MG tablet    [facility] where [facility]    Additional details provided by patient: NONE    Best call back number: [phone]    Does the patient have less than a 3 day supply:  [] Yes  [x] No    Ranjit Keenan Rep  03/14/22, 10:30 CDT

## 2022-05-10 ENCOUNTER — TRANSCRIBE ORDERS (OUTPATIENT)
Dept: LAB | Facility: HOSPITAL | Age: 67
End: 2022-05-10

## 2022-05-10 ENCOUNTER — LAB (OUTPATIENT)
Dept: LAB | Facility: HOSPITAL | Age: 67
End: 2022-05-10

## 2022-05-10 DIAGNOSIS — K75.81 NONALCOHOLIC STEATOHEPATITIS: Primary | ICD-10-CM

## 2022-05-10 DIAGNOSIS — K75.81 NONALCOHOLIC STEATOHEPATITIS: ICD-10-CM

## 2022-05-10 LAB
ALBUMIN SERPL-MCNC: 4.6 G/DL (ref 3.5–5.2)
ALBUMIN/GLOB SERPL: 2.1 G/DL
ALP SERPL-CCNC: 145 U/L (ref 39–117)
ALT SERPL W P-5'-P-CCNC: 18 U/L (ref 1–33)
ANION GAP SERPL CALCULATED.3IONS-SCNC: 12 MMOL/L (ref 5–15)
AST SERPL-CCNC: 22 U/L (ref 1–32)
BILIRUB SERPL-MCNC: 0.3 MG/DL (ref 0–1.2)
BUN SERPL-MCNC: 20 MG/DL (ref 8–23)
BUN/CREAT SERPL: 24.1 (ref 7–25)
CALCIUM SPEC-SCNC: 9.9 MG/DL (ref 8.6–10.5)
CHLORIDE SERPL-SCNC: 102 MMOL/L (ref 98–107)
CO2 SERPL-SCNC: 26 MMOL/L (ref 22–29)
CREAT SERPL-MCNC: 0.83 MG/DL (ref 0.57–1)
EGFRCR SERPLBLD CKD-EPI 2021: 77.4 ML/MIN/1.73
GLOBULIN UR ELPH-MCNC: 2.2 GM/DL
GLUCOSE SERPL-MCNC: 116 MG/DL (ref 65–99)
POTASSIUM SERPL-SCNC: 3.2 MMOL/L (ref 3.5–5.2)
PROT SERPL-MCNC: 6.8 G/DL (ref 6–8.5)
SODIUM SERPL-SCNC: 140 MMOL/L (ref 136–145)

## 2022-05-10 PROCEDURE — 36415 COLL VENOUS BLD VENIPUNCTURE: CPT

## 2022-05-10 PROCEDURE — 80053 COMPREHEN METABOLIC PANEL: CPT

## 2022-05-18 ENCOUNTER — HOSPITAL ENCOUNTER (OUTPATIENT)
Dept: ULTRASOUND IMAGING | Facility: HOSPITAL | Age: 67
Discharge: HOME | End: 2022-05-18
Admitting: INTERNAL MEDICINE

## 2022-05-18 DIAGNOSIS — K75.81 NONALCOHOLIC STEATOHEPATITIS: ICD-10-CM

## 2022-05-18 PROCEDURE — 76705 ECHO EXAM OF ABDOMEN: CPT

## 2022-05-19 ENCOUNTER — LAB (OUTPATIENT)
Dept: LAB | Facility: HOSPITAL | Age: 67
End: 2022-05-19

## 2022-05-19 ENCOUNTER — TRANSCRIBE ORDERS (OUTPATIENT)
Dept: LAB | Facility: HOSPITAL | Age: 67
End: 2022-05-19

## 2022-05-19 DIAGNOSIS — K75.81 NONALCOHOLIC STEATOHEPATITIS (NASH): Primary | ICD-10-CM

## 2022-05-19 DIAGNOSIS — K75.81 NONALCOHOLIC STEATOHEPATITIS (NASH): ICD-10-CM

## 2022-05-19 LAB
ANION GAP SERPL CALCULATED.3IONS-SCNC: 10.9 MMOL/L (ref 5–15)
BUN SERPL-MCNC: 18 MG/DL (ref 8–23)
BUN/CREAT SERPL: 22 (ref 7–25)
CALCIUM SPEC-SCNC: 9.8 MG/DL (ref 8.6–10.5)
CHLORIDE SERPL-SCNC: 101 MMOL/L (ref 98–107)
CO2 SERPL-SCNC: 25.1 MMOL/L (ref 22–29)
CREAT SERPL-MCNC: 0.82 MG/DL (ref 0.57–1)
EGFRCR SERPLBLD CKD-EPI 2021: 78.5 ML/MIN/1.73
GLUCOSE SERPL-MCNC: 84 MG/DL (ref 65–99)
POTASSIUM SERPL-SCNC: 4.2 MMOL/L (ref 3.5–5.2)
SODIUM SERPL-SCNC: 137 MMOL/L (ref 136–145)

## 2022-05-19 PROCEDURE — 36415 COLL VENOUS BLD VENIPUNCTURE: CPT

## 2022-05-19 PROCEDURE — 80048 BASIC METABOLIC PNL TOTAL CA: CPT

## 2022-07-07 RX ORDER — OMEPRAZOLE 20 MG/1
CAPSULE, DELAYED RELEASE ORAL
Qty: 90 CAPSULE | Refills: 3 | Status: SHIPPED | OUTPATIENT
Start: 2022-07-07 | End: 2022-08-11

## 2022-08-11 ENCOUNTER — OFFICE VISIT (OUTPATIENT)
Dept: FAMILY MEDICINE CLINIC | Facility: CLINIC | Age: 67
End: 2022-08-11

## 2022-08-11 ENCOUNTER — LAB (OUTPATIENT)
Dept: LAB | Facility: HOSPITAL | Age: 67
End: 2022-08-11

## 2022-08-11 VITALS
HEIGHT: 67 IN | DIASTOLIC BLOOD PRESSURE: 80 MMHG | OXYGEN SATURATION: 96 % | HEART RATE: 74 BPM | WEIGHT: 183 LBS | BODY MASS INDEX: 28.72 KG/M2 | SYSTOLIC BLOOD PRESSURE: 124 MMHG

## 2022-08-11 DIAGNOSIS — I10 ESSENTIAL HYPERTENSION: ICD-10-CM

## 2022-08-11 DIAGNOSIS — E07.89 THYROID FULLNESS: ICD-10-CM

## 2022-08-11 DIAGNOSIS — M79.674 GREAT TOE PAIN, RIGHT: ICD-10-CM

## 2022-08-11 DIAGNOSIS — E78.2 MIXED HYPERLIPIDEMIA: ICD-10-CM

## 2022-08-11 DIAGNOSIS — Z00.00 MEDICARE ANNUAL WELLNESS VISIT, SUBSEQUENT: Primary | ICD-10-CM

## 2022-08-11 DIAGNOSIS — K21.9 GASTROESOPHAGEAL REFLUX DISEASE, UNSPECIFIED WHETHER ESOPHAGITIS PRESENT: ICD-10-CM

## 2022-08-11 DIAGNOSIS — Z12.31 ENCOUNTER FOR SCREENING MAMMOGRAM FOR MALIGNANT NEOPLASM OF BREAST: ICD-10-CM

## 2022-08-11 LAB
ALBUMIN SERPL-MCNC: 4.6 G/DL (ref 3.5–5.2)
ALBUMIN/GLOB SERPL: 2.6 G/DL
ALP SERPL-CCNC: 146 U/L (ref 39–117)
ALT SERPL W P-5'-P-CCNC: 16 U/L (ref 1–33)
ANION GAP SERPL CALCULATED.3IONS-SCNC: 11.6 MMOL/L (ref 5–15)
AST SERPL-CCNC: 15 U/L (ref 1–32)
BILIRUB SERPL-MCNC: 0.5 MG/DL (ref 0–1.2)
BUN SERPL-MCNC: 22 MG/DL (ref 8–23)
BUN/CREAT SERPL: 22.7 (ref 7–25)
CALCIUM SPEC-SCNC: 9.6 MG/DL (ref 8.6–10.5)
CHLORIDE SERPL-SCNC: 103 MMOL/L (ref 98–107)
CO2 SERPL-SCNC: 25.4 MMOL/L (ref 22–29)
CREAT SERPL-MCNC: 0.97 MG/DL (ref 0.57–1)
EGFRCR SERPLBLD CKD-EPI 2021: 64.2 ML/MIN/1.73
GLOBULIN UR ELPH-MCNC: 1.8 GM/DL
GLUCOSE SERPL-MCNC: 90 MG/DL (ref 65–99)
POTASSIUM SERPL-SCNC: 4.7 MMOL/L (ref 3.5–5.2)
PROT SERPL-MCNC: 6.4 G/DL (ref 6–8.5)
SODIUM SERPL-SCNC: 140 MMOL/L (ref 136–145)
T4 FREE SERPL-MCNC: 0.97 NG/DL (ref 0.93–1.7)
TSH SERPL DL<=0.05 MIU/L-ACNC: 1.53 UIU/ML (ref 0.27–4.2)
URATE SERPL-MCNC: 5.8 MG/DL (ref 2.4–5.7)

## 2022-08-11 PROCEDURE — 1159F MED LIST DOCD IN RCRD: CPT | Performed by: FAMILY MEDICINE

## 2022-08-11 PROCEDURE — 80053 COMPREHEN METABOLIC PANEL: CPT

## 2022-08-11 PROCEDURE — G0439 PPPS, SUBSEQ VISIT: HCPCS | Performed by: FAMILY MEDICINE

## 2022-08-11 PROCEDURE — 1170F FXNL STATUS ASSESSED: CPT | Performed by: FAMILY MEDICINE

## 2022-08-11 PROCEDURE — 84439 ASSAY OF FREE THYROXINE: CPT

## 2022-08-11 PROCEDURE — 84550 ASSAY OF BLOOD/URIC ACID: CPT

## 2022-08-11 PROCEDURE — 36415 COLL VENOUS BLD VENIPUNCTURE: CPT

## 2022-08-11 PROCEDURE — 1126F AMNT PAIN NOTED NONE PRSNT: CPT | Performed by: FAMILY MEDICINE

## 2022-08-11 PROCEDURE — 84443 ASSAY THYROID STIM HORMONE: CPT

## 2022-08-11 RX ORDER — HYDROCHLOROTHIAZIDE 25 MG/1
25 TABLET ORAL DAILY
Qty: 90 TABLET | Refills: 3 | Status: SHIPPED | OUTPATIENT
Start: 2022-08-11

## 2022-08-11 RX ORDER — OMEPRAZOLE 40 MG/1
40 CAPSULE, DELAYED RELEASE ORAL DAILY
Qty: 90 CAPSULE | Refills: 3 | Status: SHIPPED | OUTPATIENT
Start: 2022-08-11 | End: 2023-02-10 | Stop reason: SDUPTHER

## 2022-08-11 NOTE — PROGRESS NOTES
The ABCs of the Annual Wellness Visit  Subsequent Medicare Wellness Visit    Chief Complaint   Patient presents with   • Medicare Wellness-subsequent     Subjective    History of Present Illness:  Ann Marie Malik is a 66 y.o. female who presents for a Subsequent Medicare Wellness Visit.    The following portions of the patient's history were reviewed and   updated as appropriate: allergies, current medications, past family history, past medical history, past social history, past surgical history and problem list.    Compared to one year ago, the patient feels her physical   health is the same.    Compared to one year ago, the patient feels her mental   health is the same.    Recent Hospitalizations:  This patient has had a [facility] [place] admission record on file within the last 365 days.  Hospitalized following total knee arthroscopy and subsequent pulmonary embolus.     Current Medical Providers:  Patient Care Team:  Verito He MD as PCP - General (Family Medicine)  Christopher Whitney OD (Optometry)    Outpatient Medications Prior to Visit   Medication Sig Dispense Refill   • acetaminophen (TYLENOL) 650 MG 8 hr tablet Take 650 mg by mouth Every 8 (Eight) Hours As Needed for Mild Pain .     • albuterol sulfate  (90 Base) MCG/ACT inhaler Inhale 2 puffs Every 6 (Six) Hours As Needed for Wheezing. 18 g 0   • aspirin 81 MG chewable tablet Chew 81 mg Daily.     • atorvastatin (LIPITOR) 40 MG tablet Take 1 tablet by mouth Every Night. 90 tablet 3   • fexofenadine (ALLEGRA) 180 MG tablet Take 180 mg by mouth Daily.     • hydroCHLOROthiazide (HYDRODIURIL) 25 MG tablet Take 25 mg by mouth Daily.     • MILK THISTLE PO Take 1 tablet by mouth 2 (Two) Times a Day.     • omeprazole (priLOSEC) 20 MG capsule TAKE ONE CAPSULE BY MOUTH DAILY 90 capsule 3   • valsartan (Diovan) 160 MG tablet Take 1 tablet by mouth Daily. 90 tablet 3   • VITAMIN E PO Take 180 mg by mouth 2 (Two) Times a Day.       No  "facility-administered medications prior to visit.       No opioid medication identified on active medication list. I have reviewed chart for other potential  high risk medication/s and harmful drug interactions in the elderly.          Aspirin is on active medication list. Aspirin use is indicated based on review of current medical condition/s. Pros and cons of this therapy have been discussed today. Benefits of this medication outweigh potential harm.  Patient has been encouraged to continue taking this medication.        Patient Active Problem List   Diagnosis   • Cerebrovascular accident (CVA) (HCC)   • Bilateral pulmonary embolism (HCC)   • Hypoxia   • Encounter for anticoagulation discussion and counseling     Advance Care Planning  Advance Directive is not on file.  ACP discussion was held with the patient during this visit. Patient does not have an advance directive, information provided.          Objective    Vitals:    08/11/22 1003   BP: 124/80   Pulse: 74   SpO2: 96%   Weight: 83 kg (183 lb)   Height: 170.2 cm (67\")   PainSc: 0-No pain     Estimated body mass index is 28.66 kg/m² as calculated from the following:    Height as of this encounter: 170.2 cm (67\").    Weight as of this encounter: 83 kg (183 lb).    BMI is >= 25 and <30. (Overweight) The following options were offered after discussion;: exercise counseling/recommendations and nutrition counseling/recommendations      Does the patient have evidence of cognitive impairment? No    Physical Exam  Vitals reviewed.   Constitutional:       General: She is not in acute distress.     Appearance: She is well-developed.   Neck:      Comments: Slight thyroid fullness appreciated today  Cardiovascular:      Rate and Rhythm: Normal rate and regular rhythm.      Heart sounds: Normal heart sounds. No murmur heard.  Pulmonary:      Effort: Pulmonary effort is normal. No respiratory distress.      Breath sounds: Normal breath sounds. No wheezing or rales. "   Abdominal:      Palpations: Abdomen is soft.      Tenderness: There is no abdominal tenderness.   Musculoskeletal:      Cervical back: Neck supple.      Comments: Prominence without erythema or tenderness of the first metatarsophalangeal joint on right foot.   Skin:     General: Skin is warm and dry.   Neurological:      Mental Status: She is alert and oriented to person, place, and time.                 HEALTH RISK ASSESSMENT    Smoking Status:  Social History     Tobacco Use   Smoking Status Former Smoker   • Packs/day: 1.00   • Years: 25.00   • Pack years: 25.00   • Types: Cigarettes   • Quit date:    • Years since quittin.6   Smokeless Tobacco Never Used     Alcohol Consumption:  Social History     Substance and Sexual Activity   Alcohol Use No     Fall Risk Screen:    JUANITOADI Fall Risk Assessment was completed, and patient is at LOW risk for falls.Assessment completed on:2022    Depression Screening:  PHQ-2/PHQ-9 Depression Screening 2022   Retired Total Score -   Little Interest or Pleasure in Doing Things 0-->not at all   Feeling Down, Depressed or Hopeless 0-->not at all   PHQ-9: Brief Depression Severity Measure Score 0       Health Habits and Functional and Cognitive Screening:  Functional & Cognitive Status 2022   Do you have difficulty preparing food and eating? No   Do you have difficulty bathing yourself, getting dressed or grooming yourself? No   Do you have difficulty using the toilet? No   Do you have difficulty moving around from place to place? No   Do you have trouble with steps or getting out of a bed or a chair? No   Current Diet Well Balanced Diet   Dental Exam Up to date   Eye Exam Up to date   Exercise (times per week) 3 times per week   Current Exercises Include Stationary Bicycling/Spin Class   Do you need help using the phone?  No   Are you deaf or do you have serious difficulty hearing?  No   Do you need help with transportation? No   Do you need help shopping?  No   Do you need help preparing meals?  No   Do you need help with housework?  No   Do you need help with laundry? No   Do you need help taking your medications? No   Do you need help managing money? No   Do you ever drive or ride in a car without wearing a seat belt? No   Have you felt unusual stress, anger or loneliness in the last month? No   Who do you live with? Spouse   If you need help, do you have trouble finding someone available to you? No   Have you been bothered in the last four weeks by sexual problems? No   Do you have difficulty concentrating, remembering or making decisions? No       Age-appropriate Screening Schedule:  Refer to the list below for future screening recommendations based on patient's age, sex and/or medical conditions. Orders for these recommended tests are listed in the plan section. The patient has been provided with a written plan.    Health Maintenance   Topic Date Due   • TDAP/TD VACCINES (1 - Tdap) Never done   • INFLUENZA VACCINE  10/01/2022   • MAMMOGRAM  11/30/2022   • LIPID PANEL  02/11/2023   • DXA SCAN  11/02/2023   • ZOSTER VACCINE  Completed              Assessment & Plan   CMS Preventative Services Quick Reference  Risk Factors Identified During Encounter  Cardiovascular Disease  Obesity/Overweight   The above risks/problems have been discussed with the patient.  Follow up actions/plans if indicated are seen below in the Assessment/Plan Section.  Pertinent information has been shared with the patient in the After Visit Summary.    Diagnoses and all orders for this visit:    1. Medicare annual wellness visit, subsequent (Primary)    2. Essential hypertension  -     hydroCHLOROthiazide (HYDRODIURIL) 25 MG tablet; Take 1 tablet by mouth Daily.  Dispense: 90 tablet; Refill: 3  -     Comprehensive Metabolic Panel; Future    3. Mixed hyperlipidemia    4. Gastroesophageal reflux disease, unspecified whether esophagitis present  -     omeprazole (priLOSEC) 40 MG capsule; Take 1  capsule by mouth Daily.  Dispense: 90 capsule; Refill: 3    5. Encounter for screening mammogram for malignant neoplasm of breast  -     Mammo Screening Digital Tomosynthesis Bilateral With CAD; Future    6. Thyroid fullness  -     TSH; Future  -     T4, free; Future    7. Great toe pain, right  -     XR Foot 3+ View Right; Future  -     Uric acid; Future      Medicare wellness visit complete  Chronic medical problems controlled  Body mass index is 28.66 kg/m².  BMI is >= 25 and <30. (Overweight) The following options were offered after discussion;: exercise counseling/recommendations and nutrition counseling/recommendations  Due for mammogram in a few months - ordered  TSH and free T4 for appreciated thyroid fullness on exam  Xray and uric acid levels for right great toe pain, will call with results      Follow Up:   Return in about 6 months (around 2/11/2023) for Recheck.     An After Visit Summary and PPPS were made available to the patient.                       This document has been electronically signed by Verito He MD

## 2022-08-12 ENCOUNTER — TELEPHONE (OUTPATIENT)
Dept: FAMILY MEDICINE CLINIC | Facility: CLINIC | Age: 67
End: 2022-08-12

## 2022-08-12 NOTE — TELEPHONE ENCOUNTER
Per Dr. He, Ms. Malik has been called with recent lab results & recommendations.  Continue current medications and follow-up as planned or sooner if any problems.     ----- Message from Verito He MD sent at 8/12/2022 12:14 AM CDT -----  Thyroid studies ok.  CMP ok.  Uric acid is just slightly above the normal range, however I still think gout is less likely based on presentation.  Awaiting Xray, will call once available.  Thanks, KEVAN He

## 2022-08-12 NOTE — PROGRESS NOTES
Per Dr. He, Ms. Malik has been called with recent lab results & recommendations.  Continue current medications and follow-up as planned or sooner if any problems.

## 2022-08-15 ENCOUNTER — TELEPHONE (OUTPATIENT)
Dept: FAMILY MEDICINE CLINIC | Facility: CLINIC | Age: 67
End: 2022-08-15

## 2022-08-15 NOTE — TELEPHONE ENCOUNTER
Per Dr. He, Ms. Malik  has been called with recent Foot x-ray results & recommendations.  Continue current medications and follow-up as planned or sooner if any problems.     Yes, Please send referral to Podiatry.        ----- Message from Verito He MD sent at 8/15/2022 12:39 PM CDT -----  Xray of the foot shows severe degenerative changes (arthritis) of the joint that is causing her pain.  If pain is problematic/interfering with activities, then I would recommend that she sees podiatry.  Please ask if she would like this referral.  They can sometimes do steroid injections to help with pain.  Thanks, KEVAN He

## 2022-08-17 DIAGNOSIS — M19.071 ARTHRITIS OF FIRST METATARSOPHALANGEAL (MTP) JOINT OF RIGHT FOOT: Primary | ICD-10-CM

## 2022-08-19 ENCOUNTER — TELEPHONE (OUTPATIENT)
Dept: PODIATRY | Facility: CLINIC | Age: 67
End: 2022-08-19

## 2022-08-24 DIAGNOSIS — Z96.651 PRESENCE OF TOTAL KNEE JOINT PROSTHESIS, RIGHT: Primary | ICD-10-CM

## 2022-08-25 ENCOUNTER — OFFICE VISIT (OUTPATIENT)
Dept: ORTHOPEDIC SURGERY | Facility: CLINIC | Age: 67
End: 2022-08-25

## 2022-08-25 VITALS — BODY MASS INDEX: 28.72 KG/M2 | WEIGHT: 183 LBS | HEIGHT: 67 IN

## 2022-08-25 DIAGNOSIS — M17.11 PRIMARY OSTEOARTHRITIS OF RIGHT KNEE: Primary | ICD-10-CM

## 2022-08-25 DIAGNOSIS — Z96.651 PRESENCE OF TOTAL KNEE JOINT PROSTHESIS, RIGHT: Primary | ICD-10-CM

## 2022-08-25 PROCEDURE — 99213 OFFICE O/P EST LOW 20 MIN: CPT | Performed by: NURSE PRACTITIONER

## 2022-08-25 NOTE — PROGRESS NOTES
"Ann Marie Malik is a 66 y.o. female returns for     Chief Complaint   Patient presents with   • Right Knee - Follow-up     1 year follow up Right TKA     HISTORY OF PRESENT ILLNESS: Patient presents to office for 1 year follow-up post right total knee arthroplasty.  Date of surgery was 8/23/2021.  The patient has done well postoperatively with no known complications.  She is very pleased with her outcome.  She denies any pain or issues with her right knee joint.  Patient denies any limitations with activity.  No new complaints or concerns noted since last office visit.  Repeat x-rays are performed in office today.  Pain scale currently 0/10.     CONCURRENT MEDICAL HISTORY:    The following portions of the patient's history were reviewed and updated as appropriate: allergies, current medications, past family history, past medical history, past social history, past surgical history and problem list.     ROS  No fevers or chills.  No chest pain or shortness of air.  No GI or  disturbances.    PHYSICAL EXAMINATION:       Ht 170.2 cm (67\")   Wt 83 kg (183 lb)   BMI 28.66 kg/m²     Physical Exam  Vitals reviewed.   Constitutional:       General: She is not in acute distress.     Appearance: She is well-developed. She is not ill-appearing.   HENT:      Head: Normocephalic.   Pulmonary:      Effort: Pulmonary effort is normal. No respiratory distress.   Abdominal:      General: There is no distension.      Palpations: Abdomen is soft.   Musculoskeletal:         General: No swelling or tenderness.      Right knee: No effusion.   Skin:     General: Skin is warm and dry.      Capillary Refill: Capillary refill takes less than 2 seconds.      Findings: No erythema.   Neurological:      Mental Status: She is alert and oriented to person, place, and time.      GCS: GCS eye subscore is 4. GCS verbal subscore is 5. GCS motor subscore is 6.   Psychiatric:         Speech: Speech normal.         Behavior: Behavior normal.    "      Thought Content: Thought content normal.         Judgment: Judgment normal.         GAIT:     [x]  Normal  []  Antalgic    Assistive device: [x]  None  []  Walker     []  Crutches  []  Cane     []  Wheelchair  []  Stretcher    Right Knee Exam     Tenderness   The patient is experiencing no tenderness.     Range of Motion   Extension: 0   Right knee flexion: 125.     Tests   Varus: negative Valgus: negative    Other   Erythema: absent  Sensation: normal  Pulse: present  Swelling: none  Effusion: no effusion present    Comments:  Good motion of the knee joint without pain. Good stability.  No swelling appreciated.  No erythema.  No signs of infection noted.  Surgical incision is well-healed and well approximated.            XR Knee 1 or 2 View Right    Result Date: 8/30/2022  Narrative: Two view standing AP knees Standing right lateral knee HISTORY: Pain. Right knee prosthesis. AP standing views of each knee and lateral standing views of the right knee obtained. COMPARISON: December 3, 2021 FINDINGS: No fracture or dislocation demonstrated on the images obtained. Minimal narrowing left medial joint space. Stable right total knee prosthesis. Small right suprapatellar effusion. No other osseous or articular abnormality.     Impression: CONCLUSION: Minimal narrowing left medial joint space. Stable right total knee prosthesis. Small right suprapatellar effusion. [id number] Electronically signed by:  Srinivas Butts MD  8/30/2022 10:03 AM CDT Workstation: [id number]    XR Knee Bilateral AP Standing    Result Date: 8/30/2022  Narrative: Two view standing AP knees Standing right lateral knee HISTORY: Pain. Right knee prosthesis. AP standing views of each knee and lateral standing views of the right knee obtained. COMPARISON: December 3, 2021 FINDINGS: No fracture or dislocation demonstrated on the images obtained. Minimal narrowing left medial joint space. Stable right total knee prosthesis. Small right suprapatellar effusion. No  "other osseous or articular abnormality.     Impression: CONCLUSION: Minimal narrowing left medial joint space. Stable right total knee prosthesis. Small right suprapatellar effusion. [id number] Electronically signed by:  Srinivas Butts MD  8/30/2022 10:04 AM CDT Workstation: [id number]      ASSESSMENT:    Diagnoses and all orders for this visit:    Presence of total knee joint prosthesis, right    PLAN    AP standing x-ray of the bilateral knees with a lateral x-ray of the right knee performed in office today and reviewed with no acute findings or changes noted.  The right knee implant appears stable and unchanged.  The patient is doing well and is now 1 year post right total knee arthroplasty.  The patient has had no known complications with her right knee.  She denies any pain or issues.  She denies any mobility limitations.  Patient reports she occasionally has some \"stiffness\" but she describes this is mild and overall she has excellent range of motion of her right knee joint.  I have encouraged her to continue with home exercises as needed for maintaining her range of motion and for conditioning/strengthening.  Otherwise, she can continue with activity as tolerated.  Patient is encouraged to follow-up with orthopedics for any concerns or issues.    Follow-up as needed for any new, worsening or persistent symptoms.    Time spent of a minimum of 20 minutes including the face to face evaluation, reviewing of medical history and prior medial records, reviewing of diagnostic studies, documentation, patient education and coordination of care.     EMR Dragon/Transciption Disclaimer: Some of this note may be an electronic transcription/translation of spoken language to printed text using the Dragon Dictation System.     Return if symptoms worsen or fail to improve.        This document has been electronically signed by LUIS Barrera on August 30, 2022 11:07 CDT      LUIS Barrera    "

## 2022-09-07 ENCOUNTER — OFFICE VISIT (OUTPATIENT)
Dept: PODIATRY | Facility: CLINIC | Age: 67
End: 2022-09-07

## 2022-09-07 VITALS — BODY MASS INDEX: 28.72 KG/M2 | WEIGHT: 183 LBS | HEIGHT: 67 IN | HEART RATE: 84 BPM | OXYGEN SATURATION: 97 %

## 2022-09-07 DIAGNOSIS — M79.671 RIGHT FOOT PAIN: Primary | ICD-10-CM

## 2022-09-07 DIAGNOSIS — M20.21 HALLUX RIGIDUS OF RIGHT FOOT: ICD-10-CM

## 2022-09-07 PROCEDURE — 99204 OFFICE O/P NEW MOD 45 MIN: CPT | Performed by: PODIATRIST

## 2022-09-07 NOTE — PROGRESS NOTES
Ann Marie Malik  1955  66 y.o. female      2022    Chief Complaint   Patient presents with   • Right Foot - Pain       History of Present Illness    Ann Marie Malik is a 66 y.o.female presents to clinic today with right great toe pain.       Past Medical History:   Diagnosis Date   • Breast cyst    • GERD (gastroesophageal reflux disease)    • Hyperlipidemia    • Hypertension    • Osteoarthritis    • Pulmonary emboli (HCC)    • Stroke (HCC)          Past Surgical History:   Procedure Laterality Date   • BREAST CYST ASPIRATION     • TOTAL KNEE ARTHROPLASTY Right 2021         Family History   Problem Relation Age of Onset   • Hypertension Mother    • Hyperlipidemia Father    • Cancer Sister    • Breast cancer Sister    • Breast cancer Maternal Grandmother        Allergies   Allergen Reactions   • Adhesive Tape Other (See Comments)     Pulls skin off   • Wound Dressing Adhesive Rash       Social History     Socioeconomic History   • Marital status:    Tobacco Use   • Smoking status: Former Smoker     Packs/day: 1.00     Years: 25.00     Pack years: 25.00     Types: Cigarettes     Quit date:      Years since quittin.7   • Smokeless tobacco: Never Used   Vaping Use   • Vaping Use: Never used   Substance and Sexual Activity   • Alcohol use: No   • Drug use: Never   • Sexual activity: Not Currently         Current Outpatient Medications   Medication Sig Dispense Refill   • acetaminophen (TYLENOL) 650 MG 8 hr tablet Take 650 mg by mouth Every 8 (Eight) Hours As Needed for Mild Pain .     • albuterol sulfate  (90 Base) MCG/ACT inhaler Inhale 2 puffs Every 6 (Six) Hours As Needed for Wheezing. 18 g 0   • aspirin 81 MG chewable tablet Chew 81 mg Daily.     • atorvastatin (LIPITOR) 40 MG tablet Take 1 tablet by mouth Every Night. 90 tablet 3   • fexofenadine (ALLEGRA) 180 MG tablet Take 180 mg by mouth Daily.     • hydroCHLOROthiazide (HYDRODIURIL) 25 MG tablet Take 1 tablet by  "mouth Daily. 90 tablet 3   • MILK THISTLE PO Take 1 tablet by mouth 2 (Two) Times a Day.     • omeprazole (priLOSEC) 40 MG capsule Take 1 capsule by mouth Daily. 90 capsule 3   • valsartan (Diovan) 160 MG tablet Take 1 tablet by mouth Daily. 90 tablet 3   • VITAMIN E PO Take 180 mg by mouth 2 (Two) Times a Day.       No current facility-administered medications for this visit.       Review of Systems   Constitutional: Negative.    HENT: Negative.    Respiratory: Negative.    Cardiovascular: Negative.    Gastrointestinal: Negative.    Endocrine: Negative.    Genitourinary: Negative.    Musculoskeletal:        Right great toe pain      Skin: Negative.    Allergic/Immunologic: Negative.    Neurological: Negative.    Hematological: Negative.    Psychiatric/Behavioral: Negative.          OBJECTIVE    Pulse 84   Ht 170.2 cm (67\")   Wt 83 kg (183 lb)   SpO2 97%   BMI 28.66 kg/m²     Physical Exam  Vitals reviewed.   Constitutional:       General: She is not in acute distress.     Appearance: She is well-developed.   HENT:      Head: Normocephalic and atraumatic.      Nose: Nose normal.   Eyes:      Conjunctiva/sclera: Conjunctivae normal.      Pupils: Pupils are equal, round, and reactive to light.   Cardiovascular:      Pulses:           Dorsalis pedis pulses are 2+ on the right side.        Posterior tibial pulses are 2+ on the right side.   Pulmonary:      Effort: Pulmonary effort is normal. No respiratory distress.      Breath sounds: No wheezing.   Musculoskeletal:      Right foot: Decreased range of motion. Deformity and prominent metatarsal heads present.        Feet:    Feet:      Right foot:      Protective Sensation: 10 sites tested. 10 sites sensed.      Skin integrity: Skin integrity normal.   Skin:     General: Skin is warm and dry.      Capillary Refill: Capillary refill takes less than 2 seconds.   Neurological:      Mental Status: She is alert and oriented to person, place, and time.   Psychiatric:    "      Behavior: Behavior normal.         Thought Content: Thought content normal.                Procedures        ASSESSMENT AND PLAN    Diagnoses and all orders for this visit:    1. Right foot pain (Primary)  -     XR Foot 3+ View Right    2. Hallux rigidus of right foot  -     Case Request; Standing  -     Case Request    Other orders  -     Follow Anesthesia Guidelines / Standing Orders; Future        -Patient examined and evaluated  -Radiographs taken and reviewed.  Significant degenerative changes noted to the first MTP  -Lengthy discussion held patient regarding conservative versus surgical options.  Patient has elected for surgical correction.   -Surgical plan is right first metatarsophalangeal joint arthrodesis with calcaneal autograft and all indicated procedures.  -Risks and benefits of the procedure including but not limited to postoperative infection, incisional dehiscence, numbness, swelling, residual pain and postoperative blood clot discussed in detail.  No guarantees were given or implied.  -Tentative date for the surgery November 28, 2022  -All questions were answered  -Recheck following surgery             This document has been electronically signed by Av Jean DPM on September 8, 2022 17:01 CDT     9/8/2022  17:01 CDT     no

## 2022-09-12 PROBLEM — M20.21 HALLUX RIGIDUS OF RIGHT FOOT: Status: ACTIVE | Noted: 2022-09-12

## 2022-09-27 DIAGNOSIS — E78.2 MIXED HYPERLIPIDEMIA: ICD-10-CM

## 2022-09-27 RX ORDER — ATORVASTATIN CALCIUM 40 MG/1
40 TABLET, FILM COATED ORAL NIGHTLY
Qty: 90 TABLET | Refills: 3 | Status: SHIPPED | OUTPATIENT
Start: 2022-09-27 | End: 2023-01-03 | Stop reason: SDUPTHER

## 2022-09-27 NOTE — TELEPHONE ENCOUNTER
Incoming Refill Request      Medication requested (name and dose): ATORVASTATIN 40 MG    Pharmacy where request should be sent: [facility]    Additional details provided by patient: [phone]    Best call back number:      Does the patient have less than a 3 day supply:  [] Yes  [x] No    Sully Gaines  09/27/22, 11:27 CDT

## 2022-11-02 ENCOUNTER — TELEPHONE (OUTPATIENT)
Dept: PODIATRY | Facility: CLINIC | Age: 67
End: 2022-11-02

## 2022-11-02 NOTE — TELEPHONE ENCOUNTER
PT WANTS TO CANCEL SURGERY SCHEDULED FOR 11-28-22 AND WILL RESCHEDULE AT A LATER DATE.  CALL BACK # [phone].  THANK YOU.

## 2022-12-14 ENCOUNTER — OFFICE VISIT (OUTPATIENT)
Dept: PODIATRY | Facility: CLINIC | Age: 67
End: 2022-12-14

## 2022-12-14 VITALS — HEART RATE: 74 BPM | WEIGHT: 183 LBS | OXYGEN SATURATION: 96 % | BODY MASS INDEX: 28.72 KG/M2 | HEIGHT: 67 IN

## 2022-12-14 DIAGNOSIS — M20.21 HALLUX RIGIDUS OF RIGHT FOOT: Primary | ICD-10-CM

## 2022-12-14 DIAGNOSIS — M79.671 RIGHT FOOT PAIN: ICD-10-CM

## 2022-12-14 PROCEDURE — 99213 OFFICE O/P EST LOW 20 MIN: CPT | Performed by: NURSE PRACTITIONER

## 2022-12-14 PROCEDURE — 20600 DRAIN/INJ JOINT/BURSA W/O US: CPT | Performed by: NURSE PRACTITIONER

## 2022-12-14 RX ORDER — TRIAMCINOLONE ACETONIDE 40 MG/ML
10 INJECTION, SUSPENSION INTRA-ARTICULAR; INTRAMUSCULAR ONCE
Status: COMPLETED | OUTPATIENT
Start: 2022-12-14 | End: 2022-12-14

## 2022-12-14 RX ORDER — DEXAMETHASONE SODIUM PHOSPHATE 4 MG/ML
2 INJECTION, SOLUTION INTRA-ARTICULAR; INTRALESIONAL; INTRAMUSCULAR; INTRAVENOUS; SOFT TISSUE ONCE
Status: COMPLETED | OUTPATIENT
Start: 2022-12-14 | End: 2022-12-14

## 2022-12-14 RX ADMIN — DEXAMETHASONE SODIUM PHOSPHATE 2 MG: 4 INJECTION, SOLUTION INTRA-ARTICULAR; INTRALESIONAL; INTRAMUSCULAR; INTRAVENOUS; SOFT TISSUE at 17:01

## 2022-12-14 RX ADMIN — TRIAMCINOLONE ACETONIDE 10 MG: 40 INJECTION, SUSPENSION INTRA-ARTICULAR; INTRAMUSCULAR at 17:01

## 2022-12-14 NOTE — PROGRESS NOTES
Ann Marie Malik  1955  67 y.o. female      2022    Chief Complaint   Patient presents with   • Right Foot - Pain       History of Present Illness    Ann Marie Malik is a 67 y.o.female came to clinic for concern right foot pain. Patient states she does not want to have surgery at the time which was discussed with Dr. Jean on 2022. Patient would like to have steroid injection. Patient had a xray on 2022.       Past Medical History:   Diagnosis Date   • Breast cyst    • GERD (gastroesophageal reflux disease)    • Hyperlipidemia    • Hypertension    • Osteoarthritis    • Pulmonary emboli (HCC)    • Stroke (HCC)          Past Surgical History:   Procedure Laterality Date   • BREAST CYST ASPIRATION     • TOTAL KNEE ARTHROPLASTY Right 2021         Family History   Problem Relation Age of Onset   • Hypertension Mother    • Hyperlipidemia Father    • Cancer Sister    • Breast cancer Sister    • Breast cancer Maternal Grandmother        Allergies   Allergen Reactions   • Adhesive Tape Other (See Comments)     Pulls skin off   • Wound Dressing Adhesive Rash       Social History     Socioeconomic History   • Marital status:    Tobacco Use   • Smoking status: Former     Packs/day: 1.00     Years: 25.00     Pack years: 25.00     Types: Cigarettes     Quit date:      Years since quittin.9   • Smokeless tobacco: Never   Vaping Use   • Vaping Use: Never used   Substance and Sexual Activity   • Alcohol use: No   • Drug use: Never   • Sexual activity: Not Currently         Current Outpatient Medications   Medication Sig Dispense Refill   • acetaminophen (TYLENOL) 650 MG 8 hr tablet Take 650 mg by mouth Every 8 (Eight) Hours As Needed for Mild Pain .     • albuterol sulfate  (90 Base) MCG/ACT inhaler Inhale 2 puffs Every 6 (Six) Hours As Needed for Wheezing. 18 g 0   • aspirin 81 MG chewable tablet Chew 81 mg Daily.     • atorvastatin (LIPITOR) 40 MG tablet Take 1 tablet by  "mouth Every Night. 90 tablet 3   • fexofenadine (ALLEGRA) 180 MG tablet Take 180 mg by mouth Daily.     • hydroCHLOROthiazide (HYDRODIURIL) 25 MG tablet Take 1 tablet by mouth Daily. 90 tablet 3   • MILK THISTLE PO Take 1 tablet by mouth 2 (Two) Times a Day.     • omeprazole (priLOSEC) 40 MG capsule Take 1 capsule by mouth Daily. 90 capsule 3   • valsartan (Diovan) 160 MG tablet Take 1 tablet by mouth Daily. 90 tablet 3   • VITAMIN E PO Take 180 mg by mouth 2 (Two) Times a Day.       Current Facility-Administered Medications   Medication Dose Route Frequency Provider Last Rate Last Admin   • dexamethasone (DECADRON) injection 2 mg  2 mg Intravenous Once Boris Hernandez APRN       • triamcinolone acetonide (KENALOG-40) injection 10 mg  10 mg Intramuscular Once Boris Hernandez APRN           Review of Systems   Constitutional: Negative.    HENT: Negative.    Eyes: Negative.    Respiratory: Negative.    Cardiovascular: Negative.    Gastrointestinal: Negative.    Endocrine: Negative.    Genitourinary: Negative.    Musculoskeletal:        Foot pain    Skin: Negative.    Allergic/Immunologic: Negative.    Neurological: Negative.    Hematological: Negative.    Psychiatric/Behavioral: Negative.          OBJECTIVE    Pulse 74   Ht 170.2 cm (67\")   Wt 83 kg (183 lb)   SpO2 96%   BMI 28.66 kg/m²     Physical Exam  Vitals reviewed.   Constitutional:       Appearance: Normal appearance. She is well-developed.   HENT:      Head: Normocephalic and atraumatic.   Neck:      Trachea: Trachea and phonation normal.   Cardiovascular:      Pulses:           Dorsalis pedis pulses are 2+ on the right side and 2+ on the left side.        Posterior tibial pulses are 2+ on the right side and 2+ on the left side.   Pulmonary:      Effort: Pulmonary effort is normal. No respiratory distress.   Abdominal:      General: There is no distension.      Palpations: Abdomen is soft.   Musculoskeletal:        Feet:    Feet:      Right foot:    "   Skin integrity: Skin integrity normal.      Toenail Condition: Right toenails are normal.      Left foot:      Skin integrity: Skin integrity normal.      Toenail Condition: Left toenails are normal.   Skin:     General: Skin is warm and dry.   Neurological:      Mental Status: She is alert and oriented to person, place, and time.      GCS: GCS eye subscore is 4. GCS verbal subscore is 5. GCS motor subscore is 6.   Psychiatric:         Speech: Speech normal.         Behavior: Behavior normal. Behavior is cooperative.         Thought Content: Thought content normal.         Judgment: Judgment normal.                Procedures  After risk benefits and treatment options were discussed with patient in detail we proceeded with an intra-articular injection into the right first metatarsophalangeal joint of the great toe using a 27-gauge needle after being cleaned with alcohol in a sterile fashion.  Injected was 1/2 cc of Kenalog mixed with 1/2 cc of dexamethasone and 1/2 cc of lidocaine without epi.  Patient tolerated procedure fairly well she left ambulatory without difficulty.      ASSESSMENT AND PLAN    Diagnoses and all orders for this visit:    1. Hallux rigidus of right foot (Primary)  -     dexamethasone (DECADRON) injection 2 mg  -     triamcinolone acetonide (KENALOG-40) injection 10 mg    2. Right foot pain  -     dexamethasone (DECADRON) injection 2 mg  -     triamcinolone acetonide (KENALOG-40) injection 10 mg        Reviewed previous x-rays on the patient obtained in 2022.  Reviewed previous notes of Dr. Jean's.    Patient left ambulatory and will follow-up in 2 weeks for recheck unless symptoms worsen.  She verbalized understand the plan of care and at this point is still not ready to proceed with surgical options.  An injection was performed which she tolerated well.  We discussed inserts further shoes with her as well.        This document has been electronically signed by YOSELIN Burnett-CHACE,  ONP-C on December 14, 2022 16:04 CST

## 2023-01-03 DIAGNOSIS — E78.2 MIXED HYPERLIPIDEMIA: ICD-10-CM

## 2023-01-03 RX ORDER — ATORVASTATIN CALCIUM 40 MG/1
40 TABLET, FILM COATED ORAL NIGHTLY
Qty: 90 TABLET | Refills: 3 | Status: SHIPPED | OUTPATIENT
Start: 2023-01-03

## 2023-02-10 ENCOUNTER — OFFICE VISIT (OUTPATIENT)
Dept: FAMILY MEDICINE CLINIC | Facility: CLINIC | Age: 68
End: 2023-02-10
Payer: MEDICARE

## 2023-02-10 VITALS
DIASTOLIC BLOOD PRESSURE: 80 MMHG | BODY MASS INDEX: 28.41 KG/M2 | WEIGHT: 181 LBS | HEIGHT: 67 IN | HEART RATE: 82 BPM | SYSTOLIC BLOOD PRESSURE: 114 MMHG | OXYGEN SATURATION: 97 %

## 2023-02-10 DIAGNOSIS — Z12.11 SCREENING FOR MALIGNANT NEOPLASM OF COLON: ICD-10-CM

## 2023-02-10 DIAGNOSIS — R10.9 RIGHT FLANK PAIN: ICD-10-CM

## 2023-02-10 DIAGNOSIS — E78.2 MIXED HYPERLIPIDEMIA: ICD-10-CM

## 2023-02-10 DIAGNOSIS — K21.9 GASTROESOPHAGEAL REFLUX DISEASE, UNSPECIFIED WHETHER ESOPHAGITIS PRESENT: ICD-10-CM

## 2023-02-10 DIAGNOSIS — I10 ESSENTIAL HYPERTENSION: Primary | ICD-10-CM

## 2023-02-10 PROCEDURE — 99214 OFFICE O/P EST MOD 30 MIN: CPT | Performed by: FAMILY MEDICINE

## 2023-02-10 RX ORDER — OMEPRAZOLE 40 MG/1
40 CAPSULE, DELAYED RELEASE ORAL DAILY
Qty: 90 CAPSULE | Refills: 3 | Status: SHIPPED | OUTPATIENT
Start: 2023-02-10

## 2023-02-10 RX ORDER — VALSARTAN 160 MG/1
160 TABLET ORAL DAILY
Qty: 90 TABLET | Refills: 3 | Status: SHIPPED | OUTPATIENT
Start: 2023-02-10

## 2023-02-10 NOTE — PROGRESS NOTES
"Chief Complaint  Hypertension    Subjective    History of Present Illness {CC  Problem List  Visit  Diagnosis   Encounters  Notes  Medications  Labs  Result Review Imaging  Media :23}     Ann Marie Malik presents to [facility] [place] for     Chief Complaint   Patient presents with   • Hypertension      Patient seen today for follow up.  Has chronic medical problems including hypertension, hyperlipidemia and GERD.  Has been doing well since last office visit.  Feeling well.  Blood pressure has been controlled.  She is adherent to medication regimen.  She notes that she received notification that she was due for colon cancer screening with [employer].  She has been having some intermittent right sided flank pain.       Current Outpatient Medications:   •  acetaminophen (TYLENOL) 650 MG 8 hr tablet, Take 650 mg by mouth Every 8 (Eight) Hours As Needed for Mild Pain ., Disp: , Rfl:   •  albuterol sulfate  (90 Base) MCG/ACT inhaler, Inhale 2 puffs Every 6 (Six) Hours As Needed for Wheezing., Disp: 18 g, Rfl: 0  •  aspirin 81 MG chewable tablet, Chew 81 mg Daily., Disp: , Rfl:   •  atorvastatin (LIPITOR) 40 MG tablet, Take 1 tablet by mouth Every Night., Disp: 90 tablet, Rfl: 3  •  fexofenadine (ALLEGRA) 180 MG tablet, Take 180 mg by mouth Daily., Disp: , Rfl:   •  hydroCHLOROthiazide (HYDRODIURIL) 25 MG tablet, Take 1 tablet by mouth Daily., Disp: 90 tablet, Rfl: 3  •  MILK THISTLE PO, Take 1 tablet by mouth 2 (Two) Times a Day., Disp: , Rfl:   •  omeprazole (priLOSEC) 40 MG capsule, Take 1 capsule by mouth Daily., Disp: 90 capsule, Rfl: 3  •  valsartan (Diovan) 160 MG tablet, Take 1 tablet by mouth Daily., Disp: 90 tablet, Rfl: 3  •  VITAMIN E PO, Take 180 mg by mouth 2 (Two) Times a Day., Disp: , Rfl:      Objective       Vital Signs:   /80   Pulse 82   Ht 170.2 cm (67\")   Wt 82.1 kg (181 lb)   SpO2 97%   BMI 28.35 kg/m²     Physical " Exam  Vitals reviewed.   Constitutional:       General: She is not in acute distress.     Appearance: She is well-developed.   Cardiovascular:      Rate and Rhythm: Normal rate and regular rhythm.      Heart sounds: Normal heart sounds. No murmur heard.  Pulmonary:      Effort: Pulmonary effort is normal. No respiratory distress.      Breath sounds: Normal breath sounds. No wheezing or rales.   Abdominal:      Tenderness: There is no abdominal tenderness. There is no right CVA tenderness, left CVA tenderness, guarding or rebound.   Skin:     General: Skin is warm and dry.   Neurological:      Mental Status: She is alert and oriented to person, place, and time.        Result Review :{ Labs  Result Review  Imaging  Med Tab  Media :23}   The following data was reviewed by: Verito He MD on 02/10/2023    Common labs    Common Labs 8/11/22 8/11/22    1110 1110   Glucose 90    BUN 22    Creatinine 0.97    Sodium 140    Potassium 4.7    Chloride 103    Calcium 9.6    Albumin 4.60    Total Bilirubin 0.5    Alkaline Phosphatase 146 (A)    AST (SGOT) 15    ALT (SGPT) 16    Uric Acid  5.8 (A)   (A) Abnormal value                    Assessment and Plan {CC Problem List  Visit Diagnosis  ROS  Review (Popup)  Health Maintenance  Quality  BestPractice  Medications  SmartSets  SnapShot Encounters  Media :23}   Diagnoses and all orders for this visit:    1. Essential hypertension (Primary)  -     Lipid Panel; Future  -     CBC & Differential; Future  -     Comprehensive Metabolic Panel; Future  -     valsartan (Diovan) 160 MG tablet; Take 1 tablet by mouth Daily.  Dispense: 90 tablet; Refill: 3  -     TSH; Future    2. Mixed hyperlipidemia    3. Gastroesophageal reflux disease, unspecified whether esophagitis present  -     omeprazole (priLOSEC) 40 MG capsule; Take 1 capsule by mouth Daily.  Dispense: 90 capsule; Refill: 3    4. Right flank pain  -     Urinalysis With Culture If Indicated - Urine, Clean  Catch  -     Urinalysis, Microscopic Only - Urine, Clean Catch  -     Urine Culture - Urine, Urine, Clean Catch    5. Screening for malignant neoplasm of colon  -     Cologuard - Stool, Per Rectum; Future      Patient seen today for follow up  Hypertension controlled, continue current medication and home monitoring  Continue statin for hyperlipidemia  GERD symptoms controlled with Prilosec, continue  Check urinalysis for right flank pain  Due for colon cancer screening, elected Cologuard - ordered         Follow Up {Instructions Charge Capture  Follow-up Communications :23}   Return in about 6 months (around 8/14/2023) for Medicare Wellness.  Patient was given instructions and counseling regarding her condition or for health maintenance advice. Please see specific information pulled into the AVS if appropriate.            This document has been electronically signed by Verito He MD

## 2023-02-14 ENCOUNTER — LAB (OUTPATIENT)
Dept: LAB | Facility: HOSPITAL | Age: 68
End: 2023-02-14
Payer: MEDICARE

## 2023-02-14 DIAGNOSIS — I10 ESSENTIAL HYPERTENSION: ICD-10-CM

## 2023-02-14 LAB
ALBUMIN SERPL-MCNC: 4.7 G/DL (ref 3.5–5.2)
ALBUMIN/GLOB SERPL: 2.2 G/DL
ALP SERPL-CCNC: 143 U/L (ref 39–117)
ALT SERPL W P-5'-P-CCNC: 26 U/L (ref 1–33)
ANION GAP SERPL CALCULATED.3IONS-SCNC: 9.5 MMOL/L (ref 5–15)
AST SERPL-CCNC: 30 U/L (ref 1–32)
BACTERIA UR QL AUTO: ABNORMAL /HPF
BASOPHILS # BLD AUTO: 0.08 10*3/MM3 (ref 0–0.2)
BASOPHILS NFR BLD AUTO: 1.7 % (ref 0–1.5)
BILIRUB SERPL-MCNC: 0.3 MG/DL (ref 0–1.2)
BILIRUB UR QL STRIP: NEGATIVE
BUN SERPL-MCNC: 15 MG/DL (ref 8–23)
BUN/CREAT SERPL: 17.6 (ref 7–25)
CALCIUM SPEC-SCNC: 10.2 MG/DL (ref 8.6–10.5)
CHLORIDE SERPL-SCNC: 102 MMOL/L (ref 98–107)
CHOLEST SERPL-MCNC: 145 MG/DL (ref 0–200)
CLARITY UR: CLEAR
CO2 SERPL-SCNC: 30.5 MMOL/L (ref 22–29)
COLOR UR: YELLOW
CREAT SERPL-MCNC: 0.85 MG/DL (ref 0.57–1)
DEPRECATED RDW RBC AUTO: 40.2 FL (ref 37–54)
EGFRCR SERPLBLD CKD-EPI 2021: 74.7 ML/MIN/1.73
EOSINOPHIL # BLD AUTO: 0.25 10*3/MM3 (ref 0–0.4)
EOSINOPHIL NFR BLD AUTO: 5.3 % (ref 0.3–6.2)
ERYTHROCYTE [DISTWIDTH] IN BLOOD BY AUTOMATED COUNT: 12.3 % (ref 12.3–15.4)
GLOBULIN UR ELPH-MCNC: 2.1 GM/DL
GLUCOSE SERPL-MCNC: 91 MG/DL (ref 65–99)
GLUCOSE UR STRIP-MCNC: NEGATIVE MG/DL
HCT VFR BLD AUTO: 41.1 % (ref 34–46.6)
HDLC SERPL-MCNC: 64 MG/DL (ref 40–60)
HGB BLD-MCNC: 14.6 G/DL (ref 12–15.9)
HGB UR QL STRIP.AUTO: NEGATIVE
HYALINE CASTS UR QL AUTO: ABNORMAL /LPF
IMM GRANULOCYTES # BLD AUTO: 0.02 10*3/MM3 (ref 0–0.05)
IMM GRANULOCYTES NFR BLD AUTO: 0.4 % (ref 0–0.5)
KETONES UR QL STRIP: NEGATIVE
LDLC SERPL CALC-MCNC: 48 MG/DL (ref 0–100)
LDLC/HDLC SERPL: 0.61 {RATIO}
LEUKOCYTE ESTERASE UR QL STRIP.AUTO: ABNORMAL
LYMPHOCYTES # BLD AUTO: 1.58 10*3/MM3 (ref 0.7–3.1)
LYMPHOCYTES NFR BLD AUTO: 33.4 % (ref 19.6–45.3)
MCH RBC QN AUTO: 32.2 PG (ref 26.6–33)
MCHC RBC AUTO-ENTMCNC: 35.5 G/DL (ref 31.5–35.7)
MCV RBC AUTO: 90.7 FL (ref 79–97)
MONOCYTES # BLD AUTO: 0.45 10*3/MM3 (ref 0.1–0.9)
MONOCYTES NFR BLD AUTO: 9.5 % (ref 5–12)
NEUTROPHILS NFR BLD AUTO: 2.35 10*3/MM3 (ref 1.7–7)
NEUTROPHILS NFR BLD AUTO: 49.7 % (ref 42.7–76)
NITRITE UR QL STRIP: NEGATIVE
NRBC BLD AUTO-RTO: 0 /100 WBC (ref 0–0.2)
PH UR STRIP.AUTO: 6 [PH] (ref 5–8)
PLATELET # BLD AUTO: 276 10*3/MM3 (ref 140–450)
PMV BLD AUTO: 10.4 FL (ref 6–12)
POTASSIUM SERPL-SCNC: 3.7 MMOL/L (ref 3.5–5.2)
PROT SERPL-MCNC: 6.8 G/DL (ref 6–8.5)
PROT UR QL STRIP: NEGATIVE
RBC # BLD AUTO: 4.53 10*6/MM3 (ref 3.77–5.28)
RBC # UR STRIP: ABNORMAL /HPF
REF LAB TEST METHOD: ABNORMAL
SODIUM SERPL-SCNC: 142 MMOL/L (ref 136–145)
SP GR UR STRIP: 1.02 (ref 1–1.03)
SQUAMOUS #/AREA URNS HPF: ABNORMAL /HPF
TRIGL SERPL-MCNC: 209 MG/DL (ref 0–150)
TSH SERPL DL<=0.05 MIU/L-ACNC: 1.99 UIU/ML (ref 0.27–4.2)
UROBILINOGEN UR QL STRIP: ABNORMAL
VLDLC SERPL-MCNC: 33 MG/DL (ref 5–40)
WBC # UR STRIP: ABNORMAL /HPF
WBC NRBC COR # BLD: 4.73 10*3/MM3 (ref 3.4–10.8)

## 2023-02-14 PROCEDURE — 80053 COMPREHEN METABOLIC PANEL: CPT

## 2023-02-14 PROCEDURE — 85025 COMPLETE CBC W/AUTO DIFF WBC: CPT

## 2023-02-14 PROCEDURE — 84443 ASSAY THYROID STIM HORMONE: CPT

## 2023-02-14 PROCEDURE — 81001 URINALYSIS AUTO W/SCOPE: CPT | Performed by: FAMILY MEDICINE

## 2023-02-14 PROCEDURE — 87086 URINE CULTURE/COLONY COUNT: CPT | Performed by: FAMILY MEDICINE

## 2023-02-14 PROCEDURE — 36415 COLL VENOUS BLD VENIPUNCTURE: CPT

## 2023-02-14 PROCEDURE — 80061 LIPID PANEL: CPT

## 2023-02-15 ENCOUNTER — TELEPHONE (OUTPATIENT)
Dept: FAMILY MEDICINE CLINIC | Facility: CLINIC | Age: 68
End: 2023-02-15
Payer: MEDICARE

## 2023-02-15 NOTE — PROGRESS NOTES
Per , Ms. Malik has been called with recent lab results & recommendations.  Continue current medications and follow-up as planned or sooner if any problems.

## 2023-02-15 NOTE — TELEPHONE ENCOUNTER
Per , Ms. Malik has been called with recent lab results & recommendations.  Continue current medications and follow-up as planned or sooner if any problems.       ----- Message from Verito He MD sent at 2/14/2023  9:52 PM CST -----  Labs overall ok.  Awaiting urine culture, will call with these results once available.  Thanks, KEVAN He

## 2023-02-16 LAB — BACTERIA SPEC AEROBE CULT: NO GROWTH

## 2023-03-01 ENCOUNTER — TELEPHONE (OUTPATIENT)
Dept: FAMILY MEDICINE CLINIC | Facility: CLINIC | Age: 68
End: 2023-03-01
Payer: MEDICARE

## 2023-03-01 NOTE — TELEPHONE ENCOUNTER
Per , Ms. Malik has been called with recent Cologuard Colorectal Cancer Screen results & recommendations.  Continue current medications and follow-up as planned or sooner if any problems.       ----- Message from Verito He MD sent at 3/1/2023  8:05 AM CST -----  Cologuard negative.  - KEVAN He

## 2023-03-11 ENCOUNTER — HOSPITAL ENCOUNTER (EMERGENCY)
Facility: HOSPITAL | Age: 68
Discharge: HOME | End: 2023-03-11
Attending: FAMILY MEDICINE | Admitting: STUDENT IN AN ORGANIZED HEALTH CARE EDUCATION/TRAINING PROGRAM
Payer: MEDICARE

## 2023-03-11 ENCOUNTER — APPOINTMENT (OUTPATIENT)
Dept: GENERAL RADIOLOGY | Facility: HOSPITAL | Age: 68
End: 2023-03-11
Payer: MEDICARE

## 2023-03-11 VITALS
WEIGHT: 181 LBS | HEART RATE: 78 BPM | TEMPERATURE: 97.6 F | BODY MASS INDEX: 28.41 KG/M2 | RESPIRATION RATE: 20 BRPM | OXYGEN SATURATION: 96 % | SYSTOLIC BLOOD PRESSURE: 166 MMHG | DIASTOLIC BLOOD PRESSURE: 85 MMHG | HEIGHT: 67 IN

## 2023-03-11 DIAGNOSIS — S93.105A TOE DISLOCATION, LEFT, INITIAL ENCOUNTER: Primary | ICD-10-CM

## 2023-03-11 PROCEDURE — 73660 X-RAY EXAM OF TOE(S): CPT

## 2023-03-11 PROCEDURE — 99282 EMERGENCY DEPT VISIT SF MDM: CPT

## 2023-03-11 NOTE — DISCHARGE INSTRUCTIONS
Home to rest. May apply ice to affected toe on and off for next 24 hours. Tylenol and ibuprofen as needed for pain. May buddy tape toe to next toe for support if needed. Follow up as needed.

## 2023-03-11 NOTE — ED PROVIDER NOTES
Subjective   History of Present Illness  67 year old female patient presents to ER for complaint of pain and deformity to left 3rd toe. She reports that she stubbed her toe on the leg of her cough accidentally today. She was been walking on the heel of this foot to avoid pressure on the toe. Pain is 6/10 presently. She denies tobacco, ETOH, or illicit drug use.        Review of Systems   Constitutional: Negative.    HENT: Negative.    Eyes: Negative.    Respiratory: Negative.    Cardiovascular: Negative.    Gastrointestinal: Negative.    Endocrine: Negative.    Genitourinary: Negative.    Musculoskeletal: Positive for joint swelling.   Skin: Negative.    Allergic/Immunologic: Negative.    Neurological: Negative.    Hematological: Negative.    Psychiatric/Behavioral: Negative.        Past Medical History:   Diagnosis Date   • Breast cyst    • GERD (gastroesophageal reflux disease)    • Hyperlipidemia    • Hypertension    • Osteoarthritis    • Pulmonary emboli (HCC)    • Stroke (HCC)        Allergies   Allergen Reactions   • Adhesive Tape Other (See Comments)     Pulls skin off   • Wound Dressing Adhesive Rash       Past Surgical History:   Procedure Laterality Date   • BREAST CYST ASPIRATION     • TOTAL KNEE ARTHROPLASTY Right 2021       Family History   Problem Relation Age of Onset   • Hypertension Mother    • Hyperlipidemia Father    • Cancer Sister    • Breast cancer Sister    • Breast cancer Maternal Grandmother        Social History     Socioeconomic History   • Marital status:    Tobacco Use   • Smoking status: Former     Packs/day: 1.00     Years: 25.00     Pack years: 25.00     Types: Cigarettes     Quit date:      Years since quittin.2   • Smokeless tobacco: Never   Vaping Use   • Vaping Use: Never used   Substance and Sexual Activity   • Alcohol use: No   • Drug use: Never   • Sexual activity: Not Currently           Objective    /85 (BP Location: Left arm, Patient Position:  "Sitting)   Pulse 78   Temp 97.6 °F (36.4 °C) (Oral)   Resp 20   Ht 170.2 cm (67\")   Wt 82.1 kg (181 lb)   SpO2 96%   BMI 28.35 kg/m²     Physical Exam  Vitals and nursing note reviewed.   Constitutional:       General: She is not in acute distress.     Appearance: Normal appearance. She is not ill-appearing, toxic-appearing or diaphoretic.   HENT:      Head: Normocephalic.      Nose: Nose normal.      Mouth/Throat:      Mouth: Mucous membranes are moist.   Eyes:      Pupils: Pupils are equal, round, and reactive to light.   Cardiovascular:      Rate and Rhythm: Normal rate and regular rhythm.      Pulses: Normal pulses.      Heart sounds: Normal heart sounds.   Pulmonary:      Effort: Pulmonary effort is normal.      Breath sounds: Normal breath sounds.   Abdominal:      General: Bowel sounds are normal.      Palpations: Abdomen is soft.   Musculoskeletal:         General: Tenderness, deformity and signs of injury present. Normal range of motion.      Cervical back: Normal range of motion.      Comments: Tenderness, deformity, and ecchymosis to left 3rd toe. Cap refill and sensation intact.    Skin:     General: Skin is warm and dry.      Capillary Refill: Capillary refill takes less than 2 seconds.   Neurological:      Mental Status: She is alert and oriented to person, place, and time.   Psychiatric:         Mood and Affect: Mood normal.         Behavior: Behavior normal.         Thought Content: Thought content normal.         Judgment: Judgment normal.         Procedures           ED Course  ED Course as of 03/11/23 1924   Sat Mar 11, 2023   1753 Traction applied to left 3rd toe and dislocation reduced. Patient reports improvement of pain and can now move this toe.  [HS]      ED Course User Index  [HS] Susy Garcia APRN          XR Toe 2+ View Left    Result Date: 3/11/2023  EXAMINATION: XR TOES 2 OR MORE VIEWS COMPARISON: Left toe films from 0541 hours the same day. INDICATION: 3rd toe, post " reduction, S93.105A Unspecified dislocation of left toe(s), initial encounter FINDINGS:  Portable 3 views of the left forefoot centered at the 3rd toe demonstrate interval reduction of the left 3rd middle phalanx dorsal dislocation. Alignment appears anatomical. Tiny calcific or bony densities projecting over the plantar aspect of the PIP joint space may represent acute avulsion fracture fragments.     Satisfactory alignment status post reduction with questionable tiny avulsion fracture fragments at the plantar aspect of the PIP joint as described. Electronically signed by:  Marek Garza MD  3/11/2023 6:34 PM CST Workstation: [id number]    XR Toe 2+ View Left    Result Date: 3/11/2023  EXAMINATION: XR TOES 2 OR MORE VIEWS COMPARISON: None available INDICATION: 67-year-old female stubbed the 3rd toe. FINDINGS:  3 views of the left forefoot centered over the 3rd toe demonstrate complete dorsal dislocation of the 3rd middle phalanx with mild proximal displacement as well. No associated fracture is identified. No foreign bodies are seen.     Complete dorsal dislocation of the left 3rd middle phalanx as described. Electronically signed by:  Marek Garza MD  3/11/2023 6:15 PM CST Workstation: [id number]                                      [facility]    Final diagnoses:   Toe dislocation, left, initial encounter       ED Disposition  ED Disposition     ED Disposition   Discharge    Condition   Stable    Comment   --             Verito He MD  [address] [place] [address] [address]  [facility] [address] [place] [address]  [phone]      ER follow up, As needed         Medication List      No changes were made to your prescriptions during this visit.          Radha, Susy PALOMINO, APRN  03/11/23 192

## 2023-03-14 ENCOUNTER — TELEPHONE (OUTPATIENT)
Dept: FAMILY MEDICINE CLINIC | Facility: CLINIC | Age: 68
End: 2023-03-14
Payer: MEDICARE

## 2023-03-14 NOTE — TELEPHONE ENCOUNTER
Per Dr. He, Ms. Malik has been called with recent Screening Mammogram results & recommendations.  Continue current medications and follow-up as planned or sooner if any problems.     ----- Message from Verito He MD sent at 3/14/2023  8:55 AM CDT -----  Please call and let patient know that mammogram is normal.  Plan to repeat in 1 year.  Thanks, KEVAN He

## 2023-07-26 DIAGNOSIS — I10 ESSENTIAL HYPERTENSION: ICD-10-CM

## 2023-07-26 RX ORDER — HYDROCHLOROTHIAZIDE 25 MG/1
25 TABLET ORAL DAILY
Qty: 90 TABLET | Refills: 3 | Status: SHIPPED | OUTPATIENT
Start: 2023-07-26

## 2023-07-26 NOTE — TELEPHONE ENCOUNTER
Incoming Refill Request      Medication requested (name and dose): hydroCHLOROthiazide (HYDRODIURIL) 25 MG tablet     [facility] where [facility] be sent: [facility]    Additional details provided by patient: NONE    Best call back number: [phone]    Does the patient have less than a 3 day supply:  [x] Yes  [x] No    Ranjit Keenan Rep  07/26/23, 10:39 CDT

## 2023-08-14 ENCOUNTER — OFFICE VISIT (OUTPATIENT)
Dept: FAMILY MEDICINE CLINIC | Facility: CLINIC | Age: 68
End: 2023-08-14
Payer: MEDICARE

## 2023-08-14 VITALS
HEART RATE: 66 BPM | SYSTOLIC BLOOD PRESSURE: 138 MMHG | DIASTOLIC BLOOD PRESSURE: 86 MMHG | BODY MASS INDEX: 29.35 KG/M2 | WEIGHT: 187 LBS | OXYGEN SATURATION: 95 % | HEIGHT: 67 IN

## 2023-08-14 DIAGNOSIS — E78.2 MIXED HYPERLIPIDEMIA: ICD-10-CM

## 2023-08-14 DIAGNOSIS — I10 ESSENTIAL HYPERTENSION: ICD-10-CM

## 2023-08-14 DIAGNOSIS — Z00.00 MEDICARE ANNUAL WELLNESS VISIT, SUBSEQUENT: Primary | ICD-10-CM

## 2023-08-14 DIAGNOSIS — M20.21 HALLUX RIGIDUS OF RIGHT FOOT: ICD-10-CM

## 2023-08-14 RX ORDER — ALBUTEROL SULFATE 90 UG/1
2 AEROSOL, METERED RESPIRATORY (INHALATION) EVERY 6 HOURS PRN
Qty: 18 G | Refills: 2 | Status: SHIPPED | OUTPATIENT
Start: 2023-08-14

## 2023-08-14 NOTE — PROGRESS NOTES
The ABCs of the Annual Wellness Visit  Subsequent Medicare Wellness Visit    Subjective      Ann Marie Malik is a 67 y.o. female who presents for a Subsequent Medicare Wellness Visit.    The following portions of the patient's history were reviewed and   updated as appropriate: allergies, current medications, past family history, past medical history, past social history, past surgical history, and problem list.    Compared to one year ago, the patient feels her physical   health is the same.    Compared to one year ago, the patient feels her mental   health is the same.    Recent Hospitalizations:  She was not admitted to the hospital during the last year.       Current Medical Providers:  Patient Care Team:  Verito He MD as PCP - General (Family Medicine)  Christopher Whitney OD (Optometry)  Av Jean DPM as Consulting Physician (Podiatry)  Estuardo Morel DO as Consulting Physician (Gastroenterology)    Outpatient Medications Prior to Visit   Medication Sig Dispense Refill    acetaminophen (TYLENOL) 650 MG 8 hr tablet Take 1 tablet by mouth Every 8 (Eight) Hours As Needed for Mild Pain.      albuterol sulfate  (90 Base) MCG/ACT inhaler Inhale 2 puffs Every 6 (Six) Hours As Needed for Wheezing. 18 g 0    aspirin 81 MG chewable tablet Chew 1 tablet Daily.      atorvastatin (LIPITOR) 40 MG tablet Take 1 tablet by mouth Every Night. 90 tablet 3    fexofenadine (ALLEGRA) 180 MG tablet Take 1 tablet by mouth Daily.      hydroCHLOROthiazide (HYDRODIURIL) 25 MG tablet Take 1 tablet by mouth Daily. 90 tablet 3    MILK THISTLE PO Take 1 tablet by mouth 2 (Two) Times a Day.      omeprazole (priLOSEC) 40 MG capsule Take 1 capsule by mouth Daily. 90 capsule 3    valsartan (Diovan) 160 MG tablet Take 1 tablet by mouth Daily. 90 tablet 3    VITAMIN E PO Take 180 mg by mouth 2 (Two) Times a Day.       No facility-administered medications prior to visit.       No opioid medication identified on  "active medication list. I have reviewed chart for other potential  high risk medication/s and harmful drug interactions in the elderly.        Aspirin is on active medication list. Aspirin use is indicated based on review of current medical condition/s. Pros and cons of this therapy have been discussed today. Benefits of this medication outweigh potential harm.  Patient has been encouraged to continue taking this medication.  .      Patient Active Problem List   Diagnosis    Cerebrovascular accident (CVA)    Bilateral pulmonary embolism    Hypoxia    Encounter for anticoagulation discussion and counseling    Presence of total knee joint prosthesis, right    Hallux rigidus of right foot     Advance Care Planning   Advance Care Planning     Advance Directive is not on file.  ACP discussion was held with the patient during this visit. Patient does not have an advance directive, information provided.       Objective    Vitals:    23 1018   BP: 138/86   Pulse: 66   SpO2: 95%   Weight: 84.8 kg (187 lb)   Height: 170.2 cm (67\")   PainSc: 0-No pain     Estimated body mass index is 29.29 kg/mý as calculated from the following:    Height as of this encounter: 170.2 cm (67\").    Weight as of this encounter: 84.8 kg (187 lb).    BMI is >= 25 and <30. (Overweight) The following options were offered after discussion;: exercise counseling/recommendations and nutrition counseling/recommendations.  Stationary bike 2-3 times a week, goes about 15 -20 mins      Does the patient have evidence of cognitive impairment?   No            HEALTH RISK ASSESSMENT    Smoking Status:  Social History     Tobacco Use   Smoking Status Former    Packs/day: 1.00    Years: 25.00    Pack years: 25.00    Types: Cigarettes    Quit date: 2000    Years since quittin.6   Smokeless Tobacco Never     Alcohol Consumption:  Social History     Substance and Sexual Activity   Alcohol Use No     Fall Risk Screen:    CHELSIE Fall Risk Assessment was " completed, and patient is at LOW risk for falls.Assessment completed on:2023    Depression Screenin/14/2023    10:25 AM   PHQ-2/PHQ-9 Depression Screening   Little Interest or Pleasure in Doing Things 0-->not at all   Feeling Down, Depressed or Hopeless 0-->not at all   PHQ-9: Brief Depression Severity Measure Score 0       Health Habits and Functional and Cognitive Screenin/14/2023    10:00 AM   Functional & Cognitive Status   Do you have difficulty preparing food and eating? No   Do you have difficulty bathing yourself, getting dressed or grooming yourself? No   Do you have difficulty using the toilet? No   Do you have difficulty moving around from place to place? No   Do you have trouble with steps or getting out of a bed or a chair? No   Current Diet Well Balanced Diet   Dental Exam Up to date   Eye Exam Up to date   Exercise (times per week) 4 times per week   Current Exercises Include Stationary Bicycling/Spin Class   Do you need help using the phone?  No   Are you deaf or do you have serious difficulty hearing?  No   Do you need help to go to places out of walking distance? No   Do you need help shopping? No   Do you need help preparing meals?  No   Do you need help with housework?  No   Do you need help with laundry? No   Do you need help taking your medications? No   Do you need help managing money? No   Do you ever drive or ride in a car without wearing a seat belt? No   Have you felt unusual stress, anger or loneliness in the last month? No   Who do you live with? Spouse   If you need help, do you have trouble finding someone available to you? No   Have you been bothered in the last four weeks by sexual problems? No   Do you have difficulty concentrating, remembering or making decisions? No       Age-appropriate Screening Schedule:  Refer to the list below for future screening recommendations based on patient's age, sex and/or medical conditions. Orders for these recommended tests  are listed in the plan section. The patient has been provided with a written plan.    Health Maintenance   Topic Date Due    TDAP/TD VACCINES (1 - Tdap) Never done    COVID-19 Vaccine (3 - Moderna series) 05/21/2021    COLORECTAL CANCER SCREENING  02/22/2023    ANNUAL WELLNESS VISIT  08/11/2023    INFLUENZA VACCINE  10/01/2023    DXA SCAN  11/02/2023    LIPID PANEL  02/14/2024    MAMMOGRAM  03/13/2024    HEPATITIS C SCREENING  Completed    Pneumococcal Vaccine 65+  Completed    ZOSTER VACCINE  Completed                  CMS Preventative Services Quick Reference  Risk Factors Identified During Encounter:    None Identified    The above risks/problems have been discussed with the patient.  Pertinent information has been shared with the patient in the After Visit Summary.    Diagnoses and all orders for this visit:    1. Medicare annual wellness visit, subsequent (Primary)    2. Essential hypertension    3. Mixed hyperlipidemia    4. Hallux rigidus of right foot    Other orders  -     albuterol sulfate  (90 Base) MCG/ACT inhaler; Inhale 2 puffs Every 6 (Six) Hours As Needed for Wheezing.  Dispense: 18 g; Refill: 2      Patient seen today for Medicare Wellness exam  Counseled on recommended screenings and immunizations  Chronic medical problems controlled, continue current mediations  Plans for surgical intervention right foot, following with podiatry  Needed medication refills provided today    Follow Up:   Return in about 4 months (around 12/14/2023) for Recheck.   Next Medicare Wellness visit to be scheduled in 1 year.        An After Visit Summary and PPPS were made available to the patient.          This document has been electronically signed by Verito He MD

## 2023-08-22 ENCOUNTER — OFFICE VISIT (OUTPATIENT)
Dept: PODIATRY | Facility: CLINIC | Age: 68
End: 2023-08-22
Payer: MEDICARE

## 2023-08-22 VITALS — BODY MASS INDEX: 29.35 KG/M2 | WEIGHT: 187 LBS | HEIGHT: 67 IN

## 2023-08-22 DIAGNOSIS — M79.671 RIGHT FOOT PAIN: ICD-10-CM

## 2023-08-22 DIAGNOSIS — M20.21 HALLUX RIGIDUS OF RIGHT FOOT: Primary | ICD-10-CM

## 2023-08-22 PROCEDURE — 1159F MED LIST DOCD IN RCRD: CPT | Performed by: PODIATRIST

## 2023-08-22 PROCEDURE — 99213 OFFICE O/P EST LOW 20 MIN: CPT | Performed by: PODIATRIST

## 2023-08-22 PROCEDURE — 1160F RVW MEDS BY RX/DR IN RCRD: CPT | Performed by: PODIATRIST

## 2023-08-22 NOTE — PROGRESS NOTES
Ann Marie Malik  1955  67 y.o. female      2023      Chief Complaint   Patient presents with    Right Foot - Follow-up       History of Present Illness    Ann Marie Malik is a 67 y.o.female presents to clinic today for her preoperative appointment.  Patient is scheduled for .       Past Medical History:   Diagnosis Date    Breast cyst     GERD (gastroesophageal reflux disease)     Hyperlipidemia     Hypertension     Osteoarthritis     Pulmonary emboli     Stroke          Past Surgical History:   Procedure Laterality Date    BREAST CYST ASPIRATION      TOTAL KNEE ARTHROPLASTY Right 2021         Family History   Problem Relation Age of Onset    Hypertension Mother     Hyperlipidemia Father     Cancer Sister     Breast cancer Sister     Breast cancer Maternal Grandmother        Allergies   Allergen Reactions    Adhesive Tape Other (See Comments)     Pulls skin off    Wound Dressing Adhesive Rash       Social History     Socioeconomic History    Marital status:    Tobacco Use    Smoking status: Former     Packs/day: 1.00     Years: 25.00     Pack years: 25.00     Types: Cigarettes     Quit date:      Years since quittin.6    Smokeless tobacco: Never   Vaping Use    Vaping Use: Never used   Substance and Sexual Activity    Alcohol use: No    Drug use: Never    Sexual activity: Not Currently         Current Outpatient Medications   Medication Sig Dispense Refill    acetaminophen (TYLENOL) 650 MG 8 hr tablet Take 1 tablet by mouth Every 8 (Eight) Hours As Needed for Mild Pain.      albuterol sulfate  (90 Base) MCG/ACT inhaler Inhale 2 puffs Every 6 (Six) Hours As Needed for Wheezing. 18 g 2    aspirin 81 MG chewable tablet Chew 1 tablet Daily.      atorvastatin (LIPITOR) 40 MG tablet Take 1 tablet by mouth Every Night. 90 tablet 3    fexofenadine (ALLEGRA) 180 MG tablet Take 1 tablet by mouth Daily.      hydroCHLOROthiazide (HYDRODIURIL) 25 MG tablet Take 1  "tablet by mouth Daily. 90 tablet 3    MILK THISTLE PO Take 1 tablet by mouth 2 (Two) Times a Day.      omeprazole (priLOSEC) 40 MG capsule Take 1 capsule by mouth Daily. 90 capsule 3    valsartan (Diovan) 160 MG tablet Take 1 tablet by mouth Daily. 90 tablet 3    VITAMIN E PO Take 180 mg by mouth 2 (Two) Times a Day.      apixaban (ELIQUIS) 2.5 MG tablet tablet Take 1 tablet by mouth 2 (Two) Times a Day. 60 tablet 0     No current facility-administered medications for this visit.       Review of Systems   Constitutional: Negative.    HENT: Negative.     Respiratory: Negative.     Cardiovascular: Negative.    Gastrointestinal: Negative.    Endocrine: Negative.    Genitourinary: Negative.    Musculoskeletal:         Right great toe pain      Skin: Negative.    Allergic/Immunologic: Negative.    Neurological: Negative.    Hematological: Negative.    Psychiatric/Behavioral: Negative.         OBJECTIVE    Ht 170.2 cm (67\")   Wt 84.8 kg (187 lb)   BMI 29.29 kg/mý     Physical Exam  Vitals reviewed.   Constitutional:       General: She is not in acute distress.     Appearance: She is well-developed.   HENT:      Head: Normocephalic and atraumatic.      Nose: Nose normal.   Eyes:      Conjunctiva/sclera: Conjunctivae normal.      Pupils: Pupils are equal, round, and reactive to light.   Cardiovascular:      Pulses:           Dorsalis pedis pulses are 2+ on the right side.        Posterior tibial pulses are 2+ on the right side.   Pulmonary:      Effort: Pulmonary effort is normal. No respiratory distress.      Breath sounds: No wheezing.   Musculoskeletal:      Right foot: Decreased range of motion. Deformity and prominent metatarsal heads present.        Feet:    Feet:      Right foot:      Protective Sensation: 10 sites tested.  10 sites sensed.      Skin integrity: Skin integrity normal.   Skin:     General: Skin is warm and dry.      Capillary Refill: Capillary refill takes less than 2 seconds.   Neurological:      " Mental Status: She is alert and oriented to person, place, and time.   Psychiatric:         Behavior: Behavior normal.         Thought Content: Thought content normal.              Procedures        ASSESSMENT AND PLAN    Diagnoses and all orders for this visit:    1. Hallux rigidus of right foot (Primary)  -     Cancel: XR Foot 3+ View Right  -     apixaban (ELIQUIS) 2.5 MG tablet tablet; Take 1 tablet by mouth 2 (Two) Times a Day.  Dispense: 60 tablet; Refill: 0    2. Right foot pain        -Plan for right first metatarsal phalangeal arthrodesis with calcaneal bone graft on September 11.  Risks and benefits discussed in detail.  Perioperative instructions discussed again in detail.  All patient's questions were answered.  Recheck following surgery.          This document has been electronically signed by Av Jean DPM on August 22, 2023 12:57 CDT     8/22/2023  12:57 CDT

## 2023-08-22 NOTE — H&P (VIEW-ONLY)
Ann Marie Malik  1955  67 y.o. female      2023      Chief Complaint   Patient presents with    Right Foot - Follow-up       History of Present Illness    Ann Marie Malik is a 67 y.o.female presents to clinic today for her preoperative appointment.  Patient is scheduled for .       Past Medical History:   Diagnosis Date    Breast cyst     GERD (gastroesophageal reflux disease)     Hyperlipidemia     Hypertension     Osteoarthritis     Pulmonary emboli     Stroke          Past Surgical History:   Procedure Laterality Date    BREAST CYST ASPIRATION      TOTAL KNEE ARTHROPLASTY Right 2021         Family History   Problem Relation Age of Onset    Hypertension Mother     Hyperlipidemia Father     Cancer Sister     Breast cancer Sister     Breast cancer Maternal Grandmother        Allergies   Allergen Reactions    Adhesive Tape Other (See Comments)     Pulls skin off    Wound Dressing Adhesive Rash       Social History     Socioeconomic History    Marital status:    Tobacco Use    Smoking status: Former     Packs/day: 1.00     Years: 25.00     Pack years: 25.00     Types: Cigarettes     Quit date:      Years since quittin.6    Smokeless tobacco: Never   Vaping Use    Vaping Use: Never used   Substance and Sexual Activity    Alcohol use: No    Drug use: Never    Sexual activity: Not Currently         Current Outpatient Medications   Medication Sig Dispense Refill    acetaminophen (TYLENOL) 650 MG 8 hr tablet Take 1 tablet by mouth Every 8 (Eight) Hours As Needed for Mild Pain.      albuterol sulfate  (90 Base) MCG/ACT inhaler Inhale 2 puffs Every 6 (Six) Hours As Needed for Wheezing. 18 g 2    aspirin 81 MG chewable tablet Chew 1 tablet Daily.      atorvastatin (LIPITOR) 40 MG tablet Take 1 tablet by mouth Every Night. 90 tablet 3    fexofenadine (ALLEGRA) 180 MG tablet Take 1 tablet by mouth Daily.      hydroCHLOROthiazide (HYDRODIURIL) 25 MG tablet Take 1  "tablet by mouth Daily. 90 tablet 3    MILK THISTLE PO Take 1 tablet by mouth 2 (Two) Times a Day.      omeprazole (priLOSEC) 40 MG capsule Take 1 capsule by mouth Daily. 90 capsule 3    valsartan (Diovan) 160 MG tablet Take 1 tablet by mouth Daily. 90 tablet 3    VITAMIN E PO Take 180 mg by mouth 2 (Two) Times a Day.      apixaban (ELIQUIS) 2.5 MG tablet tablet Take 1 tablet by mouth 2 (Two) Times a Day. 60 tablet 0     No current facility-administered medications for this visit.       Review of Systems   Constitutional: Negative.    HENT: Negative.     Respiratory: Negative.     Cardiovascular: Negative.    Gastrointestinal: Negative.    Endocrine: Negative.    Genitourinary: Negative.    Musculoskeletal:         Right great toe pain      Skin: Negative.    Allergic/Immunologic: Negative.    Neurological: Negative.    Hematological: Negative.    Psychiatric/Behavioral: Negative.         OBJECTIVE    Ht 170.2 cm (67\")   Wt 84.8 kg (187 lb)   BMI 29.29 kg/m²     Physical Exam  Vitals reviewed.   Constitutional:       General: She is not in acute distress.     Appearance: She is well-developed.   HENT:      Head: Normocephalic and atraumatic.      Nose: Nose normal.   Eyes:      Conjunctiva/sclera: Conjunctivae normal.      Pupils: Pupils are equal, round, and reactive to light.   Cardiovascular:      Pulses:           Dorsalis pedis pulses are 2+ on the right side.        Posterior tibial pulses are 2+ on the right side.   Pulmonary:      Effort: Pulmonary effort is normal. No respiratory distress.      Breath sounds: No wheezing.   Musculoskeletal:      Right foot: Decreased range of motion. Deformity and prominent metatarsal heads present.        Feet:    Feet:      Right foot:      Protective Sensation: 10 sites tested.  10 sites sensed.      Skin integrity: Skin integrity normal.   Skin:     General: Skin is warm and dry.      Capillary Refill: Capillary refill takes less than 2 seconds.   Neurological:      " Mental Status: She is alert and oriented to person, place, and time.   Psychiatric:         Behavior: Behavior normal.         Thought Content: Thought content normal.              Procedures        ASSESSMENT AND PLAN    Diagnoses and all orders for this visit:    1. Hallux rigidus of right foot (Primary)  -     Cancel: XR Foot 3+ View Right  -     apixaban (ELIQUIS) 2.5 MG tablet tablet; Take 1 tablet by mouth 2 (Two) Times a Day.  Dispense: 60 tablet; Refill: 0    2. Right foot pain        -Plan for right first metatarsal phalangeal arthrodesis with calcaneal bone graft on September 11.  Risks and benefits discussed in detail.  Perioperative instructions discussed again in detail.  All patient's questions were answered.  Recheck following surgery.          This document has been electronically signed by Av Jean DPM on August 22, 2023 12:57 CDT     8/22/2023  12:57 CDT

## 2023-09-05 ENCOUNTER — PRE-ADMISSION TESTING (OUTPATIENT)
Dept: PREADMISSION TESTING | Facility: HOSPITAL | Age: 68
End: 2023-09-05
Payer: MEDICARE

## 2023-09-05 VITALS
OXYGEN SATURATION: 95 % | SYSTOLIC BLOOD PRESSURE: 118 MMHG | HEART RATE: 70 BPM | WEIGHT: 185 LBS | DIASTOLIC BLOOD PRESSURE: 76 MMHG | BODY MASS INDEX: 29.03 KG/M2 | RESPIRATION RATE: 18 BRPM | HEIGHT: 67 IN

## 2023-09-05 LAB
ANION GAP SERPL CALCULATED.3IONS-SCNC: 10 MMOL/L (ref 5–15)
BUN SERPL-MCNC: 18 MG/DL (ref 8–23)
BUN/CREAT SERPL: 22.2 (ref 7–25)
CALCIUM SPEC-SCNC: 9.6 MG/DL (ref 8.6–10.5)
CHLORIDE SERPL-SCNC: 101 MMOL/L (ref 98–107)
CO2 SERPL-SCNC: 28 MMOL/L (ref 22–29)
CREAT SERPL-MCNC: 0.81 MG/DL (ref 0.57–1)
EGFRCR SERPLBLD CKD-EPI 2021: 79.2 ML/MIN/1.73
GLUCOSE SERPL-MCNC: 96 MG/DL (ref 65–99)
POTASSIUM SERPL-SCNC: 3.7 MMOL/L (ref 3.5–5.2)
SODIUM SERPL-SCNC: 139 MMOL/L (ref 136–145)

## 2023-09-05 PROCEDURE — 80048 BASIC METABOLIC PNL TOTAL CA: CPT

## 2023-09-05 PROCEDURE — 93005 ELECTROCARDIOGRAM TRACING: CPT

## 2023-09-05 PROCEDURE — 36415 COLL VENOUS BLD VENIPUNCTURE: CPT

## 2023-09-05 RX ORDER — SODIUM CHLORIDE, SODIUM GLUCONATE, SODIUM ACETATE, POTASSIUM CHLORIDE AND MAGNESIUM CHLORIDE 526; 502; 368; 37; 30 MG/100ML; MG/100ML; MG/100ML; MG/100ML; MG/100ML
1000 INJECTION, SOLUTION INTRAVENOUS CONTINUOUS PRN
Status: CANCELLED | OUTPATIENT
Start: 2023-09-11

## 2023-09-05 NOTE — PAT
Chlorhexidine scrub given with instruction sheet. Instructions reviewed in PAT, understanding verbalized.    Patient states the prescription for Eliquis was sent in by Dr. Jean for her to start after procedure, not currently on it at this time.    Patient does have history of stroke and PE, and was not given any instructions from MD on her Aspirin. Instructed her that she does not have to stop it prior to procedure.

## 2023-09-09 LAB
QT INTERVAL: 392 MS
QTC INTERVAL: 420 MS

## 2023-09-10 ENCOUNTER — ANESTHESIA EVENT (OUTPATIENT)
Dept: PERIOP | Facility: HOSPITAL | Age: 68
End: 2023-09-10
Payer: MEDICARE

## 2023-09-11 ENCOUNTER — HOSPITAL ENCOUNTER (OUTPATIENT)
Facility: HOSPITAL | Age: 68
Setting detail: HOSPITAL OUTPATIENT SURGERY
Discharge: HOME | End: 2023-09-11
Attending: PODIATRIST | Admitting: PODIATRIST
Payer: MEDICARE

## 2023-09-11 ENCOUNTER — APPOINTMENT (OUTPATIENT)
Dept: GENERAL RADIOLOGY | Facility: HOSPITAL | Age: 68
End: 2023-09-11
Payer: MEDICARE

## 2023-09-11 ENCOUNTER — ANESTHESIA (OUTPATIENT)
Dept: PERIOP | Facility: HOSPITAL | Age: 68
End: 2023-09-11
Payer: MEDICARE

## 2023-09-11 VITALS
HEART RATE: 84 BPM | DIASTOLIC BLOOD PRESSURE: 70 MMHG | WEIGHT: 186.73 LBS | TEMPERATURE: 97.3 F | OXYGEN SATURATION: 95 % | HEIGHT: 67 IN | RESPIRATION RATE: 18 BRPM | SYSTOLIC BLOOD PRESSURE: 150 MMHG | BODY MASS INDEX: 29.31 KG/M2

## 2023-09-11 DIAGNOSIS — M20.21 HALLUX RIGIDUS OF RIGHT FOOT: ICD-10-CM

## 2023-09-11 PROCEDURE — 20900 REMOVAL OF BONE FOR GRAFT: CPT | Performed by: PODIATRIST

## 2023-09-11 PROCEDURE — 25010000002 ONDANSETRON PER 1 MG: Performed by: NURSE ANESTHETIST, CERTIFIED REGISTERED

## 2023-09-11 PROCEDURE — 76000 FLUOROSCOPY <1 HR PHYS/QHP: CPT

## 2023-09-11 PROCEDURE — 25010000002 ROPIVACAINE PER 1 MG: Performed by: PODIATRIST

## 2023-09-11 PROCEDURE — 25010000002 PROPOFOL 200 MG/20ML EMULSION: Performed by: NURSE ANESTHETIST, CERTIFIED REGISTERED

## 2023-09-11 PROCEDURE — 28750 FUSION OF BIG TOE JOINT: CPT | Performed by: PODIATRIST

## 2023-09-11 PROCEDURE — 25010000002 MIDAZOLAM PER 1 MG: Performed by: NURSE ANESTHETIST, CERTIFIED REGISTERED

## 2023-09-11 PROCEDURE — 0 LIDOCAINE 1 % SOLUTION: Performed by: PODIATRIST

## 2023-09-11 PROCEDURE — 25010000002 FENTANYL CITRATE PF 50 MCG/ML SOLUTION PREFILLED SYRINGE: Performed by: NURSE ANESTHETIST, CERTIFIED REGISTERED

## 2023-09-11 RX ORDER — ONDANSETRON 2 MG/ML
4 INJECTION INTRAMUSCULAR; INTRAVENOUS ONCE AS NEEDED
Status: DISCONTINUED | OUTPATIENT
Start: 2023-09-11 | End: 2023-09-11 | Stop reason: HOSPADM

## 2023-09-11 RX ORDER — LIDOCAINE HYDROCHLORIDE 10 MG/ML
INJECTION, SOLUTION INFILTRATION; PERINEURAL AS NEEDED
Status: DISCONTINUED | OUTPATIENT
Start: 2023-09-11 | End: 2023-09-11 | Stop reason: HOSPADM

## 2023-09-11 RX ORDER — PROPOFOL 10 MG/ML
INJECTION, EMULSION INTRAVENOUS AS NEEDED
Status: DISCONTINUED | OUTPATIENT
Start: 2023-09-11 | End: 2023-09-11 | Stop reason: SURG

## 2023-09-11 RX ORDER — ONDANSETRON 2 MG/ML
INJECTION INTRAMUSCULAR; INTRAVENOUS AS NEEDED
Status: DISCONTINUED | OUTPATIENT
Start: 2023-09-11 | End: 2023-09-11 | Stop reason: SURG

## 2023-09-11 RX ORDER — MIDAZOLAM HYDROCHLORIDE 1 MG/ML
INJECTION INTRAMUSCULAR; INTRAVENOUS AS NEEDED
Status: DISCONTINUED | OUTPATIENT
Start: 2023-09-11 | End: 2023-09-11 | Stop reason: SURG

## 2023-09-11 RX ORDER — SODIUM CHLORIDE, SODIUM GLUCONATE, SODIUM ACETATE, POTASSIUM CHLORIDE AND MAGNESIUM CHLORIDE 526; 502; 368; 37; 30 MG/100ML; MG/100ML; MG/100ML; MG/100ML; MG/100ML
1000 INJECTION, SOLUTION INTRAVENOUS CONTINUOUS PRN
Status: DISCONTINUED | OUTPATIENT
Start: 2023-09-11 | End: 2023-09-11 | Stop reason: HOSPADM

## 2023-09-11 RX ORDER — ROPIVACAINE HYDROCHLORIDE 5 MG/ML
INJECTION, SOLUTION EPIDURAL; INFILTRATION; PERINEURAL AS NEEDED
Status: DISCONTINUED | OUTPATIENT
Start: 2023-09-11 | End: 2023-09-11 | Stop reason: HOSPADM

## 2023-09-11 RX ORDER — HYDROCODONE BITARTRATE AND ACETAMINOPHEN 10; 325 MG/1; MG/1
1 TABLET ORAL EVERY 6 HOURS PRN
Qty: 30 TABLET | Refills: 0 | Status: SHIPPED | OUTPATIENT
Start: 2023-09-11

## 2023-09-11 RX ORDER — BUPIVACAINE HCL/0.9 % NACL/PF 0.1 %
2 PLASTIC BAG, INJECTION (ML) EPIDURAL ONCE
Status: COMPLETED | OUTPATIENT
Start: 2023-09-11 | End: 2023-09-11

## 2023-09-11 RX ORDER — LIDOCAINE HYDROCHLORIDE 20 MG/ML
INJECTION, SOLUTION EPIDURAL; INFILTRATION; INTRACAUDAL; PERINEURAL AS NEEDED
Status: DISCONTINUED | OUTPATIENT
Start: 2023-09-11 | End: 2023-09-11 | Stop reason: SURG

## 2023-09-11 RX ORDER — BACITRACIN ZINC 500 [USP'U]/G
OINTMENT TOPICAL AS NEEDED
Status: DISCONTINUED | OUTPATIENT
Start: 2023-09-11 | End: 2023-09-11 | Stop reason: HOSPADM

## 2023-09-11 RX ORDER — FENTANYL CITRATE 50 UG/ML
INJECTION, SOLUTION INTRAMUSCULAR; INTRAVENOUS AS NEEDED
Status: DISCONTINUED | OUTPATIENT
Start: 2023-09-11 | End: 2023-09-11 | Stop reason: SURG

## 2023-09-11 RX ADMIN — PROPOFOL 50 MG: 10 INJECTION, EMULSION INTRAVENOUS at 09:51

## 2023-09-11 RX ADMIN — FENTANYL CITRATE 25 MCG: 50 INJECTION, SOLUTION INTRAMUSCULAR; INTRAVENOUS at 10:23

## 2023-09-11 RX ADMIN — SODIUM CHLORIDE, SODIUM GLUCONATE, SODIUM ACETATE, POTASSIUM CHLORIDE AND MAGNESIUM CHLORIDE 1000 ML: 526; 502; 368; 37; 30 INJECTION, SOLUTION INTRAVENOUS at 07:26

## 2023-09-11 RX ADMIN — LIDOCAINE HYDROCHLORIDE 50 MG: 20 INJECTION, SOLUTION EPIDURAL; INFILTRATION; INTRACAUDAL; PERINEURAL at 09:30

## 2023-09-11 RX ADMIN — MIDAZOLAM HYDROCHLORIDE 2 MG: 1 INJECTION, SOLUTION INTRAMUSCULAR; INTRAVENOUS at 09:27

## 2023-09-11 RX ADMIN — FENTANYL CITRATE 25 MCG: 50 INJECTION, SOLUTION INTRAMUSCULAR; INTRAVENOUS at 09:53

## 2023-09-11 RX ADMIN — ONDANSETRON 4 MG: 2 INJECTION INTRAMUSCULAR; INTRAVENOUS at 10:07

## 2023-09-11 RX ADMIN — PROPOFOL 50 MG: 10 INJECTION, EMULSION INTRAVENOUS at 09:41

## 2023-09-11 RX ADMIN — Medication 2 G: at 09:33

## 2023-09-11 RX ADMIN — PROPOFOL 150 MG: 10 INJECTION, EMULSION INTRAVENOUS at 09:30

## 2023-09-11 NOTE — DISCHARGE INSTRUCTIONS
Discharge home  Resume normal diet  Keep dressing intact  Ice and elevate  No weight operative extremity. Ok to take boot off while sitting or sleeping.   Norco 10mg prn pain. Alternate with 600mg ibu  Follow up in clinic within 1 week          This document has been electronically signed by Av Jean DPM on September 11, 2023 10:51 CDT

## 2023-09-11 NOTE — OP NOTE
Date of Procedure: 9/11/23     SURGEON: Av Jean DPM      Assistant: Jessie Alaniz MA was responsible for performing the following activities: Retraction, Suction, Irrigation and Placing Dressing and their skilled assistance was necessary for the success of this case.      PREOPERATIVE DIAGNOSIS: Hallux rigidus right      POSTOPERATIVE DIAGNOSIS: Same as preop     PROCEDURES PERFORMED:   1.  Right first metatarsophalangeal joint arthrodesis  2.  Calcaneal bone graft     ANESTHESIA: General     HEMOSTASIS: Pneumatic thigh tourniquet set at 220 mmHg.      ESTIMATED BLOOD LOSS: 10 mL.      SPECIMEN: none     MATERIALS: Arthrex MaxForce plate, 3.5 FT screw     INJECTABLES: 10 cc quarter percent Marcaine plain postoperatively     COMPLICATIONS: None.      CONDITION: Stable.       INDICATIONS FOR PROCEDURE: 59-year-old female presents for scheduled surgical intervention right foot.  The patient agrees to undergo the surgical intervention at this time. Consent is signed and documented in the chart. History and physical exam were reviewed prior to patient being taken to the operating room and n.p.o. status was confirmed. No guarantees were given or implied regarding the outcome of this treatment.      DESCRIPTION OF PROCEDURE: After mild sedation was administered by the anesthesia team in the preoperative holding area the patient was transported to the operating room and placed in a supine position.  General anesthesia was then administered and the right foot was prepped and draped in usual sterile technique and a timeout was performed to confirm the correct patient, correct site, and correct procedure.      Attention was then directed to the right lower extremity which was exsanguinated using an Esmarch bandage and the tourniquet was rappidly inflated to 220 mmHg.  Next a dorsal medial incision was made over the first metatarsal phalangeal joint.  Sharp dissection carried down to the MTP capsule.  Capsule was  incised.  Joint was prepped with Alli cup and cone reamers.  Incision site was flushed. Next a small incision was made to the posterior lateral aspect of the heel.  Sharp dissection down to bone.  Arthrex 8 mm reamer used to harvest a small calcaneal bone graft.  [place] [facility] site was flushed and closed with Nylon.  Bone graft was inserted into arthrodesis site after the joint was fenestrated with a 2.0 drill bit.  Hallux was reduced and temporarily fixated with a k-wire.  Intraoperative fluoroscopy confirms adequate reduction.  Arthrodesis site was fixated with a dorsal locking plate with locking and nonlocking screws and a 3.5 FT interfragmentary screw.  Intraoperative fluoroscopy used throughout the entire process to ensure that joint was properly reduced and that hardware is well-placed.  Operative site was flushed and closed in layers with monocryl and nylon.  Sterile dressing applied.  Tourniquet deflated with a rapid hyperemic response noted to the distal digit.  Patient tolerated procedure and anesthesia well and was transported from the operating room to the PACU with vital signs stable neurovascular status intact to the operative extremity.     Plan to discharge patient home.  patient can resume his normal diet. Keep dressing clean, dry and intact.  Follow-up in 1 week.          This document has been electronically signed by Av Jean DPM on September 11, 2023 14:50 CDT

## 2023-09-11 NOTE — BRIEF OP NOTE
TOE INTERPHALANGEAL JOINT/METATARSOPHALANGEAL JOINT FUSION  Progress Note    Ann Marie Malik  9/11/2023    Pre-op Diagnosis:   Hallux rigidus of right foot [M20.21]       Post-Op Diagnosis Codes:     * Hallux rigidus of right foot [M20.21]    Procedure/CPT® Codes:        Procedure(s):  Right first metatarsal phalangeal joint arthrodesis with calcaneal bone graft.              Surgeon(s):  Av Jean DPM    Anesthesia: General    Staff:   Circulator: Isela Martinez RN  Scrub Person: Joy Yeboah  Vendor Representative: Shelly Watkins  Assistant: Jessie Alaniz MA  Assistant: Jessie Alaniz MA      Estimated Blood Loss: minimal    Urine Voided: * No values recorded between 9/11/2023  9:23 AM and 9/11/2023 10:35 AM *    Specimens:                None          Drains: * No LDAs found *    Findings: consistent with pre-op        Complications: none    Assistant: Jessie Alaniz MA  was responsible for performing the following activities: Retraction, Suction, Irrigation, and Placing Dressing and their skilled assistance was necessary for the success of this case.    Av Jean DPM     Date: 9/11/2023  Time: 10:49 CDT

## 2023-09-11 NOTE — ANESTHESIA PROCEDURE NOTES
Airway  Urgency: elective    Date/Time: 9/11/2023 9:32 AM    General Information and Staff    Patient location during procedure: PACU  CRNA/CAA: Merari Lee CRNA  SRNA: Molly Montgomery SRNA  Indications and Patient Condition  Indications for airway management: airway protection    Preoxygenated: yes  MILS maintained throughout  Mask difficulty assessment: 0 - not attempted    Final Airway Details  Final airway type: supraglottic airway      Successful airway: LMA  Size 4     Number of attempts at approach: 2  Assessment: lips, teeth, and gum same as pre-op    Additional Comments  Size 3 placed initially. Replaced with size 4 for better seal.

## 2023-09-11 NOTE — ANESTHESIA PREPROCEDURE EVALUATION
Anesthesia Evaluation     Patient summary reviewed and Nursing notes reviewed   history of anesthetic complications:  PONV  NPO Solid Status: > 8 hours  NPO Liquid Status: > 2 hours           Airway   Mallampati: II  TM distance: >3 FB  Neck ROM: full  possible difficult intubation  Dental    (+) poor dentation    Pulmonary     breath sounds clear to auscultation  (+) pulmonary embolism, a smoker (quit 15-20 years ago) Former,  (-) COPD, asthma, shortness of breath, sleep apnea, rhonchi, decreased breath sounds, wheezes, rales  Cardiovascular   Exercise tolerance: good (4-7 METS)    ECG reviewed  Rhythm: regular  Rate: normal    (+) hypertension less than 2 medications, valvular problems/murmurs TI, hyperlipidemia  (-) past MI, dysrhythmias, angina, murmur, cardiac stents    ROS comment: [id number]  Normal sinus rhythm  Normal ECG  When compared with ECG of 28-AUG-2021 06:30,  QT has shortened      TTE: 21  Interpretation Summary  · Estimated left ventricular EF = 63% Left ventricular ejection fraction appears to be 61 - 65%. Left ventricular systolic function is normal.  · Left ventricular diastolic function is consistent with (grade I) impaired relaxation.  · Estimated right ventricular systolic pressure from tricuspid regurgitation is normal (<35 mmHg).      Neuro/Psych  (+) CVA  (-) seizures  GI/Hepatic/Renal/Endo    (+) obesity, GERD well controlled, liver disease ([place]) history of elevated LFT  (-) no renal disease, diabetes, no thyroid disorder    Musculoskeletal     Abdominal   (+) obese   Substance History - negative use  (-) alcohol use, drug use     OB/GYN      Comment: Previous tubal        Other   arthritis,     ROS/Med Hx Other: Was prescribed the albuterol inhaler for wheezing. Last dose was this morning.     Hx bilateral PE: 2 years ago.     CVA:  denies any residual symptoms     Hx:GERD: controlled on omeprazole    Has a prescription for Warfarin to start after surgery has not been  taking before.                   Anesthesia Plan    ASA 3     general     intravenous induction

## 2023-09-11 NOTE — INTERVAL H&P NOTE
H&P reviewed. The patient was examined and there are no changes to the H&P.            This document has been electronically signed by Av Jean DPM on September 11, 2023 08:58 CDT          ambulatory

## 2023-09-11 NOTE — ANESTHESIA POSTPROCEDURE EVALUATION
Patient: Ann Marie Malik    Procedure Summary       Date: 09/11/23 Room / Location: [facility] OR 11 / [facility] OR    Anesthesia Start: 0923 Anesthesia Stop: 1036    Procedure: TOE INTERPHALANGEAL JOINT/METATARSOPHALANGEAL JOINT FUSION (Right: Foot) Diagnosis:       Hallux rigidus of right foot      (Hallux rigidus of right foot [M20.21])    Surgeons: Av Jean DPM Provider: Merari Lee CRNA    Anesthesia Type: general ASA Status: 3            Anesthesia Type: general    Vitals  No vitals data found for the desired time range.          Post Anesthesia Care and Evaluation    Patient location during evaluation: PACU  Patient participation: complete - patient participated  Level of consciousness: awake  Pain management: adequate    Airway patency: patent  Anesthetic complications: No anesthetic complications  PONV Status: none  Cardiovascular status: acceptable  Respiratory status: acceptable  Hydration status: acceptable    Comments: BP: 107/53  HR: 77  SpO2: 94  RR: 13  Temp: 97.2  No anesthesia care post op

## 2023-09-12 NOTE — INTERVAL H&P NOTE
Vitals:    09/11/23 1038 09/11/23 1053 09/11/23 1108 09/11/23 1150   BP: 107/53 113/53 100/51 150/70   BP Location: Right arm Right arm     Patient Position:   Lying Lying   Pulse: 80 70 71 84   Resp: 16 18 18 18   Temp: 97.2 °F (36.2 °C) 97.3 °F (36.3 °C) 97 °F (36.1 °C) 97.3 °F (36.3 °C)   TempSrc: Temporal Temporal Temporal Temporal   SpO2: 93% 97% 95% 95%   Weight:       Height:                 This document has been electronically signed by Av Jean DPM on September 12, 2023 10:57 CDT

## 2023-09-19 ENCOUNTER — OFFICE VISIT (OUTPATIENT)
Dept: PODIATRY | Facility: CLINIC | Age: 68
End: 2023-09-19
Payer: MEDICARE

## 2023-09-19 VITALS
SYSTOLIC BLOOD PRESSURE: 134 MMHG | OXYGEN SATURATION: 94 % | HEIGHT: 67 IN | HEART RATE: 80 BPM | DIASTOLIC BLOOD PRESSURE: 91 MMHG | WEIGHT: 186 LBS | BODY MASS INDEX: 29.19 KG/M2

## 2023-09-19 DIAGNOSIS — M20.21 HALLUX RIGIDUS OF RIGHT FOOT: Primary | ICD-10-CM

## 2023-09-19 PROCEDURE — 99024 POSTOP FOLLOW-UP VISIT: CPT | Performed by: PODIATRIST

## 2023-09-19 PROCEDURE — 1159F MED LIST DOCD IN RCRD: CPT | Performed by: PODIATRIST

## 2023-09-19 PROCEDURE — 1160F RVW MEDS BY RX/DR IN RCRD: CPT | Performed by: PODIATRIST

## 2023-09-19 NOTE — PROGRESS NOTES
Ann Marie Malik  1955  67 y.o. female  PCP: Verito He MD 2023  Non-Diabetic    2023       Chief Complaint   Patient presents with    Right Foot - Post-op, Follow-up       History of Present Illness    Ann Marie Malik is a 67 y.o.female. Patient presents to clinic for a post-operative visit. Patient had a TOE INTERPHALANGEAL JOINT/METATARSOPHALANGEAL JOINT FUSION. Date of Surgery: 2023.      Past Medical History:   Diagnosis Date    Breast cyst     Elevated cholesterol     Fatty liver     GERD (gastroesophageal reflux disease)     Hyperlipidemia     Hypertension     Osteoarthritis     PONV (postoperative nausea and vomiting)     Pulmonary emboli     Stroke          Past Surgical History:   Procedure Laterality Date    BREAST CYST ASPIRATION Left     LAPAROSCOPIC TUBAL LIGATION      TOTAL KNEE ARTHROPLASTY Right 2021         Family History   Problem Relation Age of Onset    Hypertension Mother     Hyperlipidemia Father     Cancer Sister     Breast cancer Sister     Breast cancer Maternal Grandmother        Allergies   Allergen Reactions    Adhesive Tape Other (See Comments)     Pulls skin off    Wound Dressing Adhesive Rash       Social History     Socioeconomic History    Marital status:    Tobacco Use    Smoking status: Former     Packs/day: 1.00     Years: 25.00     Pack years: 25.00     Types: Cigarettes     Quit date:      Years since quittin.7    Smokeless tobacco: Never   Vaping Use    Vaping Use: Never used   Substance and Sexual Activity    Alcohol use: No    Drug use: Never    Sexual activity: Defer         Current Outpatient Medications   Medication Sig Dispense Refill    acetaminophen (TYLENOL) 650 MG 8 hr tablet Take 1 tablet by mouth Every 8 (Eight) Hours As Needed for Mild Pain.      albuterol sulfate  (90 Base) MCG/ACT inhaler Inhale 2 puffs Every 6 (Six) Hours As Needed for Wheezing. 18 g 2    apixaban (ELIQUIS) 2.5 MG tablet  "tablet Take 1 tablet by mouth 2 (Two) Times a Day. 60 tablet 0    aspirin 81 MG chewable tablet Chew 1 tablet Daily.      atorvastatin (LIPITOR) 40 MG tablet Take 1 tablet by mouth Every Night. 90 tablet 3    fexofenadine (ALLEGRA) 180 MG tablet Take 1 tablet by mouth Daily As Needed.      hydroCHLOROthiazide (HYDRODIURIL) 25 MG tablet Take 1 tablet by mouth Daily. 90 tablet 3    HYDROcodone-acetaminophen (Norco)  MG per tablet Take 1 tablet by mouth Every 6 (Six) Hours As Needed for Moderate Pain. 30 tablet 0    MILK THISTLE PO Take 1 tablet by mouth 2 (Two) Times a Day.      omeprazole (priLOSEC) 40 MG capsule Take 1 capsule by mouth Daily. 90 capsule 3    valsartan (Diovan) 160 MG tablet Take 1 tablet by mouth Daily. 90 tablet 3    VITAMIN E PO Take 180 mg by mouth 2 (Two) Times a Day.       No current facility-administered medications for this visit.       Review of Systems      OBJECTIVE    /91   Pulse 80   Ht 170.2 cm (67.01\")   Wt 84.4 kg (186 lb)   SpO2 94%   BMI 29.12 kg/m²     Physical Exam  Vitals reviewed.   Constitutional:       General: She is not in acute distress.     Appearance: She is well-developed.   HENT:      Head: Normocephalic and atraumatic.      Nose: Nose normal.   Eyes:      Conjunctiva/sclera: Conjunctivae normal.      Pupils: Pupils are equal, round, and reactive to light.   Cardiovascular:      Pulses:           Dorsalis pedis pulses are 2+ on the right side.        Posterior tibial pulses are 2+ on the right side.   Pulmonary:      Effort: Pulmonary effort is normal. No respiratory distress.      Breath sounds: No wheezing.   Feet:      Right foot:      Protective Sensation: 10 sites tested.  10 sites sensed.   Skin:     General: Skin is warm and dry.      Capillary Refill: Capillary refill takes less than 2 seconds.   Neurological:      Mental Status: She is alert and oriented to person, place, and time.   Psychiatric:         Behavior: Behavior normal.         " Thought Content: Thought content normal.              Procedures        ASSESSMENT AND PLAN    Diagnoses and all orders for this visit:    1. Hallux rigidus of right foot (Primary)        -Patient doing well post-operatively  - Applied compressive dressing  - Continue no weightbearing  - Recheck in 1 week.          This document has been electronically signed by Av Jean DPM on September 19, 2023 12:47 CDT     9/19/2023  12:47 CDT

## 2023-09-25 ENCOUNTER — OFFICE VISIT (OUTPATIENT)
Dept: PODIATRY | Facility: CLINIC | Age: 68
End: 2023-09-25

## 2023-09-25 VITALS
DIASTOLIC BLOOD PRESSURE: 84 MMHG | OXYGEN SATURATION: 92 % | HEART RATE: 105 BPM | WEIGHT: 186 LBS | SYSTOLIC BLOOD PRESSURE: 115 MMHG | HEIGHT: 67 IN | BODY MASS INDEX: 29.19 KG/M2

## 2023-09-25 DIAGNOSIS — M20.21 HALLUX RIGIDUS OF RIGHT FOOT: Primary | ICD-10-CM

## 2023-09-25 DIAGNOSIS — M79.671 RIGHT FOOT PAIN: ICD-10-CM

## 2023-09-25 PROCEDURE — 99024 POSTOP FOLLOW-UP VISIT: CPT | Performed by: NURSE PRACTITIONER

## 2023-09-25 PROCEDURE — 29580 STRAPPING UNNA BOOT: CPT | Performed by: NURSE PRACTITIONER

## 2023-09-25 NOTE — PROGRESS NOTES
Ann Marie Malik  1955  67 y.o. female      2023      Chief Complaint   Patient presents with    Right Foot - Follow-up, Post-op Follow-up       History of Present Illness    Ann Marie Malik is a 67 y.o.female Patient presents to clinic for a post-operative visit. Patient had a TOE INTERPHALANGEAL JOINT/METATARSOPHALANGEAL JOINT FUSION. Date of Surgery: 2023. Xrays obtained today.  Past Medical History:   Diagnosis Date    Breast cyst     Elevated cholesterol     Fatty liver     GERD (gastroesophageal reflux disease)     Hyperlipidemia     Hypertension     Osteoarthritis     PONV (postoperative nausea and vomiting)     Pulmonary emboli     Stroke          Past Surgical History:   Procedure Laterality Date    BREAST CYST ASPIRATION Left     LAPAROSCOPIC TUBAL LIGATION      TOTAL KNEE ARTHROPLASTY Right 2021         Family History   Problem Relation Age of Onset    Hypertension Mother     Hyperlipidemia Father     Cancer Sister     Breast cancer Sister     Breast cancer Maternal Grandmother        Allergies   Allergen Reactions    Adhesive Tape Other (See Comments)     Pulls skin off    Wound Dressing Adhesive Rash       Social History     Socioeconomic History    Marital status:    Tobacco Use    Smoking status: Former     Packs/day: 1.00     Years: 25.00     Pack years: 25.00     Types: Cigarettes     Quit date:      Years since quittin.    Smokeless tobacco: Never   Vaping Use    Vaping Use: Never used   Substance and Sexual Activity    Alcohol use: No    Drug use: Never    Sexual activity: Defer         Current Outpatient Medications   Medication Sig Dispense Refill    acetaminophen (TYLENOL) 650 MG 8 hr tablet Take 1 tablet by mouth Every 8 (Eight) Hours As Needed for Mild Pain.      albuterol sulfate  (90 Base) MCG/ACT inhaler Inhale 2 puffs Every 6 (Six) Hours As Needed for Wheezing. 18 g 2    apixaban (ELIQUIS) 2.5 MG tablet tablet Take 1 tablet by  "mouth 2 (Two) Times a Day. 60 tablet 0    aspirin 81 MG chewable tablet Chew 1 tablet Daily.      atorvastatin (LIPITOR) 40 MG tablet Take 1 tablet by mouth Every Night. 90 tablet 3    fexofenadine (ALLEGRA) 180 MG tablet Take 1 tablet by mouth Daily As Needed.      hydroCHLOROthiazide (HYDRODIURIL) 25 MG tablet Take 1 tablet by mouth Daily. 90 tablet 3    HYDROcodone-acetaminophen (Norco)  MG per tablet Take 1 tablet by mouth Every 6 (Six) Hours As Needed for Moderate Pain. 30 tablet 0    MILK THISTLE PO Take 1 tablet by mouth 2 (Two) Times a Day.      omeprazole (priLOSEC) 40 MG capsule Take 1 capsule by mouth Daily. 90 capsule 3    valsartan (Diovan) 160 MG tablet Take 1 tablet by mouth Daily. 90 tablet 3    VITAMIN E PO Take 180 mg by mouth 2 (Two) Times a Day.       No current facility-administered medications for this visit.       Review of Systems   Constitutional: Negative.    HENT: Negative.     Eyes: Negative.    Respiratory: Negative.     Cardiovascular: Negative.    Gastrointestinal: Negative.    Endocrine: Negative.    Genitourinary: Negative.    Musculoskeletal:         Foot pain    Skin: Negative.    Allergic/Immunologic: Negative.    Neurological: Negative.    Hematological: Negative.    Psychiatric/Behavioral: Negative.         OBJECTIVE    /84   Pulse 105   Ht 170.2 cm (67.01\")   Wt 84.4 kg (186 lb)   SpO2 92%   BMI 29.12 kg/m²     Physical Exam  Vitals reviewed.   Constitutional:       Appearance: Normal appearance. She is well-developed.   HENT:      Head: Normocephalic and atraumatic.   Neck:      Trachea: Trachea and phonation normal.   Cardiovascular:      Pulses:           Dorsalis pedis pulses are 2+ on the right side and 2+ on the left side.        Posterior tibial pulses are 2+ on the right side and 2+ on the left side.   Pulmonary:      Effort: Pulmonary effort is normal. No respiratory distress.   Abdominal:      General: There is no distension.      Palpations: Abdomen " is soft.   Musculoskeletal:        Feet:    Feet:      Right foot:      Skin integrity: Skin integrity normal.      Toenail Condition: Right toenails are normal.      Left foot:      Skin integrity: Skin integrity normal.      Toenail Condition: Left toenails are normal.      Comments: Sutures removed, edges of the incision are approximated with no evidence of infection noted.  Skin:     General: Skin is warm and dry.   Neurological:      Mental Status: She is alert and oriented to person, place, and time.      GCS: GCS eye subscore is 4. GCS verbal subscore is 5. GCS motor subscore is 6.   Psychiatric:         Speech: Speech normal.         Behavior: Behavior normal. Behavior is cooperative.         Thought Content: Thought content normal.         Judgment: Judgment normal.              Procedures  Unna boot applied to the right lower extremity.  Patient was neurovascularly intact post procedure.    ASSESSMENT AND PLAN    Diagnoses and all orders for this visit:    1. Hallux rigidus of right foot (Primary)    2. Right foot pain      Follow-up in 1 week for recheck.  May remove the Unna boot for painful swelling however needs to use Ace wrap's in a cam walker boot while weightbearing.  Contact office for worsening symptoms        This document has been electronically signed by Boris SMITH, FNP-C, ONP-C on September 25, 2023 09:55 CDT

## (undated) DEVICE — DISPOSABLE TOURNIQUET CUFF SINGLE BLADDER, DUAL PORT AND QUICK CONNECT CONNECTOR: Brand: COLOR CUFF

## (undated) DEVICE — CATHETER,URETHRAL,REDRUBBER,STRL,16FR: Brand: MEDLINE

## (undated) DEVICE — PROXIMATE SKIN STAPLERS (35 WIDE) CONTAINS 35 STAINLESS STEEL STAPLES (FIXED HEAD): Brand: PROXIMATE

## (undated) DEVICE — DRSNG GZ CURAD XEROFORM NONADHS 5X9IN STRL

## (undated) DEVICE — STERILE POLYISOPRENE POWDER-FREE SURGICAL GLOVES WITH EMOLLIENT COATING: Brand: PROTEXIS

## (undated) DEVICE — KT DRN EVAC WND PVC PCH WTROC RND 10F400

## (undated) DEVICE — GOWN,SIRUS,FABRIC-REINFORCED,X-LARGE: Brand: MEDLINE

## (undated) DEVICE — ANTIBACTERIAL UNDYED BRAIDED (POLYGLACTIN 910), SYNTHETIC ABSORBABLE SUTURE: Brand: COATED VICRYL

## (undated) DEVICE — RECIPROCATING BLADE, DOUBLE SIDED, OFFSET  (70.0 X 1.0 X 12.5MM)

## (undated) DEVICE — SPNG DRN AMD EXCILON 6PLY 4X4IN PK/2

## (undated) DEVICE — SOL IRR NACL 0.9PCT BT 1000ML

## (undated) DEVICE — SOL IRR NACL 0.9PCT 3000ML

## (undated) DEVICE — NO-SCRATCH ™ SMALL WHITNEY CURETTE ™ IS A SINGLE-USE, PLASTIC CURETTE FOR QUICKLY APPLYING, MANIPULATING AND REMOVING BONE CEMENT DURING HIP AND KNEE REPLACEMENT SURGERY. THE PLASTIC IS SOFTER THAN STEEL INSTRUMENTS, REDUCING THE RISK OF DAMAGING THE PROSTHESIS WITH METAL INSTRUMENTS.  THE CURETTE’S 6MM TIP REMOVES EXCESS CEMENT FROM REPLACEMENT HIPS AND KNEES. EASY-TO-MANEUVER, THE SMALL BLUE CURETTE LETS YOU REMOVE CEMENT FROM ALL EDGES OF THE PROSTHESIS.NO-SCRATCH WHITNEY SMALL CURETTE FEATURES:SAFER THAN STEEL- MADE OF PLASTIC - STURDY YET SOFTER THAN SURGICAL STEEL.HANDIER- EACH TOOL HAS A MOLDED-IN THUMB INDENTATION INSTANTLY ORIENTING THE TOOL.- EASIER TO MANEUVER IN HARD TO SEE PLACES.- COLOR-CODED FOR EASY IDENTIFICATION.FASTER- COMES INDIVIDUALLY PACKAGED IN STERILE, PEEL OPEN POUCH, READY TO GO.- APPLIES, MANIPULATES, OR REMOVES CEMENT WITH FINGERTIP PRECISION.ECONOMICAL- THE COST OF A SINGLE REVISION DWARFS THE COST OF A SINGLE-USE CURETTE. - DISPOSABLE – THERE’S NO NEED TO WASTE TIME REMOVING HARDENED CEMENT OR RE-STERILIZING TOOLS.- LESS EXPENSIVE TO BUY AND INVENTORY - ORDER ONLY THE TOOL YOU USE.- PACKAGED 25 INDIVIDUALLY WRAPPED TOOLS TO A CARTON FOR CONVENIENT SHELF STORAGE.: Brand: WHITNEY NO-SCRATCH CURETTE (SMALL)

## (undated) DEVICE — BNDG ELAS ELITE V/CLOSE 6IN 5YD LF STRL

## (undated) DEVICE — SUT ETHIB 0 CT1 CR8 18IN CX21D

## (undated) DEVICE — TRAY,FOLEY INSERTION,PVP,10ML SYRINGE: Brand: MEDLINE

## (undated) DEVICE — UNDRPD BREATH 23X36 BG/10

## (undated) DEVICE — GLV SURG SENSICARE POLYISPRN W/ALOE PF LF 6.5 GRN STRL

## (undated) DEVICE — PK KN TOTL LF 60

## (undated) DEVICE — HOOD, PEEL-AWAY: Brand: FLYTE

## (undated) DEVICE — SUT ETHIB 0/0 CT1 30IN X424H

## (undated) DEVICE — GLV SURG SIGNATURE ESSENTIAL PF LTX SZ7.5

## (undated) DEVICE — SOL IRR H2O BG 1000ML STRL

## (undated) DEVICE — PENCL ES MEGADINE EZ/CLEAN BUTN W/HOLSTR 10FT

## (undated) DEVICE — SPNG GZ WOVN 4X4IN 12PLY 10/BX STRL

## (undated) DEVICE — SUT VIC 0 CT1 36IN J946H

## (undated) DEVICE — PATIENT RETURN ELECTRODE, SINGLE-USE, CONTACT QUALITY MONITORING, ADULT, WITH 9FT CORD, FOR PATIENTS WEIGING OVER 33LBS. (15KG): Brand: MEGADYNE

## (undated) DEVICE — GLV SURG SIGNATURE ESSENTIAL PF LTX SZ8

## (undated) DEVICE — STRYKER PERFORMANCE SERIES SAGITTAL BLADE: Brand: STRYKER PERFORMANCE SERIES